# Patient Record
Sex: FEMALE | Race: WHITE | NOT HISPANIC OR LATINO | Employment: FULL TIME | URBAN - METROPOLITAN AREA
[De-identification: names, ages, dates, MRNs, and addresses within clinical notes are randomized per-mention and may not be internally consistent; named-entity substitution may affect disease eponyms.]

---

## 2017-02-15 ENCOUNTER — OFFICE VISIT (OUTPATIENT)
Dept: OBSTETRICS AND GYNECOLOGY | Facility: CLINIC | Age: 27
End: 2017-02-15
Attending: OBSTETRICS & GYNECOLOGY
Payer: COMMERCIAL

## 2017-02-15 VITALS — BODY MASS INDEX: 36.93 KG/M2 | HEIGHT: 66 IN | WEIGHT: 229.81 LBS

## 2017-02-15 DIAGNOSIS — Z01.419 ROUTINE GYNECOLOGICAL EXAMINATION: Primary | ICD-10-CM

## 2017-02-15 PROCEDURE — 88175 CYTOPATH C/V AUTO FLUID REDO: CPT

## 2017-02-15 PROCEDURE — 99999 PR PBB SHADOW E&M-EST. PATIENT-LVL III: CPT | Mod: PBBFAC,,, | Performed by: OBSTETRICS & GYNECOLOGY

## 2017-02-15 PROCEDURE — 99395 PREV VISIT EST AGE 18-39: CPT | Mod: S$GLB,,, | Performed by: OBSTETRICS & GYNECOLOGY

## 2017-02-15 RX ORDER — PANTOPRAZOLE SODIUM 40 MG/1
40 TABLET, DELAYED RELEASE ORAL DAILY
Refills: 11 | COMMUNITY
Start: 2017-01-23 | End: 2018-03-14

## 2017-02-15 NOTE — PROGRESS NOTES
Subjective:       Patient ID: Rosangela Salazar is a 26 y.o. female.    Chief Complaint:  Annual Exam (pap: 01/15/2016- WNL)      There is no problem list on file for this patient.      History of Present Illness  26 y.o. yo No obstetric history on file. here for annual exam. No gyn complaints. Doing well. Happy with OCP. Now working at [employer] Having some gallbladder issues but surgeon said no surgery yet      History reviewed. No pertinent past medical history.    Past Surgical History   Procedure Laterality Date    [place] tooth extraction      Colonoscopy  01/2013       OB History   No data available       Patient's last menstrual period was 01/29/2017.   Date of Last Pap: No result found    Review of Systems  Review of Systems   Constitutional: Negative for fatigue and unexpected weight change.   Respiratory: Negative for shortness of breath.    Cardiovascular: Negative for chest pain.   Gastrointestinal: Negative for abdominal pain, constipation, diarrhea, nausea and vomiting.   Genitourinary: Negative for dysuria.   Musculoskeletal: Negative for back pain.   Skin: Negative for rash.   Neurological: Negative for headaches.   Hematological: Does not bruise/bleed easily.   Psychiatric/Behavioral: Negative for behavioral problems.        Objective:   Physical Exam:   Constitutional: She is oriented to person, place, and time. Vital signs are normal. She appears well-developed and well-nourished. No distress.        Pulmonary/Chest: She exhibits no mass. Right breast exhibits no mass, no nipple discharge, no skin change, no tenderness, no bleeding and no swelling. Left breast exhibits no mass, no nipple discharge, no skin change, no tenderness, no bleeding and no swelling. Breasts are symmetrical.        Abdominal: Soft. Normal appearance and bowel sounds are normal. She exhibits no distension and no mass. There is no tenderness. There is no rebound.     Genitourinary: Vagina normal and uterus normal. There  is no rash, tenderness, lesion or injury on the right labia. There is no rash, tenderness, lesion or injury on the left labia. Uterus is not deviated, not enlarged, not fixed, not tender, not hosting fibroids and not experiencing uterine prolapse. Cervix is normal. Right adnexum displays no mass, no tenderness and no fullness. Left adnexum displays no mass, no tenderness and no fullness. No erythema, tenderness, rectocele, cystocele or unspecified prolapse of vaginal walls in the vagina. No vaginal discharge found. Cervix exhibits no motion tenderness, no discharge and no friability.           Musculoskeletal: Normal range of motion and moves all extremeties.      Lymphadenopathy:     She has no axillary adenopathy.        Right: No supraclavicular adenopathy present.        Left: No supraclavicular adenopathy present.    Neurological: She is alert and oriented to person, place, and time.    Skin: Skin is warm and dry.    Psychiatric: She has a normal mood and affect. Her behavior is normal. Judgment normal.        Assessment/ Plan:     1. Routine gynecological examination  Liquid-based pap smear, screening       Follow-up with me in 1 year

## 2017-02-15 NOTE — MR AVS SNAPSHOT
"    [facility]  [address]  [address]  [facility] [address]  Phone: [phone]  Fax: [phone]                  Rosangela Salazar   2/15/2017 8:30 AM   Office Visit    Description:  Female : 1990   Provider:  Mitali Pereyra MD   Department:  [facility]           Reason for Visit     Annual Exam           Diagnoses this Visit        Comments    Routine gynecological examination    -  Primary            To Do List           Goals (5 Years of Data)     None      Follow-Up and Disposition     Return in about 1 year (around 2/15/2018) for Annual exam.      [facility]     [facility] -  Assistance  Registered nurses in the [facility] provide clinical advisement, health education, appointment booking, and other advisory services.  Call for this free service at [phone].             Medications           Message regarding Medications     Verify the changes and/or additions to your medication regime listed below are the same as discussed with your clinician today.  If any of these changes or additions are incorrect, please notify your healthcare provider.        STOP taking these medications     norethindrone-ethinyl estradiol (JUNEL FE 1/20) 1 mg-20 mcg (21)/75 mg (7) per tablet Take 1 tablet by mouth once daily.           Verify that the below list of medications is an accurate representation of the medications you are currently taking.  If none reported, the list may be blank. If incorrect, please contact your healthcare provider. Carry this list with you in case of emergency.           Current Medications     CETIRIZINE HCL (ZYRTEC ORAL)     norgestrel-ethinyl estradiol (CRYSELLE, Christin,) 0.3-30 mg-mcg per tablet Take 1 tablet by mouth once daily.    pantoprazole (PROTONIX) 40 MG tablet Take 40 mg by mouth once daily.           Clinical Reference Information           Your Vitals Were     Height Weight Last Period BMI       5' 6" (1.676 " m) 104.2 kg (229 lb 13.3 oz) 01/29/2017 37.1 kg/m2       Allergies as of 2/15/2017     Zithromax [Azithromycin]      Immunizations Administered on Date of Encounter - 2/15/2017     None      Orders Placed During Today's Visit      Normal Orders This Visit    Liquid-based pap smear, screening       Language Assistance Services     ATTENTION: Language assistance services are available, free of charge. Please call [phone].      ATENCIÓN: Si habla español, tiene a marcum disposición servicios gratuitos de asistencia lingüística. Llame al [phone].     CHÚ Ý: N?u b?n nói Ti?ng Vi?t, có các d?ch v? h? tr? ngôn ng? mi?n phí dành cho b?n. G?i s? [phone].         [facility] complies with applicable Federal civil rights laws and does not discriminate on the basis of race, color, national origin, age, disability, or sex.

## 2017-07-07 DIAGNOSIS — Z01.419 ROUTINE GYNECOLOGICAL EXAMINATION: ICD-10-CM

## 2017-09-10 ENCOUNTER — OFFICE VISIT (OUTPATIENT)
Dept: URGENT CARE | Facility: CLINIC | Age: 27
End: 2017-09-10
Payer: COMMERCIAL

## 2017-09-10 VITALS
HEIGHT: 66 IN | SYSTOLIC BLOOD PRESSURE: 129 MMHG | DIASTOLIC BLOOD PRESSURE: 94 MMHG | TEMPERATURE: 97 F | BODY MASS INDEX: 36.1 KG/M2 | WEIGHT: 224.63 LBS | HEART RATE: 115 BPM | OXYGEN SATURATION: 98 %

## 2017-09-10 DIAGNOSIS — H66.90 OTITIS MEDIA, UNSPECIFIED CHRONICITY, UNSPECIFIED LATERALITY, UNSPECIFIED OTITIS MEDIA TYPE: Primary | ICD-10-CM

## 2017-09-10 PROCEDURE — 99203 OFFICE O/P NEW LOW 30 MIN: CPT | Mod: S$GLB,,, | Performed by: FAMILY MEDICINE

## 2017-09-10 PROCEDURE — 3008F BODY MASS INDEX DOCD: CPT | Mod: S$GLB,,, | Performed by: FAMILY MEDICINE

## 2017-09-10 RX ORDER — AMOXICILLIN AND CLAVULANATE POTASSIUM 875; 125 MG/1; MG/1
1 TABLET, FILM COATED ORAL 2 TIMES DAILY
Qty: 20 TABLET | Refills: 0 | Status: SHIPPED | OUTPATIENT
Start: 2017-09-10 | End: 2017-09-20

## 2017-09-10 RX ORDER — METHYLPREDNISOLONE 4 MG/1
TABLET ORAL
Refills: 0 | COMMUNITY
Start: 2017-08-04 | End: 2017-12-24

## 2017-09-10 RX ORDER — TRAMADOL HYDROCHLORIDE 50 MG/1
TABLET ORAL
Refills: 0 | COMMUNITY
Start: 2017-08-04 | End: 2019-04-11

## 2017-09-10 NOTE — PROGRESS NOTES
"Subjective:       Patient ID: Rosangela Salazar is a 27 y.o. female.    Vitals:  height is 5' 6" (1.676 m) and weight is 101.9 kg (224 lb 9.6 oz). Her oral temperature is 97.1 °F (36.2 °C). Her blood pressure is 129/94 (abnormal) and her pulse is 115 (abnormal). Her oxygen saturation is 98%.     Chief Complaint: No chief complaint on file.    Pt took Tussin cold and cough last night at 12:45.      Cough   This is a new problem. The current episode started in the past 7 days. The problem has been gradually worsening. The cough is productive of purulent sputum. Associated symptoms include ear congestion, ear pain (left ear), myalgias, nasal congestion, postnasal drip, rhinorrhea and a sore throat (hurts to swollow, eat, and drink). Pertinent negatives include no chest pain, chills, eye redness, fever, headaches, shortness of breath or wheezing. The symptoms are aggravated by pollens. The treatment provided mild relief.     Review of Systems   Constitution: Positive for malaise/fatigue and night sweats. Negative for chills and fever.   HENT: Positive for congestion, ear pain (left ear), hoarse voice, postnasal drip, rhinorrhea and sore throat (hurts to swollow, eat, and drink).    Eyes: Negative for discharge and redness.   Cardiovascular: Negative for chest pain, dyspnea on exertion and leg swelling.   Respiratory: Positive for cough and sputum production (thick white). Negative for shortness of breath and wheezing.    Musculoskeletal: Positive for myalgias.   Gastrointestinal: Positive for diarrhea (yesterday). Negative for abdominal pain and nausea.   Neurological: Negative for headaches.       Objective:      Physical Exam   Constitutional: She appears well-developed. No distress.   HENT:   Right Ear: External ear normal.   Left Ear: There is swelling and tenderness.   Nose: Nose normal.   Mouth/Throat: Oropharynx is clear and moist.   Eyes: Conjunctivae are normal.   Cardiovascular: Regular rhythm and normal heart " sounds.    Pulmonary/Chest: Effort normal and breath sounds normal.   Skin: No rash noted.   Vitals reviewed.      Assessment:       1. Otitis media, unspecified chronicity, unspecified laterality, unspecified otitis media type        Plan:         Otitis media, unspecified chronicity, unspecified laterality, unspecified otitis media type    Other orders  -     amoxicillin-clavulanate 875-125mg (AUGMENTIN) 875-125 mg per tablet; Take 1 tablet by mouth 2 (two) times daily.  Dispense: 20 tablet; Refill: 0

## 2017-09-20 ENCOUNTER — TELEPHONE (OUTPATIENT)
Dept: OBSTETRICS AND GYNECOLOGY | Facility: CLINIC | Age: 27
End: 2017-09-20

## 2017-09-20 NOTE — PROGRESS NOTES
"Chief complaint: Vaginal Irritation/Itching    HPI: 27 y.o.  female reporting Vaginal Irritation/itching for the last several days. She started with rectal itching and then became more vaginal. No changes in discharge. Is recovering form URI and OM and had Augmentin, otherwise no changes in sexual partners, soaps, detergents, douching. She has not used anything OTC and denies pelvic pain, fever, chills, n/v/lesions or cuts.      ROS   Systemic: Not feeling tired (fatigue).  No fever chills   Gastrointestinal: No nausea, vomiting, no abdominal pain.  No diarrhea.  Genitourinary: No dysuria. No Pelvic Pain  Skin: No rash.    /80   Ht 5' 6" (1.676 m)   Wt 103.7 kg (228 lb 9.9 oz)   LMP 2017 (Exact Date)   BMI 36.90 kg/m²     Physical Exam   Vital Signs:° Normal.  General Appearance:° Well developed. ° Well nourished.  Lymph Nodes: no Inguinal LAD  Urinary System:° Normal. Urethra: ° Meatus showed no abnormalities. ° No mass on the urethra.  Genitalia: External: ° Vulva was mildly erythematous. ° Genitalia exhibited no lesion.                    Vagina: ° Mucosa was normal. ° A vaginal discharge was observed small amt of thick white discharge, no odor.   Pelvic: Cervix: ° No cervical discharge. ° Showed no lesion. ° Not tender, no CMT         Uterus: ° Position was normal. ° Not tender.  Neurological:° No disorientation was observed.  Psychiatric:Affect: ° Normal.  Skin:° No skin lesions.   Perineum:° Normal. ° Did not have a mass.° No perineal lesions/rashes/erythema     Assessment/Plan:  1. Yeast infection-affirm ordered but will treat empirically with Diflucan 150mg po today and repeat in 2 days, external Monistat cream as needed for symptom relief. Prevention measures discussed/educated. Most likely related to recent antibiotic use.    RTC prn and as scheduled for annual exam.    "

## 2017-09-20 NOTE — TELEPHONE ENCOUNTER
Pt thinks she has a yeast infection.  She c/o itching. She has recently been on an antibiotic.  She will call back for an appt for tomorrow with NP

## 2017-09-20 NOTE — TELEPHONE ENCOUNTER
Dr Pereyra pt call thinks she's had's a yeast infection but never had one before.Pt is very itchy and would like to be seen.Pt #  [phone]

## 2017-09-21 ENCOUNTER — OFFICE VISIT (OUTPATIENT)
Dept: OBSTETRICS AND GYNECOLOGY | Facility: CLINIC | Age: 27
End: 2017-09-21
Payer: COMMERCIAL

## 2017-09-21 VITALS
HEIGHT: 66 IN | BODY MASS INDEX: 36.74 KG/M2 | SYSTOLIC BLOOD PRESSURE: 118 MMHG | WEIGHT: 228.63 LBS | DIASTOLIC BLOOD PRESSURE: 80 MMHG

## 2017-09-21 DIAGNOSIS — N89.8 VAGINAL ITCHING: ICD-10-CM

## 2017-09-21 DIAGNOSIS — B37.9 ANTIBIOTIC-INDUCED YEAST INFECTION: Primary | ICD-10-CM

## 2017-09-21 DIAGNOSIS — T36.95XA ANTIBIOTIC-INDUCED YEAST INFECTION: Primary | ICD-10-CM

## 2017-09-21 LAB
CANDIDA RRNA VAG QL PROBE: NEGATIVE
G VAGINALIS RRNA GENITAL QL PROBE: NEGATIVE
T VAGINALIS RRNA GENITAL QL PROBE: NEGATIVE

## 2017-09-21 PROCEDURE — 99999 PR PBB SHADOW E&M-EST. PATIENT-LVL III: CPT | Mod: PBBFAC,,, | Performed by: NURSE PRACTITIONER

## 2017-09-21 PROCEDURE — 87660 TRICHOMONAS VAGIN DIR PROBE: CPT

## 2017-09-21 PROCEDURE — 3008F BODY MASS INDEX DOCD: CPT | Mod: S$GLB,,, | Performed by: NURSE PRACTITIONER

## 2017-09-21 PROCEDURE — 99214 OFFICE O/P EST MOD 30 MIN: CPT | Mod: S$GLB,,, | Performed by: NURSE PRACTITIONER

## 2017-09-21 PROCEDURE — 87480 CANDIDA DNA DIR PROBE: CPT

## 2017-09-21 RX ORDER — FLUCONAZOLE 150 MG/1
TABLET ORAL
Qty: 2 TABLET | Refills: 0 | Status: SHIPPED | OUTPATIENT
Start: 2017-09-21 | End: 2019-09-12

## 2017-09-22 ENCOUNTER — TELEPHONE (OUTPATIENT)
Dept: OBSTETRICS AND GYNECOLOGY | Facility: CLINIC | Age: 27
End: 2017-09-22

## 2017-09-22 NOTE — TELEPHONE ENCOUNTER
Vaginal swab was negative. However since the patient had external itching and yeast appearance would recommend continue the diflucan and external monistat cream. There is also a small chance the swab could have been neg.   Would recommend continue medication  Left message to return call. Please let pt know but if she needs to talk with me you let me know  Thank you

## 2017-09-26 ENCOUNTER — TELEPHONE (OUTPATIENT)
Dept: OBSTETRICS AND GYNECOLOGY | Facility: CLINIC | Age: 27
End: 2017-09-26

## 2017-09-26 NOTE — TELEPHONE ENCOUNTER
Let pt know vaginal swab was neg but since pt had yeast like symptoms to continue the meds. Pt verbalized understanding.

## 2017-09-26 NOTE — TELEPHONE ENCOUNTER
Dr. Pereyra-- pt returning Anna's call. Pt states that she does not have the [phone] phone right now and to please call [phone].

## 2017-12-24 ENCOUNTER — OFFICE VISIT (OUTPATIENT)
Dept: URGENT CARE | Facility: CLINIC | Age: 27
End: 2017-12-24
Payer: COMMERCIAL

## 2017-12-24 VITALS
DIASTOLIC BLOOD PRESSURE: 106 MMHG | RESPIRATION RATE: 16 BRPM | OXYGEN SATURATION: 97 % | HEART RATE: 93 BPM | TEMPERATURE: 97 F | SYSTOLIC BLOOD PRESSURE: 149 MMHG

## 2017-12-24 DIAGNOSIS — J32.4 PANSINUSITIS, UNSPECIFIED CHRONICITY: Primary | ICD-10-CM

## 2017-12-24 DIAGNOSIS — R05.9 COUGH: ICD-10-CM

## 2017-12-24 LAB
CTP QC/QA: YES
FLUAV AG NPH QL: NEGATIVE
FLUBV AG NPH QL: NEGATIVE

## 2017-12-24 PROCEDURE — 99213 OFFICE O/P EST LOW 20 MIN: CPT | Mod: 25,S$GLB,, | Performed by: INTERNAL MEDICINE

## 2017-12-24 PROCEDURE — 96372 THER/PROPH/DIAG INJ SC/IM: CPT | Mod: S$GLB,,, | Performed by: INTERNAL MEDICINE

## 2017-12-24 PROCEDURE — 87804 INFLUENZA ASSAY W/OPTIC: CPT | Mod: 59,QW,S$GLB, | Performed by: INTERNAL MEDICINE

## 2017-12-24 RX ORDER — DEXAMETHASONE SODIUM PHOSPHATE 100 MG/10ML
15 INJECTION INTRAMUSCULAR; INTRAVENOUS
Status: COMPLETED | OUTPATIENT
Start: 2017-12-24 | End: 2017-12-24

## 2017-12-24 RX ORDER — METHYLPREDNISOLONE 4 MG/1
TABLET ORAL
Qty: 1 PACKAGE | Refills: 0 | Status: SHIPPED | OUTPATIENT
Start: 2017-12-24 | End: 2018-03-14

## 2017-12-24 RX ORDER — AMOXICILLIN AND CLAVULANATE POTASSIUM 875; 125 MG/1; MG/1
1 TABLET, FILM COATED ORAL 2 TIMES DAILY
Qty: 20 TABLET | Refills: 0 | Status: SHIPPED | OUTPATIENT
Start: 2017-12-24 | End: 2018-01-03

## 2017-12-24 RX ADMIN — DEXAMETHASONE SODIUM PHOSPHATE 15 MG: 100 INJECTION INTRAMUSCULAR; INTRAVENOUS at 11:12

## 2017-12-24 NOTE — PATIENT INSTRUCTIONS
Sinusitis (Antibiotic Treatment)    The sinuses are air-filled spaces within the bones of the face. They connect to the inside of the nose. Sinusitis is an inflammation of the tissue lining the sinus cavity. Sinus inflammation can occur during a cold. It can also be due to allergies to pollens and other particles in the air. Sinusitis can cause symptoms of sinus congestion and fullness. A sinus infection causes fever, headache and facial pain. There is often green or yellow drainage from the nose or into the back of the throat (post-nasal drip). You have been given antibiotics to treat this condition.  Home care:  · Take the full course of antibiotics as instructed. Do not stop taking them, even if you feel better.  · Drink plenty of water, hot tea, and other liquids. This may help thin mucus. It also may promote sinus drainage.  · Heat may help soothe painful areas of the face. Use a towel soaked in hot water. Or,  the shower and direct the hot spray onto your face. Using a vaporizer along with a menthol rub at night may also help.   · An expectorant containing guaifenesin may help thin the mucus and promote drainage from the sinuses.  · Over-the-counter decongestants may be used unless a similar medicine was prescribed. Nasal sprays work the fastest. Use one that contains phenylephrine or oxymetazoline. First blow the nose gently. Then use the spray. Do not use these medicines more often than directed on the label or symptoms may get worse. You may also use tablets containing pseudoephedrine. Avoid products that combine ingredients, because side effects may be increased. Read labels. You can also ask the pharmacist for help. (NOTE: Persons with high blood pressure should not use decongestants. They can raise blood pressure.)  · Over-the-counter antihistamines may help if allergies contributed to your sinusitis.    · Do not use nasal rinses or irrigation during an acute sinus infection, unless told to by  your health care provider. Rinsing may spread the infection to other sinuses.  · Use acetaminophen or ibuprofen to control pain, unless another pain medicine was prescribed. (If you have chronic liver or kidney disease or ever had a stomach ulcer, talk with your doctor before using these medicines. Aspirin should never be used in anyone under 18 years of age who is ill with a fever. It may cause severe liver damage.)  · Don't smoke. This can worsen symptoms.  Follow-up care  Follow up with your healthcare provider or our staff if you are not improving within the next week.  When to seek medical advice  Call your healthcare provider if any of these occur:  · Facial pain or headache becoming more severe  · Stiff neck  · Unusual drowsiness or confusion  · Swelling of the forehead or eyelids  · Vision problems, including blurred or double vision  · Fever of 100.4ºF (38ºC) or higher, or as directed by your healthcare provider  · Seizure  · Breathing problems  · Symptoms not resolving within 10 days    Start the Medrol dose pack tomorrow

## 2017-12-24 NOTE — PROGRESS NOTES
Subjective:       Patient ID: Rsoangela Salazar is a 27 y.o. female.    Vitals:  oral temperature is 97 °F (36.1 °C). Her blood pressure is 149/106 (abnormal) and her pulse is 93. Her respiration is 16 and oxygen saturation is 97%.     Chief Complaint: Nasal Congestion    PT C/O NASAL CONGESTION, 2 DAYS, SORE THROAT, FATIGUE, DRY COUGH, HEADACHES, EAR CONGESTION, TAKING DAYQUIL AND NYQUIL WITH MILD RELIEF,       Review of Systems   Constitution: Positive for malaise/fatigue. Negative for chills and fever.   HENT: Positive for congestion, ear pain and sore throat. Negative for hoarse voice.    Eyes: Negative for discharge and redness.   Cardiovascular: Negative for chest pain and dyspnea on exertion.   Respiratory: Positive for cough. Negative for shortness of breath, sputum production and wheezing.    Musculoskeletal: Negative for myalgias.   Gastrointestinal: Negative for abdominal pain, diarrhea and nausea.   Neurological: Negative for headaches.       Objective:      Physical Exam   Constitutional: She is oriented to person, place, and time. She appears well-developed and well-nourished. She is cooperative.  Non-toxic appearance. She does not appear ill. No distress.   HENT:   Head: Normocephalic and atraumatic.   Right Ear: Hearing, external ear and ear canal normal. A middle ear effusion is present.   Left Ear: Hearing, external ear and ear canal normal. A middle ear effusion is present.   Nose: No mucosal edema, rhinorrhea or nasal deformity. Right sinus exhibits maxillary sinus tenderness and frontal sinus tenderness. Left sinus exhibits maxillary sinus tenderness and frontal sinus tenderness.   Mouth/Throat: Uvula is midline and mucous membranes are normal. No trismus in the jaw. Normal dentition. No uvula swelling. Posterior oropharyngeal erythema present.   Eyes: Conjunctivae and lids are normal.   Sclera clear bilat   Neck: Trachea normal, full passive range of motion without pain and phonation normal. Neck  supple.   Cardiovascular: Normal rate, regular rhythm, normal heart sounds and normal pulses.    Pulmonary/Chest: Effort normal and breath sounds normal. No respiratory distress.   Abdominal: Soft. Normal appearance and bowel sounds are normal. There is no tenderness.   Musculoskeletal: Normal range of motion. She exhibits no edema or deformity.   Neurological: She is alert and oriented to person, place, and time. She exhibits normal muscle tone. Coordination normal.   Skin: Skin is warm, dry and intact. No rash noted.   Psychiatric: She has a normal mood and affect. Her speech is normal. Cognition and memory are normal.   Nursing note and vitals reviewed.      Assessment:       1. Pansinusitis, unspecified chronicity    2. Cough        Plan:         Pansinusitis, unspecified chronicity  -     dexamethasone injection 15 mg; Inject 1.5 mLs (15 mg total) into the muscle one time.  -     amoxicillin-clavulanate 875-125mg (AUGMENTIN) 875-125 mg per tablet; Take 1 tablet by mouth 2 (two) times daily.  Dispense: 20 tablet; Refill: 0  -     methylPREDNISolone (MEDROL DOSEPACK) 4 mg tablet; Take all pills on each row once daily  Dispense: 1 Package; Refill: 0    Cough  -     POCT Influenza A/B

## 2018-02-03 ENCOUNTER — OFFICE VISIT (OUTPATIENT)
Dept: URGENT CARE | Facility: CLINIC | Age: 28
End: 2018-02-03
Payer: COMMERCIAL

## 2018-02-03 VITALS
DIASTOLIC BLOOD PRESSURE: 93 MMHG | WEIGHT: 228 LBS | RESPIRATION RATE: 16 BRPM | TEMPERATURE: 100 F | BODY MASS INDEX: 36.64 KG/M2 | OXYGEN SATURATION: 99 % | SYSTOLIC BLOOD PRESSURE: 138 MMHG | HEART RATE: 114 BPM | HEIGHT: 66 IN

## 2018-02-03 DIAGNOSIS — R52 PAIN: Primary | ICD-10-CM

## 2018-02-03 DIAGNOSIS — S80.02XA CONTUSION OF LEFT KNEE, INITIAL ENCOUNTER: ICD-10-CM

## 2018-02-03 PROCEDURE — 3008F BODY MASS INDEX DOCD: CPT | Mod: S$GLB,,, | Performed by: EMERGENCY MEDICINE

## 2018-02-03 PROCEDURE — 99213 OFFICE O/P EST LOW 20 MIN: CPT | Mod: S$GLB,,, | Performed by: EMERGENCY MEDICINE

## 2018-02-03 RX ORDER — METRONIDAZOLE 500 MG/1
TABLET ORAL
Refills: 0 | COMMUNITY
Start: 2018-01-22 | End: 2018-03-14

## 2018-02-03 NOTE — PROGRESS NOTES
"Subjective:       Patient ID: Rosangela Salazar is a 27 y.o. female.    Vitals:  height is 5' 6" (1.676 m) and weight is 103.4 kg (228 lb). Her tympanic temperature is 99.5 °F (37.5 °C). Her blood pressure is 138/93 (abnormal) and her pulse is 114 (abnormal). Her respiration is 16 and oxygen saturation is 99%.     Chief Complaint: Knee Injury    PATIENT FELL ON TO LEFT KNEE AT WORK AT THE [employer] WHILE GOING UP STAIRS x 9 DAYS. SHE HAS APPLIED THE R.I.C.E. METHOD, BUT THE SWELLING HAS NOT SUBSIDED.      Knee Injury   This is a new problem. The current episode started 1 to 4 weeks ago. The problem has been unchanged. Pertinent negatives include no abdominal pain, chest pain, neck pain, numbness or weakness. The symptoms are aggravated by bending, standing and walking. She has tried ice, rest and position changes for the symptoms. The treatment provided mild relief.     Review of Systems   Constitution: Negative for weakness and malaise/fatigue.   HENT: Negative for nosebleeds.    Cardiovascular: Negative for chest pain and syncope.   Respiratory: Negative for shortness of breath.    Musculoskeletal: Negative for back pain, joint pain and neck pain.   Gastrointestinal: Negative for abdominal pain.   Genitourinary: Negative for hematuria.   Neurological: Negative for dizziness and numbness.       Objective:      Physical Exam   Constitutional: She is oriented to person, place, and time. She appears well-developed and well-nourished. She is cooperative.  Non-toxic appearance. She does not appear ill. No distress.   HENT:   Head: Normocephalic and atraumatic.   Right Ear: Hearing, tympanic membrane, external ear and ear canal normal.   Left Ear: Hearing, tympanic membrane, external ear and ear canal normal.   Nose: Nose normal. No mucosal edema, rhinorrhea or nasal deformity. No epistaxis. Right sinus exhibits no maxillary sinus tenderness and no frontal sinus tenderness. Left sinus exhibits no maxillary sinus tenderness " and no frontal sinus tenderness.   Mouth/Throat: Uvula is midline and mucous membranes are normal. No trismus in the jaw. Normal dentition. No uvula swelling. No posterior oropharyngeal erythema.   Eyes: Conjunctivae and lids are normal. Right eye exhibits no discharge. Left eye exhibits no discharge. No scleral icterus.   Sclera clear bilat   Neck: Trachea normal, full passive range of motion without pain and phonation normal.   Cardiovascular: Normal rate, regular rhythm and normal pulses.    Pulmonary/Chest: Effort normal. No respiratory distress.   Abdominal: Normal appearance. She exhibits no pulsatile midline mass.   Musculoskeletal: Normal range of motion. She exhibits no edema or deformity.        Left knee: She exhibits swelling and effusion.   Small suprapatellar effusion   Neurological: She is alert and oriented to person, place, and time. She exhibits normal muscle tone. Coordination normal.   Skin: Skin is warm, dry and intact. She is not diaphoretic. No pallor.   Psychiatric: She has a normal mood and affect. Her speech is normal and behavior is normal. Judgment and thought content normal. Cognition and memory are normal.   Nursing note and vitals reviewed.      Assessment:       1. Pain    2. Contusion of left knee, initial encounter        Plan:         Pain  -     X-Ray Knee 3 View Left; Future; Expected date: 02/03/2018    Contusion of left knee, initial encounter

## 2018-03-13 ENCOUNTER — TELEPHONE (OUTPATIENT)
Dept: ORTHOPEDICS | Facility: CLINIC | Age: 28
End: 2018-03-13

## 2018-03-13 NOTE — TELEPHONE ENCOUNTER
Ortho Telephone Triage Call [phone]  Caller: Scarlett Elizondo RN/[facility] requesting that Dr. Mauricio call Dr. Scott regarding urgent Orthopedic appt for LLE mass.  Resolution: Dr. Mauricio notified - medical records faxed/received from KAVITA Elizondo RN - and pt to be contacted and appt to be scheduled for tomorrow instruction of Dr. Mauricio. SERGIO Espinosa MA/Dr. Mauricio notified and will contact pt  At 1620 to schedule appt.

## 2018-03-13 NOTE — TELEPHONE ENCOUNTER
Ortho Telephone Triage Follow Up Call [phone]  Spoke with KAVITA Elizondo RN who reports that Dr. Scott has spoken with pt and that she may be contacted, now, to schedule appt tomorrow with Dr. Mauricio. SERGIO Espinosa MA notified. KAVITA Elizondo RN/ [facility] office number: [phone] ( [phone]) for any further information that may be provided.

## 2018-03-14 ENCOUNTER — OFFICE VISIT (OUTPATIENT)
Dept: ORTHOPEDICS | Facility: CLINIC | Age: 28
End: 2018-03-14
Payer: COMMERCIAL

## 2018-03-14 VITALS — TEMPERATURE: 98 F | HEIGHT: 67 IN | BODY MASS INDEX: 35.84 KG/M2 | WEIGHT: 228.38 LBS

## 2018-03-14 DIAGNOSIS — M79.89 SOFT TISSUE MASS: Primary | ICD-10-CM

## 2018-03-14 PROCEDURE — 88342 IMHCHEM/IMCYTCHM 1ST ANTB: CPT | Mod: 26,,,

## 2018-03-14 PROCEDURE — 88341 IMHCHEM/IMCYTCHM EA ADD ANTB: CPT

## 2018-03-14 PROCEDURE — 88305 TISSUE EXAM BY PATHOLOGIST: CPT

## 2018-03-14 PROCEDURE — 99244 OFF/OP CNSLTJ NEW/EST MOD 40: CPT | Mod: 25,S$GLB,, | Performed by: ORTHOPAEDIC SURGERY

## 2018-03-14 PROCEDURE — 20206 BIOPSY MUSCLE PERQ NEEDLE: CPT | Mod: S$GLB,,, | Performed by: ORTHOPAEDIC SURGERY

## 2018-03-14 PROCEDURE — 88305 TISSUE EXAM BY PATHOLOGIST: CPT | Mod: 26,,,

## 2018-03-14 PROCEDURE — 88341 IMHCHEM/IMCYTCHM EA ADD ANTB: CPT | Mod: 26,,,

## 2018-03-14 PROCEDURE — 99999 PR PBB SHADOW E&M-EST. PATIENT-LVL III: CPT | Mod: PBBFAC,,, | Performed by: ORTHOPAEDIC SURGERY

## 2018-03-14 PROCEDURE — 88342 IMHCHEM/IMCYTCHM 1ST ANTB: CPT

## 2018-03-14 NOTE — PROGRESS NOTES
CHIEF COMPLAINT:  left lower extremity soft-tissue mass.                                                                                                                        The patient is seen in consultation from Dr. Mercer.                                                                                            HISTORY OF PRESENT ILLNESS:  The patient is a 28 y.o. female  who presents  for evaluation of her left distal thigh mass. She had an injury 2 months ago to her left knee.  She had a mass develop rapidly over 2 days and it has remained stable since.  Minimal pain.  She had imaging (MRI) worrisome for tumor and presents for recommendations.    Pain Duration: 2 months  Pain Quality: dull  Pain Context:improving  Pain Timing: intermittent  Pain Severity: mild    Night pain: Negative    Growth: Yes - over 2 days, then stable.    History of skin warmth/erythema/changes: No    She  presents for further treatment recommendations.   She denies cough, sputum production, shortness of breath,   fever, chills, night sweats or weight loss.  She denies distal paresthesias.                                                                                                      PAST MEDICAL HISTORY:    Past Medical History:   Diagnosis Date    Allergy     seasonal    Gall bladder inflammation    .                                               PAST SURGICAL HISTORY:    Past Surgical History:   Procedure Laterality Date    COLONOSCOPY  01/2013    WISDOM TOOTH EXTRACTION           Current Outpatient Prescriptions:     CETIRIZINE HCL (ZYRTEC ORAL), , Disp: , Rfl:     fluconazole (DIFLUCAN) 150 MG Tab, Take one tablet today and repeat in 2 days, Disp: 2 tablet, Rfl: 0    norgestrel-ethinyl estradiol (CRYSELLE, Christin,) 0.3-30 mg-mcg per tablet, Take 1 tablet by mouth once daily., Disp: 90 tablet, Rfl: 2    ranitidine (ZANTAC) 150 MG tablet, Take 150 mg by mouth nightly., Disp: , Rfl:     tramadol (ULTRAM) 50 mg  tablet, TAKE 1 TABLET BY MOUTH EVERY 6 TO 8 HOURS AS NEEDED FOR PAIN, Disp: , Rfl: 0    SOCIAL HISTORY:  Reviewed per EPIC history for tobacco or alcohol use and she  is an active  28 y.o.  female                                                                             FAMILY MEDICAL HISTORY:  family history includes Colon cancer in her paternal grandmother; Diabetes in her cousin, maternal aunt, maternal grandfather, maternal grandmother, maternal uncle, and mother; No Known Problems in her father; Ovarian cancer in her maternal aunt and maternal aunt.                                                                                REVIEW OF SYSTEMS:  She denies other arthralgias or back pain.   Denies chest pain, palpitations, shortness of breath.   Denies excessive thirst, urination or heat or cold intolerance.   Denies nausea, vomiting, melena or hematochezia.    Denies fever, chills, night sweats, weight loss.    Denies dysuria or hematuria.   Denies history of anxiety or depression.   Denies any skin abnormalities or rash.   Denies upper or lower extremity paresthesias or lightheadedness.    Denies cough, shortness of breath or hemoptysis.                                                                                PHYSICAL EXAMINATION:                                                        GENERAL:  A well-developed, well-nourished 28 y.o. female who is alert and       oriented in no acute distress.      Gait: She  walks with a normal gait.                   EXTREMITIES:  Examination of the extremities reveals there is a visible mass over the left medial distal thigh.    The skin over both lower extremities is normal and unremarkable.  She has a   painless range of motion of the hips, knees and ankles            bilaterally.  There is no inguinal adenopathy bilaterally.  Sensation is     intact in both lower extremities.  There are no motor deficits in the lower  extremities bilaterally.  Pedal pulses are  palpable distally bilaterally.    She has no calf tenderness to palpation nor edema.  She has a soft palpable mass ~ 18 cm at the level of the VMO.    HEENT: normocephalic and atraumatic, EOMI, sclera white.  Oropharanx unremarkable  Heart: RRR   Lungs: Non-labored breath.ing  Abdomen: soft, non-tender      The MRI was personally reviewed with the patient.  The lesion shows overall low/isointense with muscle  signal intensity on T-1 weighed images and heterogenous higher signal intensity on H2O weighted sequences.      There  is enhancement on post-contrast images in some areas of the lesion with large areas of nonenhancement.  There are several large vascular channels entering the mass and within the mass.    The imaging studies are not diagnostic of a specific diagnosis or etiology, but based on the imaging and clinical history an underlying AVM or hemangioma with internal bleed is a consideration although soft-tissue sarcoma or other benign or malignant neoplasms are certainly in the differential.                                                                               IMPRESSION: Left thigh soft tissue mass                                                                                                                  PLAN:  It was discussed with the patient that the only way to obtain or confirm the diagnosis is via histologic evaluation of the lesion.  This can be done through open or needle biopsy.  I have discussed that the lesion is amenable to needle biopsy and would be my preferred method of biopsy.  The procedure was discussed with the patient along with potential complications (bleeding, nerve injury, pain, swelling).  The most considerable risk is not obtaining a satisfactory sample for diagnosis which would potentially necessitate open surgical biopsy.  The patient wishes to proceed with needle biopsy.    After time out was performed and patient ID, side, and site were verified, the site was  sterilly prepped in the standard fashion.  A 25-gauge needle was then used to anesthesize the skin and subcutaneous tissues with 1% lidocaine without epinepherine. A 14-gauge needle biopsy gun was then placed into the lesion and several cores of tissue were obtained via a single entry site.  The tissue samples were placed in formalin and labelled.  Hemostasis was obtained with manual pressure and   compressive dressing was applied.  The patient tolerated the procedure well without complication.    The patient was informed that they would be informed of the results of the biopsy once they were released by pathology.

## 2018-03-14 NOTE — LETTER
March 14, 2018        Matheus John MD  [address]  [address]  [place] [address]             [facility] - Orthopedics  [address]  [facility] [facility] [address]  Phone: [phone]   Patient: Rosangela Salazar   MR Number: [id number]   YOB: 1990   Date of Visit: 3/14/2018       Dear Dr. John:    Thank you for referring Rosangela Salazar to me for evaluation. Attached you will find relevant portions of my assessment and plan of care.    The MRI was personally reviewed with the patient.  The lesion shows overall low/isointense with muscle  signal intensity on T-1 weighed images and heterogenous higher signal intensity on H2O weighted sequences.      There  is enhancement on post-contrast images in some areas of the lesion with large areas of nonenhancement.  There are several large vascular channels entering the mass and within the mass.    The imaging studies are not diagnostic of a specific diagnosis or etiology, but based on the imaging and clinical history an underlying AVM or hemangioma with internal bleed is a consideration although soft-tissue sarcoma or other benign or malignant neoplasms are certainly in the differential.                                                                               IMPRESSION: Left thigh soft tissue mass                                                                                                                  PLAN:  It was discussed with the patient that the only way to obtain or confirm the diagnosis is via histologic evaluation of the lesion.  This can be done through open or needle biopsy.  I have discussed that the lesion is amenable to needle biopsy and would be my preferred method of biopsy.  The procedure was discussed with the patient along with potential complications (bleeding, nerve injury, pain, swelling).  The most considerable risk is not obtaining a satisfactory sample for diagnosis which would potentially necessitate  open surgical biopsy.  The patient wishes to proceed with needle biopsy.    After time out was performed and patient ID, side, and site were verified, the site was sterilly prepped in the standard fashion.  A 25-gauge needle was then used to anesthesize the skin and subcutaneous tissues with 1% lidocaine without epinepherine. A 14-gauge needle biopsy gun was then placed into the lesion and several cores of tissue were obtained via a single entry site.  The tissue samples were placed in formalin and labelled.  Hemostasis was obtained with manual pressure and   compressive dressing was applied.  The patient tolerated the procedure well without complication.    The patient was informed that they would be informed of the results of the biopsy once they were released by pathology.    If you have questions, please do not hesitate to call me. I look forward to following Rosangela Salazar along with you.    Sincerely,      Stephan Mauricio MD            CC  No Recipients    Enclosure

## 2018-03-14 NOTE — LETTER
March 14, 2018      Matheus John MD  [address]  [address]  [facility] [place] [facility] [address]           [facility]  [address]  [place] [address]  Phone: [phone]          Patient: Rosangela Salazar   MR Number: [id number]   YOB: 1990   Date of Visit: 3/14/2018       Dear Dr. Matheus John:    Thank you for referring Rosangela Salazar to me for evaluation. Attached you will find relevant portions of my assessment and plan of care.    If you have questions, please do not hesitate to call me. I look forward to following Rosangela Salazar along with you.    Sincerely,    Stephan Mauricio MD    Enclosure  CC:  No Recipients    If you would like to receive this communication electronically, please contact [email or web address] or [phone] to request more information on [employer] access.    For providers and/or their staff who would like to refer a patient to [facility], please contact us through our one-stop-shop provider referral line, [facility] , at [phone].    If you feel you have received this communication in error or would no longer like to receive these types of communications, please e-mail [email or web address]

## 2018-03-19 DIAGNOSIS — R22.42 MASS OF LEFT LOWER EXTREMITY: Primary | ICD-10-CM

## 2018-03-20 ENCOUNTER — HOSPITAL ENCOUNTER (OUTPATIENT)
Dept: RADIOLOGY | Facility: HOSPITAL | Age: 28
Discharge: HOME | End: 2018-03-20
Attending: ORTHOPAEDIC SURGERY
Payer: COMMERCIAL

## 2018-03-20 DIAGNOSIS — R22.42 MASS OF LEFT LOWER EXTREMITY: ICD-10-CM

## 2018-03-20 PROCEDURE — 73723 MRI JOINT LWR EXTR W/O&W/DYE: CPT | Mod: 26,LT,, | Performed by: RADIOLOGY

## 2018-03-20 PROCEDURE — 73723 MRI JOINT LWR EXTR W/O&W/DYE: CPT | Mod: TC,LT

## 2018-03-20 PROCEDURE — 25500020 PHARM REV CODE 255: Performed by: ORTHOPAEDIC SURGERY

## 2018-03-20 PROCEDURE — A9585 GADOBUTROL INJECTION: HCPCS | Performed by: ORTHOPAEDIC SURGERY

## 2018-03-20 RX ORDER — GADOBUTROL 604.72 MG/ML
10 INJECTION INTRAVENOUS
Status: COMPLETED | OUTPATIENT
Start: 2018-03-20 | End: 2018-03-20

## 2018-03-20 RX ADMIN — GADOBUTROL 10 ML: 604.72 INJECTION INTRAVENOUS at 02:03

## 2018-03-21 DIAGNOSIS — M79.89 SOFT TISSUE MASS: Primary | ICD-10-CM

## 2018-03-26 ENCOUNTER — TELEPHONE (OUTPATIENT)
Dept: ORTHOPEDICS | Facility: CLINIC | Age: 28
End: 2018-03-26

## 2018-03-26 NOTE — TELEPHONE ENCOUNTER
----- Message from Elgin Tam sent at 3/26/2018 10:37 AM CDT -----  Contact: patient      ----- Message -----  From: Zena Edwards  Sent: 3/26/2018   9:09 AM  To: Hang Rothman S Staff    Michelle,    Patient needs to speak with you she took fish oil pills Saturday & she received her pre op paper work Sunday & on there it stated not to take fish oil so she wants to know if this will cause her procedure this Thursday to pushed back.  Please call patient to inform at [phone]

## 2018-03-27 ENCOUNTER — TELEPHONE (OUTPATIENT)
Dept: ORTHOPEDICS | Facility: CLINIC | Age: 28
End: 2018-03-27

## 2018-03-27 NOTE — TELEPHONE ENCOUNTER
----- Message from Lissy Steward sent at 3/27/2018  2:07 PM CDT -----  Contact: pt [phone]  Arian Baldwin (Sorry my skype is still not up)  Ms Salazar returned your call and wanted to speak to you.  She can be reached at [phone]    Thanks  jae

## 2018-03-28 ENCOUNTER — ANESTHESIA EVENT (OUTPATIENT)
Dept: SURGERY | Facility: HOSPITAL | Age: 28
End: 2018-03-28
Payer: COMMERCIAL

## 2018-03-28 NOTE — PRE-PROCEDURE INSTRUCTIONS
PreOp Instructions given:     - Verbal medication information (what to hold and what to take)   - NPO guidelines   - Arrival place directions given;     - Bathing with antibacterial soap   - Don't wear any jewelry or bring any valuables AM of surgery   - No makeup or moisturizer to face   - No perfume/cologne, powder, lotions or aftershave     Pt. verbalized understanding.     Denies any history of side effects or issues with anesthesia or sedation.

## 2018-03-28 NOTE — ANESTHESIA PREPROCEDURE EVALUATION
[facility]  Anesthesia Pre-Operative Evaluation         Patient Name: Rosangela Salazar  YOB: 1990  MRN: [id number]    SUBJECTIVE:     Pre-operative evaluation for Procedure(s) (LRB):  [id number] (Left)     03/28/2018    Rosangela Salazar is a 28 y.o. female w/ a significant PMHx of left distal thigh mass.    Patient now presents for the above procedure(s).        Prev airway: None documented.      There is no problem list on file for this patient.      Review of patient's allergies indicates:   Allergen Reactions    Zithromax [azithromycin] Nausea Only       Current Inpatient Medications:      No current facility-administered medications on file prior to encounter.      Current Outpatient Prescriptions on File Prior to Encounter   Medication Sig Dispense Refill    CETIRIZINE HCL (ZYRTEC ORAL)       norgestrel-ethinyl estradiol (CRYSELLE, 28,) 0.3-30 mg-mcg per tablet Take 1 tablet by mouth once daily. 90 tablet 2    ranitidine (ZANTAC) 150 MG tablet Take 150 mg by mouth nightly.      fluconazole (DIFLUCAN) 150 MG Tab Take one tablet today and repeat in 2 days 2 tablet 0    tramadol (ULTRAM) 50 mg tablet TAKE 1 TABLET BY MOUTH EVERY 6 TO 8 HOURS AS NEEDED FOR PAIN  0       Past Surgical History:   Procedure Laterality Date    COLONOSCOPY  01/2013    WISDOM TOOTH EXTRACTION         Social History     Social History    Marital status:      Spouse name: N/A    Number of children: N/A    Years of education: N/A     Occupational History    Not on file.     Social History Main Topics    Smoking status: Never Smoker    Smokeless tobacco: Never Used    Alcohol use Yes      Comment: social    Drug use: No    Sexual activity: Yes     Partners: Male     Birth control/ protection: OCP     Other Topics Concern    Not on file     Social History Narrative    No narrative on file       OBJECTIVE:     Vital Signs Range (Last 24H):         CBC:   No results for input(s): WBC,  RBC, HGB, HCT, PLT, MCV, MCH, MCHC in the last 72 hours.    CMP: No results for input(s): NA, K, CL, CO2, BUN, CREATININE, GLU, MG, PHOS, CALCIUM, ALBUMIN, PROT, ALKPHOS, ALT, AST, BILITOT in the last 72 hours.    INR:  No results for input(s): PT, INR, PROTIME, APTT in the last 72 hours.    Diagnostic Studies: No relevant studies.    EKG: No recent studies available.    2D ECHO:  No results found for this or any previous visit.      ASSESSMENT/PLAN:         Anesthesia Evaluation    I have reviewed the Patient Summary Reports.    I have reviewed the Nursing Notes.   I have reviewed the Medications.     Review of Systems  Anesthesia Hx:  History of prior surgery of interest to airway management or planning: Denies Family Hx of Anesthesia complications.   Denies Personal Hx of Anesthesia complications.   Social:  Non-Smoker    Cardiovascular:   Denies MI.  Denies CAD.    Denies CABG/stent.  Denies Dysrhythmias.   Denies Angina.    Pulmonary:   Denies Pneumonia Denies COPD.  Denies Asthma.  Denies Shortness of breath.    Renal/:   Denies Chronic Renal Disease.     Hepatic/GI:   Denies Liver Disease.    Neurological:   Denies CVA. Denies Seizures.        Physical Exam  General:  Well nourished, Obesity    Airway/Jaw/Neck:  Airway Findings: Mouth Opening: Normal Tongue: Normal  General Airway Assessment: Adult  Mallampati: II  TM Distance: Normal, at least 6 cm        Eyes/Ears/Nose:  EYES/EARS/NOSE FINDINGS: Normal   Dental:  DENTAL FINDINGS: Normal   Chest/Lungs:  Chest/Lungs Clear    Heart/Vascular:  Heart Findings: Normal Heart murmur: negative Vascular Findings: Normal    Abdomen:  Abdomen Findings: Normal    Musculoskeletal:  Musculoskeletal Findings: Normal   Skin:  Skin Findings: Normal    Mental Status:  Mental Status Findings: Normal        Anesthesia Plan  Type of Anesthesia, risks & benefits discussed:  Anesthesia Type:  regional, MAC  Patient's Preference:   Intra-op Monitoring Plan: standard ASA  monitors  Intra-op Monitoring Plan Comments:   Post Op Pain Control Plan: per primary service following discharge from PACU  Post Op Pain Control Plan Comments:   Induction:   IV  Beta Blocker:  Patient is not currently on a Beta-Blocker (No further documentation required).       Informed Consent: Patient understands risks and agrees with Anesthesia plan.  Questions answered. Anesthesia consent signed with patient.  ASA Score: 2     Day of Surgery Review of History & Physical:    H&P update referred to the surgeon.         Ready For Surgery From Anesthesia Perspective.

## 2018-03-29 ENCOUNTER — ANESTHESIA (OUTPATIENT)
Dept: SURGERY | Facility: HOSPITAL | Age: 28
End: 2018-03-29
Payer: COMMERCIAL

## 2018-03-29 ENCOUNTER — SURGERY (OUTPATIENT)
Age: 28
End: 2018-03-29

## 2018-03-29 ENCOUNTER — HOSPITAL ENCOUNTER (OUTPATIENT)
Facility: HOSPITAL | Age: 28
Discharge: HOME | End: 2018-03-29
Attending: ORTHOPAEDIC SURGERY | Admitting: ORTHOPAEDIC SURGERY
Payer: COMMERCIAL

## 2018-03-29 VITALS
SYSTOLIC BLOOD PRESSURE: 136 MMHG | DIASTOLIC BLOOD PRESSURE: 73 MMHG | HEART RATE: 98 BPM | HEIGHT: 66 IN | TEMPERATURE: 98 F | BODY MASS INDEX: 36.16 KG/M2 | RESPIRATION RATE: 16 BRPM | WEIGHT: 225 LBS | OXYGEN SATURATION: 98 %

## 2018-03-29 DIAGNOSIS — R22.40: ICD-10-CM

## 2018-03-29 LAB
B-HCG UR QL: NEGATIVE
CTP QC/QA: YES

## 2018-03-29 PROCEDURE — 71000016 HC POSTOP RECOV ADDL HR: Performed by: ORTHOPAEDIC SURGERY

## 2018-03-29 PROCEDURE — 76942 ECHO GUIDE FOR BIOPSY: CPT | Mod: 26,,, | Performed by: ANESTHESIOLOGY

## 2018-03-29 PROCEDURE — 63600175 PHARM REV CODE 636 W HCPCS: Performed by: STUDENT IN AN ORGANIZED HEALTH CARE EDUCATION/TRAINING PROGRAM

## 2018-03-29 PROCEDURE — 27324 BIOPSY THIGH SOFT TISSUES: CPT | Mod: LT,,, | Performed by: ORTHOPAEDIC SURGERY

## 2018-03-29 PROCEDURE — 64447 NJX AA&/STRD FEMORAL NRV IMG: CPT | Performed by: ANESTHESIOLOGY

## 2018-03-29 PROCEDURE — 63600175 PHARM REV CODE 636 W HCPCS: Performed by: ANESTHESIOLOGY

## 2018-03-29 PROCEDURE — 36000706: Performed by: ORTHOPAEDIC SURGERY

## 2018-03-29 PROCEDURE — 88331 PATH CONSLTJ SURG 1 BLK 1SPC: CPT | Mod: 26,,, | Performed by: PATHOLOGY

## 2018-03-29 PROCEDURE — 25000003 PHARM REV CODE 250: Performed by: STUDENT IN AN ORGANIZED HEALTH CARE EDUCATION/TRAINING PROGRAM

## 2018-03-29 PROCEDURE — D9220A PRA ANESTHESIA: Mod: ,,, | Performed by: ANESTHESIOLOGY

## 2018-03-29 PROCEDURE — 88307 TISSUE EXAM BY PATHOLOGIST: CPT | Mod: 26,,, | Performed by: PATHOLOGY

## 2018-03-29 PROCEDURE — 71000044 HC DOSC ROUTINE RECOVERY FIRST HOUR: Performed by: ORTHOPAEDIC SURGERY

## 2018-03-29 PROCEDURE — 81025 URINE PREGNANCY TEST: CPT | Performed by: ORTHOPAEDIC SURGERY

## 2018-03-29 PROCEDURE — 37000009 HC ANESTHESIA EA ADD 15 MINS: Performed by: ORTHOPAEDIC SURGERY

## 2018-03-29 PROCEDURE — 25000003 PHARM REV CODE 250: Performed by: ORTHOPAEDIC SURGERY

## 2018-03-29 PROCEDURE — 37000008 HC ANESTHESIA 1ST 15 MINUTES: Performed by: ORTHOPAEDIC SURGERY

## 2018-03-29 PROCEDURE — 88331 PATH CONSLTJ SURG 1 BLK 1SPC: CPT | Performed by: PATHOLOGY

## 2018-03-29 PROCEDURE — 36000707: Performed by: ORTHOPAEDIC SURGERY

## 2018-03-29 PROCEDURE — 71000015 HC POSTOP RECOV 1ST HR: Performed by: ORTHOPAEDIC SURGERY

## 2018-03-29 RX ORDER — PROPOFOL 10 MG/ML
VIAL (ML) INTRAVENOUS
Status: DISCONTINUED | OUTPATIENT
Start: 2018-03-29 | End: 2018-03-29

## 2018-03-29 RX ORDER — OXYCODONE HYDROCHLORIDE 5 MG/1
15 TABLET ORAL EVERY 4 HOURS PRN
Status: DISCONTINUED | OUTPATIENT
Start: 2018-03-29 | End: 2018-03-29 | Stop reason: HOSPADM

## 2018-03-29 RX ORDER — CEFAZOLIN SODIUM 1 G/3ML
2 INJECTION, POWDER, FOR SOLUTION INTRAMUSCULAR; INTRAVENOUS
Status: COMPLETED | OUTPATIENT
Start: 2018-03-29 | End: 2018-03-29

## 2018-03-29 RX ORDER — ACETAMINOPHEN 10 MG/ML
1000 INJECTION, SOLUTION INTRAVENOUS ONCE
Status: DISCONTINUED | OUTPATIENT
Start: 2018-03-29 | End: 2018-03-29 | Stop reason: HOSPADM

## 2018-03-29 RX ORDER — LIDOCAINE HYDROCHLORIDE 10 MG/ML
1 INJECTION, SOLUTION EPIDURAL; INFILTRATION; INTRACAUDAL; PERINEURAL ONCE
Status: COMPLETED | OUTPATIENT
Start: 2018-03-29 | End: 2018-03-29

## 2018-03-29 RX ORDER — OXYCODONE HYDROCHLORIDE 5 MG/1
10 TABLET ORAL EVERY 4 HOURS PRN
Status: DISCONTINUED | OUTPATIENT
Start: 2018-03-29 | End: 2018-03-29 | Stop reason: HOSPADM

## 2018-03-29 RX ORDER — LIDOCAINE HCL/PF 100 MG/5ML
SYRINGE (ML) INTRAVENOUS
Status: DISCONTINUED | OUTPATIENT
Start: 2018-03-29 | End: 2018-03-29

## 2018-03-29 RX ORDER — ONDANSETRON 8 MG/1
8 TABLET, ORALLY DISINTEGRATING ORAL EVERY 8 HOURS PRN
Status: DISCONTINUED | OUTPATIENT
Start: 2018-03-29 | End: 2018-03-29 | Stop reason: HOSPADM

## 2018-03-29 RX ORDER — SODIUM CHLORIDE 9 MG/ML
INJECTION, SOLUTION INTRAVENOUS CONTINUOUS
Status: DISCONTINUED | OUTPATIENT
Start: 2018-03-29 | End: 2018-03-29 | Stop reason: HOSPADM

## 2018-03-29 RX ORDER — OXYCODONE AND ACETAMINOPHEN 5; 325 MG/1; MG/1
1 TABLET ORAL EVERY 4 HOURS PRN
Qty: 41 TABLET | Refills: 0 | Status: SHIPPED | OUTPATIENT
Start: 2018-03-29 | End: 2019-04-11

## 2018-03-29 RX ORDER — FENTANYL CITRATE 50 UG/ML
25 INJECTION, SOLUTION INTRAMUSCULAR; INTRAVENOUS SEE ADMIN INSTRUCTIONS
Status: DISCONTINUED | OUTPATIENT
Start: 2018-03-29 | End: 2018-03-29 | Stop reason: HOSPADM

## 2018-03-29 RX ORDER — ONDANSETRON 2 MG/ML
INJECTION INTRAMUSCULAR; INTRAVENOUS
Status: DISCONTINUED | OUTPATIENT
Start: 2018-03-29 | End: 2018-03-29

## 2018-03-29 RX ORDER — MIDAZOLAM HYDROCHLORIDE 1 MG/ML
2 INJECTION INTRAMUSCULAR; INTRAVENOUS SEE ADMIN INSTRUCTIONS
Status: DISCONTINUED | OUTPATIENT
Start: 2018-03-29 | End: 2018-03-29 | Stop reason: HOSPADM

## 2018-03-29 RX ORDER — PROPOFOL 10 MG/ML
VIAL (ML) INTRAVENOUS CONTINUOUS PRN
Status: DISCONTINUED | OUTPATIENT
Start: 2018-03-29 | End: 2018-03-29

## 2018-03-29 RX ORDER — SODIUM CHLORIDE 0.9 % (FLUSH) 0.9 %
3 SYRINGE (ML) INJECTION
Status: DISCONTINUED | OUTPATIENT
Start: 2018-03-29 | End: 2018-03-29 | Stop reason: HOSPADM

## 2018-03-29 RX ORDER — DEXAMETHASONE SODIUM PHOSPHATE 4 MG/ML
INJECTION, SOLUTION INTRA-ARTICULAR; INTRALESIONAL; INTRAMUSCULAR; INTRAVENOUS; SOFT TISSUE
Status: DISCONTINUED | OUTPATIENT
Start: 2018-03-29 | End: 2018-03-29

## 2018-03-29 RX ORDER — ONDANSETRON 4 MG/1
4 TABLET, FILM COATED ORAL EVERY 12 HOURS PRN
Status: DISCONTINUED | OUTPATIENT
Start: 2018-03-29 | End: 2018-03-29 | Stop reason: HOSPADM

## 2018-03-29 RX ORDER — FENTANYL CITRATE 50 UG/ML
INJECTION, SOLUTION INTRAMUSCULAR; INTRAVENOUS
Status: DISCONTINUED | OUTPATIENT
Start: 2018-03-29 | End: 2018-03-29

## 2018-03-29 RX ORDER — KETAMINE HYDROCHLORIDE 100 MG/ML
INJECTION, SOLUTION INTRAMUSCULAR; INTRAVENOUS
Status: DISCONTINUED | OUTPATIENT
Start: 2018-03-29 | End: 2018-03-29

## 2018-03-29 RX ORDER — FENTANYL CITRATE 50 UG/ML
25 INJECTION, SOLUTION INTRAMUSCULAR; INTRAVENOUS EVERY 5 MIN PRN
Status: DISCONTINUED | OUTPATIENT
Start: 2018-03-29 | End: 2018-03-29 | Stop reason: HOSPADM

## 2018-03-29 RX ADMIN — OXYCODONE HYDROCHLORIDE 15 MG: 5 TABLET ORAL at 09:03

## 2018-03-29 RX ADMIN — MIDAZOLAM HYDROCHLORIDE 2 MG: 1 INJECTION, SOLUTION INTRAMUSCULAR; INTRAVENOUS at 07:03

## 2018-03-29 RX ADMIN — LIDOCAINE HYDROCHLORIDE 0.1 MG: 10 INJECTION, SOLUTION EPIDURAL; INFILTRATION; INTRACAUDAL; PERINEURAL at 06:03

## 2018-03-29 RX ADMIN — PROPOFOL 75 MCG/KG/MIN: 10 INJECTION, EMULSION INTRAVENOUS at 08:03

## 2018-03-29 RX ADMIN — KETAMINE HYDROCHLORIDE 20 MG: 100 INJECTION, SOLUTION, CONCENTRATE INTRAMUSCULAR; INTRAVENOUS at 08:03

## 2018-03-29 RX ADMIN — FENTANYL CITRATE 50 MCG: 50 INJECTION, SOLUTION INTRAMUSCULAR; INTRAVENOUS at 07:03

## 2018-03-29 RX ADMIN — ONDANSETRON 8 MG: 8 TABLET, ORALLY DISINTEGRATING ORAL at 09:03

## 2018-03-29 RX ADMIN — DEXAMETHASONE SODIUM PHOSPHATE 8 MG: 4 INJECTION, SOLUTION INTRAMUSCULAR; INTRAVENOUS at 08:03

## 2018-03-29 RX ADMIN — CEFAZOLIN 2 G: 330 INJECTION, POWDER, FOR SOLUTION INTRAMUSCULAR; INTRAVENOUS at 08:03

## 2018-03-29 RX ADMIN — SODIUM CHLORIDE: 0.9 INJECTION, SOLUTION INTRAVENOUS at 06:03

## 2018-03-29 RX ADMIN — FENTANYL CITRATE 50 MCG: 50 INJECTION, SOLUTION INTRAMUSCULAR; INTRAVENOUS at 08:03

## 2018-03-29 RX ADMIN — LIDOCAINE HYDROCHLORIDE 40 MG: 20 INJECTION, SOLUTION INTRAVENOUS at 08:03

## 2018-03-29 RX ADMIN — PROPOFOL 20 MG: 10 INJECTION, EMULSION INTRAVENOUS at 08:03

## 2018-03-29 RX ADMIN — FENTANYL CITRATE 25 MCG: 50 INJECTION, SOLUTION INTRAMUSCULAR; INTRAVENOUS at 09:03

## 2018-03-29 RX ADMIN — FENTANYL CITRATE 25 MCG: 50 INJECTION, SOLUTION INTRAMUSCULAR; INTRAVENOUS at 10:03

## 2018-03-29 RX ADMIN — ONDANSETRON 4 MG: 2 INJECTION INTRAMUSCULAR; INTRAVENOUS at 09:03

## 2018-03-29 RX ADMIN — PROPOFOL 200 MG: 10 INJECTION, EMULSION INTRAVENOUS at 08:03

## 2018-03-29 RX ADMIN — SODIUM CHLORIDE: 0.9 INJECTION, SOLUTION INTRAVENOUS at 07:03

## 2018-03-29 RX ADMIN — MIDAZOLAM HYDROCHLORIDE 2 MG: 1 INJECTION, SOLUTION INTRAMUSCULAR; INTRAVENOUS at 10:03

## 2018-03-29 NOTE — ANESTHESIA POSTPROCEDURE EVALUATION
"Anesthesia Post Evaluation    Patient: Rosangela Salazar    Procedure(s) Performed: Procedure(s) (LRB):  [id number] (Left)    Final Anesthesia Type: general  Patient location during evaluation: PACU  Patient participation: Yes- Able to Participate  Level of consciousness: awake and alert and oriented  Post-procedure vital signs: reviewed and stable  Pain management: adequate  Airway patency: patent  PONV status at discharge: No PONV  Anesthetic complications: no      Cardiovascular status: blood pressure returned to baseline  Respiratory status: unassisted, room air and spontaneous ventilation  Hydration status: euvolemic  Follow-up not needed.        Visit Vitals  /79   Pulse 87   Temp 36.4 °C (97.5 °F) (Temporal)   Resp 16   Ht 5' 6" (1.676 m)   Wt 102.1 kg (225 lb)   LMP 03/15/2018   SpO2 97%   Breastfeeding? No   BMI 36.32 kg/m²       Pain/Napoleon Score: Pain Assessment Performed: Yes (3/29/2018 10:15 AM)  Presence of Pain: non-verbal indicators absent (3/29/2018 10:15 AM)  Pain Rating Prior to Med Admin: 7 (3/29/2018  9:55 AM)  Pain Rating Post Med Admin: 0 (3/29/2018 10:15 AM)  Napoleon Score: 10 (3/29/2018  9:36 AM)      "

## 2018-03-29 NOTE — H&P
H&P from 3/14.  Needle biopsy was non-diagnostic.  Recommended to proceed with open biopsy.  If frozen section reveals benign changes will proceed with excision.  Risks and complications were discussed including but not limited to the risks of anesthetic complications, infection, wound healing complications, pain, stiffness, DVT, pulmonary embolism, perioperative medical risks (cardiac, pulmonary, renal, neurologic), and death among others were discussed, including the risk of local recurrence and metastatic disease.  The patient elects to proceed.      CHIEF COMPLAINT:  left lower extremity soft-tissue mass.                                                                                                                        The patient is seen in consultation from Dr. Mercer.                                                                                            HISTORY OF PRESENT ILLNESS:  The patient is a 28 y.o. female  who presents  for evaluation of her left distal thigh mass. She had an injury 2 months ago to her left knee.  She had a mass develop rapidly over 2 days and it has remained stable since.  Minimal pain.  She had imaging (MRI) worrisome for tumor and presents for recommendations.     Pain Duration: 2 months  Pain Quality: dull  Pain Context:improving  Pain Timing: intermittent  Pain Severity: mild     Night pain: Negative     Growth: Yes - over 2 days, then stable.     History of skin warmth/erythema/changes: No     She  presents for further treatment recommendations.   She denies cough, sputum production, shortness of breath,   fever, chills, night sweats or weight loss.  She denies distal paresthesias.                                                                                                      PAST MEDICAL HISTORY:         Past Medical History:   Diagnosis Date    Allergy       seasonal    Gall bladder inflammation     .                                                PAST  SURGICAL HISTORY:          Past Surgical History:   Procedure Laterality Date    COLONOSCOPY   01/2013    WISDOM TOOTH EXTRACTION                Current Outpatient Prescriptions:     CETIRIZINE HCL (ZYRTEC ORAL), , Disp: , Rfl:     fluconazole (DIFLUCAN) 150 MG Tab, Take one tablet today and repeat in 2 days, Disp: 2 tablet, Rfl: 0    norgestrel-ethinyl estradiol (CRYSELLE, 28,) 0.3-30 mg-mcg per tablet, Take 1 tablet by mouth once daily., Disp: 90 tablet, Rfl: 2    ranitidine (ZANTAC) 150 MG tablet, Take 150 mg by mouth nightly., Disp: , Rfl:     tramadol (ULTRAM) 50 mg tablet, TAKE 1 TABLET BY MOUTH EVERY 6 TO 8 HOURS AS NEEDED FOR PAIN, Disp: , Rfl: 0     SOCIAL HISTORY:  Reviewed per EPIC history for tobacco or alcohol use and she  is an active  28 y.o.  female                                                                             FAMILY MEDICAL HISTORY:  family history includes Colon cancer in her paternal grandmother; Diabetes in her cousin, maternal aunt, maternal grandfather, maternal grandmother, maternal uncle, and mother; No Known Problems in her father; Ovarian cancer in her maternal aunt and maternal aunt.                                                                                REVIEW OF SYSTEMS:  She denies other arthralgias or back pain.   Denies chest pain, palpitations, shortness of breath.   Denies excessive thirst, urination or heat or cold intolerance.   Denies nausea, vomiting, melena or hematochezia.    Denies fever, chills, night sweats, weight loss.    Denies dysuria or hematuria.   Denies history of anxiety or depression.   Denies any skin abnormalities or rash.   Denies upper or lower extremity paresthesias or lightheadedness.    Denies cough, shortness of breath or hemoptysis.                                                                                PHYSICAL EXAMINATION:                                                        GENERAL:  A well-developed, well-nourished  28 y.o. female who is alert and       oriented in no acute distress.       Gait: She  walks with a normal gait.                   EXTREMITIES:  Examination of the extremities reveals there is a visible mass over the left medial distal thigh.     The skin over both lower extremities is normal and unremarkable.  She has a   painless range of motion of the hips, knees and ankles            bilaterally.  There is no inguinal adenopathy bilaterally.  Sensation is     intact in both lower extremities.  There are no motor deficits in the lower  extremities bilaterally.  Pedal pulses are palpable distally bilaterally.    She has no calf tenderness to palpation nor edema.  She has a soft palpable mass ~ 18 cm at the level of the VMO.     HEENT: normocephalic and atraumatic, EOMI, sclera white.  Oropharanx unremarkable  Heart: RRR   Lungs: Non-labored breath.ing  Abdomen: soft, non-tender        The MRI was personally reviewed with the patient.  The lesion shows overall low/isointense with muscle  signal intensity on T-1 weighed images and heterogenous higher signal intensity on H2O weighted sequences.       There  is enhancement on post-contrast images in some areas of the lesion with large areas of nonenhancement.  There are several large vascular channels entering the mass and within the mass.     The imaging studies are not diagnostic of a specific diagnosis or etiology, but based on the imaging and clinical history an underlying AVM or hemangioma with internal bleed is a consideration although soft-tissue sarcoma or other benign or malignant neoplasms are certainly in the differential.                                                                               IMPRESSION: Left thigh soft tissue mass                                                                                                                  PLAN:  It was discussed with the patient that the only way to obtain or confirm the diagnosis is via  histologic evaluation of the lesion.  This can be done through open or needle biopsy.  I have discussed that the lesion is amenable to needle biopsy and would be my preferred method of biopsy.  The procedure was discussed with the patient along with potential complications (bleeding, nerve injury, pain, swelling).  The most considerable risk is not obtaining a satisfactory sample for diagnosis which would potentially necessitate open surgical biopsy.  The patient wishes to proceed with needle biopsy.     After time out was performed and patient ID, side, and site were verified, the site was sterilly prepped in the standard fashion.  A 25-gauge needle was then used to anesthesize the skin and subcutaneous tissues with 1% lidocaine without epinepherine. A 14-gauge needle biopsy gun was then placed into the lesion and several cores of tissue were obtained via a single entry site.  The tissue samples were placed in formalin and labelled.  Hemostasis was obtained with manual pressure and   compressive dressing was applied.  The patient tolerated the procedure well without complication.     The patient was informed that they would be informed of the results of the biopsy once they were released by pathology.

## 2018-03-29 NOTE — PLAN OF CARE
Discharge instructions and paper prescription given to pt, verbalized understanding.  Pain controlled, VSS, no complaints of nausea.  Knee immobilizer placed on pt.  IV removed, pt has all belongings, family at bedside to help pt get dressed.

## 2018-03-29 NOTE — ANESTHESIA PROCEDURE NOTES
Left Femoral    Patient location during procedure: post-op   Block not for primary anesthetic.  Reason for block: at surgeon's request and post-op pain management   Post-op Pain Location: L knee  Start time: 3/29/2018 10:55 AM  Timeout: 3/29/2018 10:53 AM   End time: 3/29/2018 11:00 AM  Staffing  Anesthesiologist: BRADY LEZAMA  Performed: anesthesiologist   Preanesthetic Checklist  Completed: patient identified, site marked, surgical consent, pre-op evaluation, timeout performed, IV checked, risks and benefits discussed and monitors and equipment checked  Peripheral Block  Patient position: supine  Prep: ChloraPrep  Patient monitoring: heart rate, cardiac monitor, continuous pulse ox, continuous capnometry and frequent blood pressure checks  Block type: femoral  Laterality: left  Injection technique: single shot  Needle  Needle type: Stimuplex   Needle gauge: 22 G  Needle length: 2 in  Needle localization: anatomical landmarks and ultrasound guidance   -ultrasound image captured on disc.  Assessment  Injection assessment: negative aspiration, negative parasthesia and local visualized surrounding nerve  Paresthesia pain: none  Heart rate change: no  Slow fractionated injection: yes  Medications:  Bolus administered: 20 mL of 0.25 ropivacaine  Epinephrine added: 3.75 mcg/mL (1/300,000)  Additional Notes  VSS.  [facility] RN monitoring vitals throughout procedure.  Patient tolerated procedure well.

## 2018-03-29 NOTE — INTERVAL H&P NOTE
The patient has been examined and the H&P has been reviewed:    I concur with the findings and no changes have occurred since H&P was written.    Anesthesia/Surgery risks, benefits and alternative options discussed and understood by patient/family.          Active Hospital Problems    Diagnosis  POA    Mass of knee [M25.869]  Yes      Resolved Hospital Problems    Diagnosis Date Resolved POA   No resolved problems to display.

## 2018-03-29 NOTE — TRANSFER OF CARE
"Anesthesia Transfer of Care Note    Patient: Rosangela Salazar    Procedure(s) Performed: Procedure(s) (LRB):  [id number] (Left)    Patient location: [facility]    Anesthesia Type: general    Transport from OR: Transported from OR on 2-3 L/min O2 by NC with adequate spontaneous ventilation    Post pain: adequate analgesia    Post assessment: no apparent anesthetic complications    Post vital signs: stable    Level of consciousness: alert and awake    Nausea/Vomiting: no nausea/vomiting    Complications: none    Transfer of care protocol was followed      Last vitals:   Visit Vitals  /82 (BP Location: Right arm, Patient Position: Lying)   Pulse 98   Temp 36.7 °C (98.1 °F) (Oral)   Resp 14   Ht 5' 6" (1.676 m)   Wt 102.1 kg (225 lb)   LMP 03/15/2018   SpO2 99%   Breastfeeding? No   BMI 36.32 kg/m²     "

## 2018-03-29 NOTE — PLAN OF CARE
Pt pre op completed, surgery team at bedside obtaining pt consent, anesthesia team pending. Mother and  at bedside prior to procedure and will take her belongings. VSS on RA, no pain or nausea noted, slightly anxious with family keeping her comfortable.

## 2018-03-29 NOTE — ANESTHESIA PROCEDURE NOTES
Left adductor canal single shot    Patient location during procedure: holding area   Block not for primary anesthetic.  Reason for block: at surgeon's request and post-op pain management   Post-op Pain Location: Left knee pain  Start time: 3/29/2018 7:20 AM  Timeout: 3/29/2018 7:20 AM   End time: 3/29/2018 7:25 AM  Staffing  Anesthesiologist: PUSHPA DUKES  Performed: anesthesiologist   Preanesthetic Checklist  Completed: patient identified, site marked, surgical consent, pre-op evaluation, timeout performed, IV checked, risks and benefits discussed and monitors and equipment checked  Peripheral Block  Patient position: supine  Prep: ChloraPrep  Patient monitoring: heart rate, cardiac monitor, continuous pulse ox, frequent blood pressure checks and continuous capnometry  Block type: adductor canal  Laterality: left  Injection technique: single shot  Needle  Needle type: Tuohy   Needle gauge: 20 G  Needle length: 4 in  Needle localization: ultrasound guidance   -ultrasound image captured on disc.  Assessment  Injection assessment: negative aspiration, negative parasthesia and local visualized surrounding nerve  Paresthesia pain: none  Heart rate change: no  Slow fractionated injection: yes  Medications:  Bolus administered: 20 mL of 0.25 ropivacaine  Epinephrine added: 3.75 mcg/mL (1/300,000)

## 2018-03-29 NOTE — DISCHARGE INSTRUCTIONS
Ace wrap to be removed in 48 hours  Aqua seal dressing to be removed in10 days  Can shower in 48 hours, no baths    Call clinic for report

## 2018-03-29 NOTE — BRIEF OP NOTE
[facility]  Brief Operative Note     SUMMARY     Surgery Date: 3/29/2018     Surgeon(s) and Role:     * Stephan Mauricio MD - Primary     * Stephan Mauricio MD - Primary    Assisting Surgeon: None    Pre-op Diagnosis:  Soft tissue mass [M79.9]    Post-op Diagnosis:  Post-Op Diagnosis Codes:     * Soft tissue mass [M79.9]    Procedure(s) (LRB):  [id number] (Left)    Anesthesia: Regional    Description of the findings of the procedure: excisional biopsy of left knee mass    Findings/Key Components: excisional biopsy of left knee mass    Estimated Blood Loss: 50 cc         Specimens:   Specimen (12h ago through future)    Start     Ordered    03/29/18 0914  Specimen to Pathology - Surgery  Once     Comments:  1.  Left thigh mass, frozen, to pathology per Dr. Mauricio.2.  Left thigh mass, permanent, placed in pathology refrigerator.      03/29/18 0913          Discharge Note    SUMMARY     Admit Date: 3/29/2018    Discharge Date and Time:  03/29/2018 9:37 AM    Hospital Course (synopsis of major diagnoses, care, treatment, and services provided during the course of the hospital stay): Pt admitted for outpatient procedure, tolerated well.  Recovered in PACU and was discharged home on day of surgery.        Final Diagnosis: Post-Op Diagnosis Codes:     * Soft tissue mass [M79.9]    Disposition: Home or Self Care    Follow Up/Patient Instructions:     Medications:  Reconciled Home Medications:      Medication List      START taking these medications    oxyCODONE-acetaminophen 5-325 mg per tablet  Commonly known as:  PERCOCET  Take 1 tablet by mouth every 4 (four) hours as needed.        CONTINUE taking these medications    fluconazole 150 MG Tab  Commonly known as:  DIFLUCAN  Take one tablet today and repeat in 2 days     norgestrel-ethinyl estradiol 0.3-30 mg-mcg per tablet  Commonly known as:  CRYSELLE (28)  Take 1 tablet by mouth once daily.     ranitidine 150 MG tablet  Commonly known as:   ZANTAC     traMADol 50 mg tablet  Commonly known as:  ULTRAM     ZYRTEC ORAL           Where to Get Your Medications      You can get these medications from any pharmacy    Bring a paper prescription for each of these medications  · oxyCODONE-acetaminophen 5-325 mg per tablet         Discharge Procedure Orders  Diet general     Call MD for:  temperature >100.4     Call MD for:  persistent nausea and vomiting     Call MD for:  severe uncontrolled pain     Call MD for:  difficulty breathing, headache or visual disturbances     Call MD for:  redness, tenderness, or signs of infection (pain, swelling, redness, odor or green/yellow discharge around incision site)     Call MD for:  hives     Call MD for:  persistent dizziness or light-headedness     Call MD for:  extreme fatigue     Activity as tolerated     Shower on day dressing removed (No bath)       Follow-up Information     Follow up In 2 weeks.

## 2018-04-02 NOTE — OP NOTE
DATE OF PROCEDURE:  03/29/2018.    PREOPERATIVE DIAGNOSIS:  Deep left thigh mass.    POSTOPERATIVE DIAGNOSIS:  Deep left thigh mass.    PROCEDURE:  Open biopsy, deep left thigh mass (CPT #76712).    SURGEON:  Stephan Mauricio M.D.    ASSISTANT:  Konstantin.    ANESTHESIA:  General.    ESTIMATED BLOOD LOSS:  Less than 100 mL.    INDICATIONS:  The patient is a 28-year-old female who presented with spontaneous   onset of a left distal thigh mass.  This occurred after injury.  She had an MRI   scan that revealed an enhancing lesion of the left distal thigh deep to the   vastus medialis obliquus muscle.  Needle biopsy of this was nondiagnostic.    Treatment options were discussed, and due to the significant enhancement on MRI   scan suspicious for underlying neoplasm, it was recommended to proceed with open   biopsy.  Risks and complications were discussed to her satisfaction and she   elected to proceed.    DESCRIPTION OF PROCEDURE:  The patient was taken to the Operating Room where   general anesthesia was administered by the Anesthesia Department.  The left   lower extremity was sterilely prepped and draped in a normal fashion.    A 3 cm longitudinal incision was made directly over the mass over the   anteromedial aspect of the thigh distally overlying the VMO muscle.    Subcutaneous tissue was dissected with electrocautery down to the deep fascia,   which was incised and the vastus medialis obliquus muscle was then split along   the line of its fibers.  The mass was then identified deep to this.  It was   incised and significant old hematoma cavity was evacuated.  Within the cavity   was noted to be significant fibrinous type tissue.  Multiple biopsy samples for   frozen section were obtained from around the periphery of this cavity to   minimize the chance of sampling error.  Frozen section analysis was obtained.    There was no devante neoplasm identified, but there was significant necrosis and   reparative granulation tissue  and fibrosis present.  Further tissue sample was   then obtained for permanent pathology.  Again, generous sample was obtained   throughout the periphery to minimize sampling error.  This was placed in   formalin.  The wound was then irrigated and bleeding was stopped using pressure.    The deep fascia was then closed with a running stitch of #1 Vicryl.    Subcutaneous tissue was closed with interrupted inverted stitches of #3-0   Vicryl.  Skin was approximated using a running subcuticular stitch of #3-0   Monocryl and Dermabond.  Sterile dressing was applied.  General anesthesia was   reversed and she was returned to the Postanesthesia Care Unit in stable   condition.      MSM/CHRIS  dd: 03/29/2018 09:34:03 (CDT)  td: 03/29/2018 13:53:41 (CDT)  Doc ID   #[id number]  Job ID #[id number]    CC:

## 2018-04-06 ENCOUNTER — TELEPHONE (OUTPATIENT)
Dept: ORTHOPEDICS | Facility: CLINIC | Age: 28
End: 2018-04-06

## 2018-04-06 NOTE — TELEPHONE ENCOUNTER
Spoke to pt and informed her that the results are not in yet and she is scheduled with Laney Morrell on 04/12. Pt verbalized understanding.          ----- Message from Francine Hi sent at 4/6/2018 10:43 AM CDT -----  Contact: self  Pt called requesting a return call back from Ms. Quinn regarding her 2 wk post op appt and result from her biopsy. Pt could be reached at [phone]

## 2018-04-07 ENCOUNTER — NURSE TRIAGE (OUTPATIENT)
Dept: ADMINISTRATIVE | Facility: CLINIC | Age: 28
End: 2018-04-07

## 2018-04-08 ENCOUNTER — NURSE TRIAGE (OUTPATIENT)
Dept: ADMINISTRATIVE | Facility: CLINIC | Age: 28
End: 2018-04-08

## 2018-04-08 DIAGNOSIS — Z01.419 ENCOUNTER FOR ROUTINE GYNECOLOGICAL EXAMINATION: ICD-10-CM

## 2018-04-08 NOTE — TELEPHONE ENCOUNTER
Reason for Disposition   Caller has URGENT question and triager unable to answer question    Protocols used: ST POST-OP SYMPTOMS AND [id number]    Caller stated that her left knee is bleeding after surgery 10 days ago. Reported that the dressing is 90% saturated with blood which is more than yesterday. Notified Dr. Hinojosa who stated that the patient can reinforce the dressing, be seen in the clinic tomorrow or be evaluated in the ED  Informed that I would send the provider a message for when the office opens tomorrow. Instructed to call back with any additional problems and/or concerns  1017-Dr. Hinojosa called back and wanted to speak with the patient directly. Told the patient to call tomorrow to be seen in the clinic in addition to reinforcement

## 2018-04-09 ENCOUNTER — OFFICE VISIT (OUTPATIENT)
Dept: ORTHOPEDICS | Facility: CLINIC | Age: 28
End: 2018-04-09
Payer: COMMERCIAL

## 2018-04-09 VITALS
SYSTOLIC BLOOD PRESSURE: 140 MMHG | TEMPERATURE: 98 F | WEIGHT: 228 LBS | HEIGHT: 66 IN | DIASTOLIC BLOOD PRESSURE: 91 MMHG | BODY MASS INDEX: 36.64 KG/M2 | HEART RATE: 108 BPM

## 2018-04-09 DIAGNOSIS — Z98.890 POSTOPERATIVE STATE: Primary | ICD-10-CM

## 2018-04-09 PROCEDURE — 99024 POSTOP FOLLOW-UP VISIT: CPT | Mod: S$GLB,,, | Performed by: ORTHOPAEDIC SURGERY

## 2018-04-09 PROCEDURE — 99999 PR PBB SHADOW E&M-EST. PATIENT-LVL III: CPT | Mod: PBBFAC,,, | Performed by: ORTHOPAEDIC SURGERY

## 2018-04-09 RX ORDER — NORGESTREL AND ETHINYL ESTRADIOL 0.3-0.03MG
1 KIT ORAL DAILY
Qty: 84 TABLET | Refills: 0 | Status: SHIPPED | OUTPATIENT
Start: 2018-04-09 | End: 2018-06-20 | Stop reason: SDUPTHER

## 2018-04-09 NOTE — TELEPHONE ENCOUNTER
Call pt to schedule annual. Overdue. Needs to make appt for more refills. I just refilled her Rx request

## 2018-04-14 ENCOUNTER — NURSE TRIAGE (OUTPATIENT)
Dept: ADMINISTRATIVE | Facility: CLINIC | Age: 28
End: 2018-04-14

## 2018-04-14 NOTE — TELEPHONE ENCOUNTER
"questions about attending to wound surg 3/29  Pt called re wound care. Pt wants to know if cleaning necessary.     Reason for Disposition   Caller has URGENT question and triager unable to answer question    Answer Assessment - Initial Assessment Questions  1. SYMPTOM: "What's the main symptom you're concerned about?" (e.g., redness, pain, drainage)     Incision looks fine, black around it/ dried blood or scab.   2. ONSET: "When did ________  start?"   past few days. No drainage since fri 4/13  3. SURGERY: "What surgery was performed?"     L leg   4. DATE of SURGERY: "When was surgery performed?"     3/29  5. INCISION SITE: "Where is the incision located?"      L knee   6. REDNESS: "Is there any redness at the incision site?" If yes, ask: "How wide across is the redness?" (Inches, centimeters)      No   7. PAIN: "Is there any pain?" If so, ask: "How bad is it?"  (Scale 1-10; or mild, moderate, severe)     1-2, no pain meds x 1 week   8. BLEEDING: "Is there any bleeding?" If so, ask: "How much?" and "Where?"    No   9. DRAINAGE: "Is there any drainage from the incision site?" If yes, ask: "What color and how much?" (e.g., red, cloudy, pus; drops, teaspoon)     No   10. FEVER: "Do you have a fever?" If so, ask: "What is your temperature, how was it measured, and when did it start?"      afeb   11. OTHER SYMPTOMS: "Do you have any other symptoms?" (e.g., shaking chills, weakness, rash elsewhere on body)      No , eating reg diet    Protocols used: ST POST-OP INCISION SYMPTOMS-A-AH  pt wants to know if antibacterial soap Hibaclens or Polysporin rec to care for wound. Ok to shower allow water and bath soap to run over wound. Spoke with dr mcdonald rec clean and dry, no special soap or ointments. Pt notified. Sami back with questions.   "

## 2018-05-07 ENCOUNTER — OFFICE VISIT (OUTPATIENT)
Dept: URGENT CARE | Facility: CLINIC | Age: 28
End: 2018-05-07
Payer: COMMERCIAL

## 2018-05-07 VITALS
RESPIRATION RATE: 18 BRPM | DIASTOLIC BLOOD PRESSURE: 95 MMHG | HEART RATE: 95 BPM | OXYGEN SATURATION: 99 % | SYSTOLIC BLOOD PRESSURE: 135 MMHG | TEMPERATURE: 99 F | BODY MASS INDEX: 36.64 KG/M2 | WEIGHT: 228 LBS | HEIGHT: 66 IN

## 2018-05-07 DIAGNOSIS — L23.7 POISON OAK DERMATITIS: Primary | ICD-10-CM

## 2018-05-07 PROCEDURE — 99214 OFFICE O/P EST MOD 30 MIN: CPT | Mod: S$GLB,,, | Performed by: PHYSICIAN ASSISTANT

## 2018-05-07 RX ORDER — PREDNISONE 10 MG/1
TABLET ORAL
Qty: 25 TABLET | Refills: 0 | Status: SHIPPED | OUTPATIENT
Start: 2018-05-07 | End: 2019-04-11

## 2018-05-07 NOTE — PROGRESS NOTES
"Subjective:       Patient ID: Rosangela Salazar is a 28 y.o. female.    Vitals:  height is 5' 6" (1.676 m) and weight is 103.4 kg (228 lb). Her temperature is 99.3 °F (37.4 °C). Her blood pressure is 135/95 (abnormal) and her pulse is 95. Her respiration is 18 and oxygen saturation is 99%.     Chief Complaint: Rash    Patient has a rash/blisters to left eye, she was pulling weeds and touched left eye.       Rash   This is a new problem. The current episode started yesterday. The problem is unchanged. The affected locations include the face and left eye. The rash is characterized by redness and itchiness. She was exposed to plant contact. Pertinent negatives include no fever, joint pain, shortness of breath or sore throat. Past treatments include nothing.     Review of Systems   Constitution: Negative for chills and fever.   HENT: Negative for sore throat.    Respiratory: Negative for shortness of breath.    Skin: Positive for color change, itching and rash.   Musculoskeletal: Negative for joint pain.       Objective:      Physical Exam   Constitutional: She is oriented to person, place, and time. She appears well-developed and well-nourished.   HENT:   Head: Normocephalic and atraumatic. Head is without abrasion, without contusion and without laceration.   Right Ear: External ear normal.   Left Ear: External ear normal.   Nose: Nose normal.   Mouth/Throat: Oropharynx is clear and moist.   Eyes: Conjunctivae, EOM and lids are normal. Pupils are equal, round, and reactive to light. Right eye exhibits no discharge and no exudate. No foreign body present in the right eye. Left eye exhibits no discharge and no exudate. No foreign body present in the left eye. Left conjunctiva is not injected. Left conjunctiva has no hemorrhage.       Neck: Trachea normal, full passive range of motion without pain and phonation normal. Neck supple.   Cardiovascular: Normal rate, regular rhythm and normal heart sounds.    Pulmonary/Chest: Effort " normal and breath sounds normal. No stridor. No respiratory distress.   Musculoskeletal: Normal range of motion.   Neurological: She is alert and oriented to person, place, and time.   Skin: Skin is warm, dry and intact. Capillary refill takes less than 2 seconds. No abrasion, no bruising, no burn, no ecchymosis, no laceration, no lesion and no rash noted. No erythema.   Psychiatric: She has a normal mood and affect. Her speech is normal and behavior is normal. Judgment and thought content normal. Cognition and memory are normal.   Nursing note and vitals reviewed.      Assessment:       1. Poison oak dermatitis        Plan:         Poison oak dermatitis  -     predniSONE (DELTASONE) 10 MG tablet; Take 2 pills daily x 1 week, then 1 pill x 1 week, then 1/2 pill x 1 week  Dispense: 25 tablet; Refill: 0      Medications as above. If you were prescribed a narcotic medication, do not drive or operate heavy equipment or machinery while taking these medications.  You must understand that you've received an Urgent Care treatment only and that you may be released before all your medical problems are known or treated. You, the patient, will arrange for follow up care as instructed.  Follow up with your PCP or specialty clinic as directed in the next 1-2 weeks if not improved or as needed.  You can call [phone] to schedule an appointment with the appropriate provider.  If your condition worsens we recommend that you receive another evaluation at the emergency room immediately or contact your primary medical clinics after hours call service to discuss your concerns.  Please return here or go to the Emergency Department for any concerns or worsening of condition.

## 2018-05-09 DIAGNOSIS — C40.20: Primary | ICD-10-CM

## 2018-05-10 ENCOUNTER — TELEPHONE (OUTPATIENT)
Dept: URGENT CARE | Facility: CLINIC | Age: 28
End: 2018-05-10

## 2018-05-10 ENCOUNTER — TELEPHONE (OUTPATIENT)
Dept: ORTHOPEDICS | Facility: CLINIC | Age: 28
End: 2018-05-10

## 2018-05-10 NOTE — TELEPHONE ENCOUNTER
----- Message from Stephan Mauricio MD sent at 5/9/2018  6:16 PM CDT -----  Contact: self  Order is in EPIC    ----- Message -----  From: Elgin Tam  Sent: 5/9/2018   4:25 PM  To: Stephan Mauricio MD, Nicolasa Espinosa MA        ----- Message -----  From: Anastasia Benavidez  Sent: 5/9/2018   4:16 PM  To: Hang HOWELL Staff    Patient ask for a prescription for a wheelchair. Patient states she is leaving for [place] next Tuesday 5/15 and want to get the wheelchair before she leaves Patient can be reached at

## 2018-05-10 NOTE — TELEPHONE ENCOUNTER
Patient was informed that the wheelchair has been ordered and will be to her Monday at the very latest.

## 2018-06-20 ENCOUNTER — PATIENT MESSAGE (OUTPATIENT)
Dept: OBSTETRICS AND GYNECOLOGY | Facility: CLINIC | Age: 28
End: 2018-06-20

## 2018-06-20 DIAGNOSIS — Z01.419 ENCOUNTER FOR GYNECOLOGICAL EXAMINATION WITHOUT ABNORMAL FINDING: ICD-10-CM

## 2018-07-02 DIAGNOSIS — Z01.419 ENCOUNTER FOR ROUTINE GYNECOLOGICAL EXAMINATION: ICD-10-CM

## 2018-07-09 RX ORDER — NORGESTREL AND ETHINYL ESTRADIOL 0.3-0.03MG
1 KIT ORAL DAILY
Qty: 84 TABLET | Refills: 3 | Status: SHIPPED | OUTPATIENT
Start: 2018-07-09 | End: 2019-04-11 | Stop reason: SDUPTHER

## 2019-03-03 DIAGNOSIS — Z01.419 ENCOUNTER FOR GYNECOLOGICAL EXAMINATION WITHOUT ABNORMAL FINDING: ICD-10-CM

## 2019-03-04 ENCOUNTER — PATIENT MESSAGE (OUTPATIENT)
Dept: OBSTETRICS AND GYNECOLOGY | Facility: CLINIC | Age: 29
End: 2019-03-04

## 2019-03-04 RX ORDER — NORGESTREL AND ETHINYL ESTRADIOL 0.3-0.03MG
KIT ORAL
Qty: 112 TABLET | Refills: 0 | Status: SHIPPED | OUTPATIENT
Start: 2019-03-04 | End: 2019-04-11

## 2019-04-11 ENCOUNTER — OFFICE VISIT (OUTPATIENT)
Dept: OBSTETRICS AND GYNECOLOGY | Facility: CLINIC | Age: 29
End: 2019-04-11
Attending: OBSTETRICS & GYNECOLOGY
Payer: COMMERCIAL

## 2019-04-11 VITALS
WEIGHT: 245 LBS | SYSTOLIC BLOOD PRESSURE: 114 MMHG | BODY MASS INDEX: 39.37 KG/M2 | DIASTOLIC BLOOD PRESSURE: 80 MMHG | HEIGHT: 66 IN

## 2019-04-11 DIAGNOSIS — Z01.419 ENCOUNTER FOR GYNECOLOGICAL EXAMINATION (GENERAL) (ROUTINE) WITHOUT ABNORMAL FINDINGS: ICD-10-CM

## 2019-04-11 DIAGNOSIS — Z01.411 ENCOUNTER FOR GYNECOLOGICAL EXAMINATION (GENERAL) (ROUTINE) WITH ABNORMAL FINDINGS: ICD-10-CM

## 2019-04-11 DIAGNOSIS — Z12.4 ENCOUNTER FOR SCREENING FOR CERVICAL CANCER: Primary | ICD-10-CM

## 2019-04-11 DIAGNOSIS — Z01.419 ENCOUNTER FOR ROUTINE GYNECOLOGICAL EXAMINATION: ICD-10-CM

## 2019-04-11 PROCEDURE — 99999 PR PBB SHADOW E&M-EST. PATIENT-LVL III: ICD-10-PCS | Mod: PBBFAC,,, | Performed by: OBSTETRICS & GYNECOLOGY

## 2019-04-11 PROCEDURE — 99395 PREV VISIT EST AGE 18-39: CPT | Mod: S$GLB,,, | Performed by: OBSTETRICS & GYNECOLOGY

## 2019-04-11 PROCEDURE — 99395 PR PREVENTIVE VISIT,EST,18-39: ICD-10-PCS | Mod: S$GLB,,, | Performed by: OBSTETRICS & GYNECOLOGY

## 2019-04-11 PROCEDURE — 88175 CYTOPATH C/V AUTO FLUID REDO: CPT

## 2019-04-11 PROCEDURE — 99999 PR PBB SHADOW E&M-EST. PATIENT-LVL III: CPT | Mod: PBBFAC,,, | Performed by: OBSTETRICS & GYNECOLOGY

## 2019-04-11 RX ORDER — MULTIVITAMIN
2 TABLET ORAL
COMMUNITY

## 2019-04-11 RX ORDER — HYDROCODONE BITARTRATE AND ACETAMINOPHEN 10; 325 MG/1; MG/1
1 TABLET ORAL
COMMUNITY
Start: 2019-03-29 | End: 2019-09-12

## 2019-04-11 RX ORDER — CALCIUM CARBONATE 200(500)MG
1 TABLET,CHEWABLE ORAL
COMMUNITY
End: 2022-06-30

## 2019-04-11 NOTE — PROGRESS NOTES
Subjective:       Patient ID: Rosangela Salazar is a 29 y.o. female.    Chief Complaint:  Well Woman (last pap 2017 normal. c/o pain during intercourse)      Patient Active Problem List   Diagnosis    Mass of knee       History of Present Illness  29 y.o. yo  here for annual exam. 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 Needed chemo, radiation, and surgery with bone graft of part of her femur. Has occasional hot flashes, did discuss freezing eggs, but pt felt too expensive and was ok if she had to adopt. Hot all the time and pain with sex. Had pain with sex even before this but now worse. Explained dryness and rec lubricant. Still healing. Doing ok on OCP. All questions answered      Past Medical History:   Diagnosis Date    Allergy     seasonal    Bone cancer     Gall bladder inflammation     Soft tissue sarcoma        Past Surgical History:   Procedure Laterality Date    COLONOSCOPY  2013    [id number] Left 3/29/2018    Performed by Stephan [facility]icio [facility]    KNEE SURGERY      left femur open biopsy      tumor removal left leg      WISDOM TOOTH EXTRACTION         OB History    Para Term  AB Living   0 0 0 0 0 0   SAB TAB Ectopic Multiple Live Births   0 0 0 0 0   Obstetric Comments   Menarche ~ 12       Patient's last menstrual period was 2019 (approximate).   Date of Last Pap: 2017    Review of Systems  Review of Systems   Constitutional: Positive for fatigue. Negative for unexpected weight change.   Respiratory: Negative for shortness of breath.    Cardiovascular: Positive for palpitations. Negative for chest pain.   Gastrointestinal: Negative for abdominal pain, constipation, diarrhea, nausea and vomiting.   Genitourinary: Negative for dysuria.   Musculoskeletal: Positive for myalgias. Negative for back pain.   Skin: Negative for rash.   Neurological: Negative for headaches.   Hematological: Does not bruise/bleed easily.    Psychiatric/Behavioral: Negative for behavioral problems.        Objective:   Physical Exam:   Constitutional: She is oriented to person, place, and time. Vital signs are normal. She appears well-developed and well-nourished. No distress.        Pulmonary/Chest: She exhibits no mass. Right breast exhibits no mass, no nipple discharge, no skin change, no tenderness, no bleeding and no swelling. Left breast exhibits no mass, no nipple discharge, no skin change, no tenderness, no bleeding and no swelling. Breasts are symmetrical.        Abdominal: Soft. Normal appearance and bowel sounds are normal. She exhibits no distension and no mass. There is no tenderness. There is no rebound.     Genitourinary: Vagina normal and uterus normal. There is no rash, tenderness, lesion or injury on the right labia. There is no rash, tenderness, lesion or injury on the left labia. Uterus is not deviated, not enlarged, not fixed, not tender, not hosting fibroids and not experiencing uterine prolapse. Cervix is normal. Right adnexum displays no mass, no tenderness and no fullness. Left adnexum displays no mass, no tenderness and no fullness. No erythema, tenderness, rectocele, cystocele or unspecified prolapse of vaginal walls in the vagina. No vaginal discharge found. Cervix exhibits no motion tenderness, no discharge and no friability.           Musculoskeletal: Normal range of motion and moves all extremeties.      Lymphadenopathy:     She has no axillary adenopathy.        Right: No supraclavicular adenopathy present.        Left: No supraclavicular adenopathy present.    Neurological: She is alert and oriented to person, place, and time.    Skin: Skin is warm and dry.    Psychiatric: She has a normal mood and affect. Her behavior is normal. Judgment normal.        Assessment/ Plan:     1. Encounter for screening for cervical cancer   Liquid-based pap smear, screening   2. Encounter for gynecological examination (general) (routine)  without abnormal findings     3. Encounter for routine gynecological examination  norgestrel-ethinyl estradiol (CRYSELLE, 28,) 0.3-30 mg-mcg per tablet       Follow-up with me in 1 year

## 2019-09-12 PROBLEM — F41.9 ANXIETY: Status: ACTIVE | Noted: 2019-09-12

## 2019-09-12 PROBLEM — R00.0 TACHYCARDIA: Status: ACTIVE | Noted: 2019-09-12

## 2019-09-12 PROBLEM — C49.9 SARCOMA: Status: ACTIVE | Noted: 2019-09-12

## 2019-09-12 PROBLEM — R22.40 MASS OF KNEE: Status: RESOLVED | Noted: 2018-03-29 | Resolved: 2019-09-12

## 2019-11-05 PROBLEM — R09.82 POSTNASAL DRIP: Status: ACTIVE | Noted: 2019-11-05

## 2019-11-05 PROBLEM — K21.9 GASTROESOPHAGEAL REFLUX DISEASE WITHOUT ESOPHAGITIS: Status: ACTIVE | Noted: 2019-11-05

## 2020-03-05 ENCOUNTER — PATIENT MESSAGE (OUTPATIENT)
Dept: OBSTETRICS AND GYNECOLOGY | Facility: CLINIC | Age: 30
End: 2020-03-05

## 2020-05-13 ENCOUNTER — OFFICE VISIT (OUTPATIENT)
Dept: OBSTETRICS AND GYNECOLOGY | Facility: CLINIC | Age: 30
End: 2020-05-13
Attending: OBSTETRICS & GYNECOLOGY
Payer: COMMERCIAL

## 2020-05-13 VITALS — HEIGHT: 67 IN | BODY MASS INDEX: 41.04 KG/M2

## 2020-05-13 DIAGNOSIS — Z01.419 ENCOUNTER FOR GYNECOLOGICAL EXAMINATION (GENERAL) (ROUTINE) WITHOUT ABNORMAL FINDINGS: ICD-10-CM

## 2020-05-13 DIAGNOSIS — Z11.51 ENCOUNTER FOR SCREENING FOR HUMAN PAPILLOMAVIRUS (HPV): ICD-10-CM

## 2020-05-13 DIAGNOSIS — Z01.419 ENCOUNTER FOR ROUTINE GYNECOLOGICAL EXAMINATION: ICD-10-CM

## 2020-05-13 DIAGNOSIS — Z12.4 ENCOUNTER FOR PAPANICOLAOU SMEAR FOR CERVICAL CANCER SCREENING: Primary | ICD-10-CM

## 2020-05-13 PROCEDURE — 99395 PREV VISIT EST AGE 18-39: CPT | Mod: S$GLB,,, | Performed by: OBSTETRICS & GYNECOLOGY

## 2020-05-13 PROCEDURE — 88175 CYTOPATH C/V AUTO FLUID REDO: CPT

## 2020-05-13 PROCEDURE — 87624 HPV HI-RISK TYP POOLED RSLT: CPT

## 2020-05-13 PROCEDURE — 99395 PR PREVENTIVE VISIT,EST,18-39: ICD-10-PCS | Mod: S$GLB,,, | Performed by: OBSTETRICS & GYNECOLOGY

## 2020-05-13 PROCEDURE — 99999 PR PBB SHADOW E&M-EST. PATIENT-LVL III: ICD-10-PCS | Mod: PBBFAC,,, | Performed by: OBSTETRICS & GYNECOLOGY

## 2020-05-13 PROCEDURE — 99999 PR PBB SHADOW E&M-EST. PATIENT-LVL III: CPT | Mod: PBBFAC,,, | Performed by: OBSTETRICS & GYNECOLOGY

## 2020-05-13 NOTE — PROGRESS NOTES
Subjective:       Patient ID: Rosangela Salazar is a 30 y.o. female.    Chief Complaint:  Annual Exam (last pap  normal ) and Metrorrhagia (c/o spotting for 1 week before period)      Patient Active Problem List   Diagnosis    Sarcoma    Anxiety    Tachycardia    Gastroesophageal reflux disease without esophagitis    Postnasal drip       History of Present Illness  30 y.o. yo  here for annual exam. 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 Needed chemo, radiation, and surgery with bone graft of part of her femur. Still healing. IN crutches today. Doing ok on OCP. Occasionally has BTB on third week of pill pack. Worried about ovarian cancer because one doctor, not at [facility], said since she had one sarcoma she was more likely to get ovarian cancer. I explained that I did not think that was true and to discuss with oncologist. I also explained that OCP are protective of ovarian cancer. All questions answered     I explained new pap and HPV guidelines. Will do pap and HPV test today. Will repeat pap and HPV every 3 years. Answered all questions. Patient agrees.         Past Medical History:   Diagnosis Date    Allergy     seasonal    Bone cancer     Gall bladder inflammation     Mass of knee 3/29/2018    Soft tissue sarcoma     Tachycardia        Past Surgical History:   Procedure Laterality Date    BONE GRAFT Left 2018    femur     COLONOSCOPY  2013    KNEE SURGERY      left femur open biopsy      tumor removal left leg      WISDOM TOOTH EXTRACTION         OB History    Para Term  AB Living   0 0 0 0 0 0   SAB TAB Ectopic Multiple Live Births   0 0 0 0 0   Obstetric Comments   Menarche ~ 12       Patient's last menstrual period was 2020 (approximate).   Date of Last Pap: 2019    Review of Systems  Review of Systems   Constitutional: Negative for unexpected weight change.   Respiratory: Negative for shortness of breath.    Cardiovascular: Negative for  chest pain.   Gastrointestinal: Negative for abdominal pain, constipation, diarrhea, nausea and vomiting.   Genitourinary: Negative for dysuria.   Musculoskeletal: Negative for back pain.   Skin: Negative for rash.   Neurological: Negative for headaches.   Hematological: Does not bruise/bleed easily.   Psychiatric/Behavioral: Negative for behavioral problems.        Objective:   Physical Exam:   Constitutional: She is oriented to person, place, and time. Vital signs are normal. She appears well-developed and well-nourished. No distress.        Pulmonary/Chest: She exhibits no mass. Right breast exhibits no mass, no nipple discharge, no skin change, no tenderness, no bleeding and no swelling. Left breast exhibits no mass, no nipple discharge, no skin change, no tenderness, no bleeding and no swelling. Breasts are symmetrical.        Abdominal: Soft. Normal appearance and bowel sounds are normal. She exhibits no distension and no mass. There is no tenderness. There is no rebound.     Genitourinary: Vagina normal and uterus normal. There is no rash, tenderness, lesion or injury on the right labia. There is no rash, tenderness, lesion or injury on the left labia. Uterus is not deviated, not enlarged, not fixed, not tender, not hosting fibroids and not experiencing uterine prolapse. Cervix is normal. Right adnexum displays no mass, no tenderness and no fullness. Left adnexum displays no mass, no tenderness and no fullness. No erythema, tenderness, rectocele, cystocele or unspecified prolapse of vaginal walls in the vagina. No vaginal discharge found. Cervix exhibits no motion tenderness, no discharge and no friability.           Musculoskeletal: Normal range of motion and moves all extremeties.      Lymphadenopathy:     She has no axillary adenopathy.        Right: No supraclavicular adenopathy present.        Left: No supraclavicular adenopathy present.    Neurological: She is alert and oriented to person, place, and  time.    Skin: Skin is warm and dry.    Psychiatric: She has a normal mood and affect. Her behavior is normal. Judgment normal.        Assessment/ Plan:     1. Encounter for Papanicolaou smear for cervical cancer screening  Liquid-Based Pap Smear, Screening   2. Encounter for routine gynecological examination  norgestrel-ethinyl estradioL (CRYSELLE, Christin,) 0.3-30 mg-mcg per tablet   3. Encounter for screening for human papillomavirus (HPV)  HPV High Risk Genotypes, PCR       Follow-up with me in 1 year

## 2020-05-15 LAB
FINAL PATHOLOGIC DIAGNOSIS: NORMAL
Lab: NORMAL

## 2020-05-21 LAB
HPV HR 12 DNA SPEC QL NAA+PROBE: NEGATIVE
HPV16 AG SPEC QL: NEGATIVE
HPV18 DNA SPEC QL NAA+PROBE: NEGATIVE

## 2020-12-08 NOTE — PROGRESS NOTES
HISTORY OF PRESENT ILLNESS:  Rosangela returns for evaluation of her left leg.  She   is approximately 10 days status post open biopsy of her left thigh lesion.  She   noted some increased drainage recently.    PHYSICAL EXAMINATION:  Her dressing was removed.  She had some remote old bloody   drainage on the bandage, but there is no active bleeding.  There is no warmth   or erythema.    A new compressive dressing was applied.  I am going to place her in a knee   immobilizer for a couple of days to let this calm down.  She can ambulate in her   knee immobilizer.  Pathology is still not back.  This was sent to the [facility] for confirmatory opinion.      MSM/HN  dd: 04/09/2018 10:33:32 (CDT)  td: 04/10/2018 04:09:25 (CDT)  Doc ID   #[id number]  Job ID #[id number]    CC:     [id number]  
27-Dec-2018 10:17
Gabapentin Counseling: I discussed with the patient the risks of gabapentin including but not limited to dizziness, somnolence, fatigue and ataxia.

## 2021-05-23 DIAGNOSIS — Z01.419 ENCOUNTER FOR ROUTINE GYNECOLOGICAL EXAMINATION: ICD-10-CM

## 2021-05-23 RX ORDER — NORGESTREL AND ETHINYL ESTRADIOL 0.3-0.03MG
KIT ORAL
Qty: 84 TABLET | Refills: 0 | Status: SHIPPED | OUTPATIENT
Start: 2021-05-23 | End: 2021-05-27 | Stop reason: SDUPTHER

## 2021-05-27 ENCOUNTER — OFFICE VISIT (OUTPATIENT)
Dept: OBSTETRICS AND GYNECOLOGY | Facility: CLINIC | Age: 31
End: 2021-05-27
Attending: OBSTETRICS & GYNECOLOGY
Payer: COMMERCIAL

## 2021-05-27 VITALS
BODY MASS INDEX: 38.58 KG/M2 | SYSTOLIC BLOOD PRESSURE: 126 MMHG | WEIGHT: 245.81 LBS | DIASTOLIC BLOOD PRESSURE: 86 MMHG | HEIGHT: 67 IN

## 2021-05-27 DIAGNOSIS — Z01.419 ENCOUNTER FOR GYNECOLOGICAL EXAMINATION (GENERAL) (ROUTINE) WITHOUT ABNORMAL FINDINGS: Primary | ICD-10-CM

## 2021-05-27 DIAGNOSIS — Z01.419 ENCOUNTER FOR GYNECOLOGICAL EXAMINATION WITHOUT ABNORMAL FINDING: ICD-10-CM

## 2021-05-27 PROCEDURE — 3008F PR BODY MASS INDEX (BMI) DOCUMENTED: ICD-10-PCS | Mod: CPTII,S$GLB,, | Performed by: OBSTETRICS & GYNECOLOGY

## 2021-05-27 PROCEDURE — 99395 PREV VISIT EST AGE 18-39: CPT | Mod: S$GLB,,, | Performed by: OBSTETRICS & GYNECOLOGY

## 2021-05-27 PROCEDURE — 99999 PR PBB SHADOW E&M-EST. PATIENT-LVL III: ICD-10-PCS | Mod: PBBFAC,,, | Performed by: OBSTETRICS & GYNECOLOGY

## 2021-05-27 PROCEDURE — 1126F AMNT PAIN NOTED NONE PRSNT: CPT | Mod: S$GLB,,, | Performed by: OBSTETRICS & GYNECOLOGY

## 2021-05-27 PROCEDURE — 99999 PR PBB SHADOW E&M-EST. PATIENT-LVL III: CPT | Mod: PBBFAC,,, | Performed by: OBSTETRICS & GYNECOLOGY

## 2021-05-27 PROCEDURE — 3008F BODY MASS INDEX DOCD: CPT | Mod: CPTII,S$GLB,, | Performed by: OBSTETRICS & GYNECOLOGY

## 2021-05-27 PROCEDURE — 99395 PR PREVENTIVE VISIT,EST,18-39: ICD-10-PCS | Mod: S$GLB,,, | Performed by: OBSTETRICS & GYNECOLOGY

## 2021-05-27 PROCEDURE — 1126F PR PAIN SEVERITY QUANTIFIED, NO PAIN PRESENT: ICD-10-PCS | Mod: S$GLB,,, | Performed by: OBSTETRICS & GYNECOLOGY

## 2021-05-27 RX ORDER — NORGESTREL AND ETHINYL ESTRADIOL 0.3-0.03MG
1 KIT ORAL DAILY
Qty: 84 TABLET | Refills: 3 | Status: SHIPPED | OUTPATIENT
Start: 2021-05-27 | End: 2022-06-30 | Stop reason: SDUPTHER

## 2021-11-18 ENCOUNTER — PATIENT MESSAGE (OUTPATIENT)
Dept: OBSTETRICS AND GYNECOLOGY | Facility: CLINIC | Age: 31
End: 2021-11-18

## 2021-11-18 ENCOUNTER — OFFICE VISIT (OUTPATIENT)
Dept: OBSTETRICS AND GYNECOLOGY | Facility: CLINIC | Age: 31
End: 2021-11-18
Attending: OBSTETRICS & GYNECOLOGY
Payer: COMMERCIAL

## 2021-11-18 DIAGNOSIS — R00.0 TACHYCARDIA: Primary | ICD-10-CM

## 2021-11-18 PROCEDURE — 1160F RVW MEDS BY RX/DR IN RCRD: CPT | Mod: CPTII,95,, | Performed by: OBSTETRICS & GYNECOLOGY

## 2021-11-18 PROCEDURE — 99212 PR OFFICE/OUTPT VISIT, EST, LEVL II, 10-19 MIN: ICD-10-PCS | Mod: 95,,, | Performed by: OBSTETRICS & GYNECOLOGY

## 2021-11-18 PROCEDURE — 1159F MED LIST DOCD IN RCRD: CPT | Mod: CPTII,95,, | Performed by: OBSTETRICS & GYNECOLOGY

## 2021-11-18 PROCEDURE — 1159F PR MEDICATION LIST DOCUMENTED IN MEDICAL RECORD: ICD-10-PCS | Mod: CPTII,95,, | Performed by: OBSTETRICS & GYNECOLOGY

## 2021-11-18 PROCEDURE — 1160F PR REVIEW ALL MEDS BY PRESCRIBER/CLIN PHARMACIST DOCUMENTED: ICD-10-PCS | Mod: CPTII,95,, | Performed by: OBSTETRICS & GYNECOLOGY

## 2021-11-18 PROCEDURE — 99212 OFFICE O/P EST SF 10 MIN: CPT | Mod: 95,,, | Performed by: OBSTETRICS & GYNECOLOGY

## 2022-05-31 ENCOUNTER — OFFICE VISIT (OUTPATIENT)
Dept: URGENT CARE | Facility: CLINIC | Age: 32
End: 2022-05-31
Payer: COMMERCIAL

## 2022-05-31 VITALS
BODY MASS INDEX: 37.2 KG/M2 | DIASTOLIC BLOOD PRESSURE: 93 MMHG | OXYGEN SATURATION: 98 % | WEIGHT: 237 LBS | HEIGHT: 67 IN | TEMPERATURE: 97 F | HEART RATE: 80 BPM | RESPIRATION RATE: 16 BRPM | SYSTOLIC BLOOD PRESSURE: 138 MMHG

## 2022-05-31 DIAGNOSIS — L30.9 DERMATITIS: Primary | ICD-10-CM

## 2022-05-31 PROCEDURE — 1160F PR REVIEW ALL MEDS BY PRESCRIBER/CLIN PHARMACIST DOCUMENTED: ICD-10-PCS | Mod: CPTII,S$GLB,, | Performed by: EMERGENCY MEDICINE

## 2022-05-31 PROCEDURE — 99213 PR OFFICE/OUTPT VISIT, EST, LEVL III, 20-29 MIN: ICD-10-PCS | Mod: S$GLB,,, | Performed by: EMERGENCY MEDICINE

## 2022-05-31 PROCEDURE — 1159F MED LIST DOCD IN RCRD: CPT | Mod: CPTII,S$GLB,, | Performed by: EMERGENCY MEDICINE

## 2022-05-31 PROCEDURE — 3075F SYST BP GE 130 - 139MM HG: CPT | Mod: CPTII,S$GLB,, | Performed by: EMERGENCY MEDICINE

## 2022-05-31 PROCEDURE — 1160F RVW MEDS BY RX/DR IN RCRD: CPT | Mod: CPTII,S$GLB,, | Performed by: EMERGENCY MEDICINE

## 2022-05-31 PROCEDURE — 3008F BODY MASS INDEX DOCD: CPT | Mod: CPTII,S$GLB,, | Performed by: EMERGENCY MEDICINE

## 2022-05-31 PROCEDURE — 3075F PR MOST RECENT SYSTOLIC BLOOD PRESS GE 130-139MM HG: ICD-10-PCS | Mod: CPTII,S$GLB,, | Performed by: EMERGENCY MEDICINE

## 2022-05-31 PROCEDURE — 99213 OFFICE O/P EST LOW 20 MIN: CPT | Mod: S$GLB,,, | Performed by: EMERGENCY MEDICINE

## 2022-05-31 PROCEDURE — 1159F PR MEDICATION LIST DOCUMENTED IN MEDICAL RECORD: ICD-10-PCS | Mod: CPTII,S$GLB,, | Performed by: EMERGENCY MEDICINE

## 2022-05-31 PROCEDURE — 3080F DIAST BP >= 90 MM HG: CPT | Mod: CPTII,S$GLB,, | Performed by: EMERGENCY MEDICINE

## 2022-05-31 PROCEDURE — 3080F PR MOST RECENT DIASTOLIC BLOOD PRESSURE >= 90 MM HG: ICD-10-PCS | Mod: CPTII,S$GLB,, | Performed by: EMERGENCY MEDICINE

## 2022-05-31 PROCEDURE — 3008F PR BODY MASS INDEX (BMI) DOCUMENTED: ICD-10-PCS | Mod: CPTII,S$GLB,, | Performed by: EMERGENCY MEDICINE

## 2022-05-31 RX ORDER — MUPIROCIN 20 MG/G
OINTMENT TOPICAL 3 TIMES DAILY
Qty: 22 G | Refills: 0 | Status: SHIPPED | OUTPATIENT
Start: 2022-05-31 | End: 2022-06-30

## 2022-05-31 RX ORDER — TRIAMCINOLONE ACETONIDE 1 MG/G
CREAM TOPICAL 2 TIMES DAILY
Qty: 15 G | Refills: 0 | Status: SHIPPED | OUTPATIENT
Start: 2022-05-31 | End: 2022-06-30

## 2022-05-31 RX ORDER — METOPROLOL TARTRATE 25 MG/1
25 TABLET, FILM COATED ORAL 2 TIMES DAILY
COMMUNITY
Start: 2022-05-22

## 2022-05-31 NOTE — PROGRESS NOTES
"Subjective:       Patient ID: Rosangela Salazar is a 32 y.o. female.    Vitals:  height is 5' 7" (1.702 m) and weight is 107.5 kg (237 lb). Her oral temperature is 97.2 °F (36.2 °C). Her blood pressure is 138/93 (abnormal) and her pulse is 80. Her respiration is 16 and oxygen saturation is 98%.     Chief Complaint: Other Misc (I had a DTAP shot Sunday and now I have a red allen. It's sore and painful to the touch. - Entered by patient) and immunization reaction    Pt presents today reporting a red spot on her left arm that is sore and painful to the touch from a Tdap vaccine received on Sunday. The area is slightly red and raised, and pt states that the spot is growing larger. She also has a small itchy spot on her left ankle that appeared after working in the yard on Saturday. She states that this area hasn't changed since then. She has applied cortisone to this area with no relief.     Rash  This is a new problem. The current episode started in the past 7 days. The problem has been gradually worsening since onset. The affected locations include the left arm and left ankle. The rash is characterized by redness, swelling, pain and itchiness. Associated with: Immunization. Pertinent negatives include no fever or shortness of breath. Past treatments include antibiotic cream. The treatment provided no relief.       Constitution: Negative for fever.   Respiratory: Negative for shortness of breath.    Skin: Positive for rash.       Objective:      Physical Exam   Constitutional: She is oriented to person, place, and time. She appears well-developed.   HENT:   Head: Normocephalic and atraumatic. Head is without abrasion, without contusion and without laceration.   Ears:   Right Ear: External ear normal.   Left Ear: External ear normal.   Nose: Nose normal.   Mouth/Throat: Oropharynx is clear and moist and mucous membranes are normal.   Eyes: Conjunctivae, EOM and lids are normal.   Neck: Trachea normal and phonation normal. Neck " supple.   Cardiovascular: Normal rate, regular rhythm and normal heart sounds.   Pulmonary/Chest: Effort normal and breath sounds normal. No stridor. No respiratory distress.   Musculoskeletal: Normal range of motion.         General: Normal range of motion.   Neurological: She is alert and oriented to person, place, and time.   Skin: Skin is warm, dry and intact. Capillary refill takes less than 2 seconds. No abrasion, No burn, No bruising and No ecchymosis         Comments: Left Deltoid muscle has minimal induration and minimal faint erythema consistent with immune response from recent Tdap injection.  Right lower shin has 0.5 by 1.5 cm eczemoid area   Psychiatric: Her speech is normal and behavior is normal. Judgment and thought content normal.   Nursing note and vitals reviewed.    Left deltoid area has expected immune response from Tdap.  The right lower extremity area appears to be either a contact dermatitis or early infection from either bug bite or shaving.  Will cover with both topical steroid and topical mupirocin.        Assessment:       1. Dermatitis          Plan:         Dermatitis  -     mupirocin (BACTROBAN) 2 % ointment; Apply topically 3 (three) times daily.  Dispense: 22 g; Refill: 0  -     triamcinolone acetonide 0.1% (KENALOG) 0.1 % cream; Apply topically 2 (two) times daily.  Dispense: 15 g; Refill: 0

## 2022-06-30 ENCOUNTER — OFFICE VISIT (OUTPATIENT)
Dept: OBSTETRICS AND GYNECOLOGY | Facility: CLINIC | Age: 32
End: 2022-06-30
Attending: OBSTETRICS & GYNECOLOGY
Payer: COMMERCIAL

## 2022-06-30 VITALS
DIASTOLIC BLOOD PRESSURE: 96 MMHG | SYSTOLIC BLOOD PRESSURE: 128 MMHG | WEIGHT: 241.06 LBS | HEIGHT: 67 IN | BODY MASS INDEX: 37.83 KG/M2

## 2022-06-30 DIAGNOSIS — Z01.419 ENCOUNTER FOR GYNECOLOGICAL EXAMINATION WITHOUT ABNORMAL FINDING: ICD-10-CM

## 2022-06-30 DIAGNOSIS — Z01.419 ENCOUNTER FOR GYNECOLOGICAL EXAMINATION (GENERAL) (ROUTINE) WITHOUT ABNORMAL FINDINGS: Primary | ICD-10-CM

## 2022-06-30 PROCEDURE — 99395 PR PREVENTIVE VISIT,EST,18-39: ICD-10-PCS | Mod: S$GLB,,, | Performed by: OBSTETRICS & GYNECOLOGY

## 2022-06-30 PROCEDURE — 3074F SYST BP LT 130 MM HG: CPT | Mod: CPTII,S$GLB,, | Performed by: OBSTETRICS & GYNECOLOGY

## 2022-06-30 PROCEDURE — 99395 PREV VISIT EST AGE 18-39: CPT | Mod: S$GLB,,, | Performed by: OBSTETRICS & GYNECOLOGY

## 2022-06-30 PROCEDURE — 99999 PR PBB SHADOW E&M-EST. PATIENT-LVL III: CPT | Mod: PBBFAC,,, | Performed by: OBSTETRICS & GYNECOLOGY

## 2022-06-30 PROCEDURE — 3008F BODY MASS INDEX DOCD: CPT | Mod: CPTII,S$GLB,, | Performed by: OBSTETRICS & GYNECOLOGY

## 2022-06-30 PROCEDURE — 1159F MED LIST DOCD IN RCRD: CPT | Mod: CPTII,S$GLB,, | Performed by: OBSTETRICS & GYNECOLOGY

## 2022-06-30 PROCEDURE — 1159F PR MEDICATION LIST DOCUMENTED IN MEDICAL RECORD: ICD-10-PCS | Mod: CPTII,S$GLB,, | Performed by: OBSTETRICS & GYNECOLOGY

## 2022-06-30 PROCEDURE — 3008F PR BODY MASS INDEX (BMI) DOCUMENTED: ICD-10-PCS | Mod: CPTII,S$GLB,, | Performed by: OBSTETRICS & GYNECOLOGY

## 2022-06-30 PROCEDURE — 3080F PR MOST RECENT DIASTOLIC BLOOD PRESSURE >= 90 MM HG: ICD-10-PCS | Mod: CPTII,S$GLB,, | Performed by: OBSTETRICS & GYNECOLOGY

## 2022-06-30 PROCEDURE — 3074F PR MOST RECENT SYSTOLIC BLOOD PRESSURE < 130 MM HG: ICD-10-PCS | Mod: CPTII,S$GLB,, | Performed by: OBSTETRICS & GYNECOLOGY

## 2022-06-30 PROCEDURE — 99999 PR PBB SHADOW E&M-EST. PATIENT-LVL III: ICD-10-PCS | Mod: PBBFAC,,, | Performed by: OBSTETRICS & GYNECOLOGY

## 2022-06-30 PROCEDURE — 3080F DIAST BP >= 90 MM HG: CPT | Mod: CPTII,S$GLB,, | Performed by: OBSTETRICS & GYNECOLOGY

## 2022-06-30 RX ORDER — NORGESTREL AND ETHINYL ESTRADIOL 0.3-0.03MG
1 KIT ORAL DAILY
Qty: 84 TABLET | Refills: 3 | Status: SHIPPED | OUTPATIENT
Start: 2022-06-30 | End: 2022-09-09 | Stop reason: SDUPTHER

## 2022-06-30 NOTE — PROGRESS NOTES
Subjective:       Patient ID: Rosangela Salazar is a 32 y.o. female.    Chief Complaint:  Annual Exam (Last pap/hpv  normal)        History of Present Illness  Rosangela Salazar is a 32 y.o. female  who presents for annual. Overall doing well. Discussed blood pressure and OCP in detail. Has appt with cardiology in September and will discuss with them. BP higher on placebo week. Menopausal symptoms still present since cancer treatment. Discussed. Anxiety about other cancers particularly ovarian cancer due to FHx and was told it may be linked to her sarcoma.     Patient's last menstrual period was 2022 (approximate).   Date of Last Pap: 5/15/2020    Review of Systems  Review of Systems   Constitutional: Negative for chills and fever.        Objective:   Physical Exam:   Constitutional: She is oriented to person, place, and time. Vital signs are normal. She appears well-developed and well-nourished. No distress.        Pulmonary/Chest: She exhibits no mass. Right breast exhibits no mass, no nipple discharge, no skin change, no tenderness, no bleeding and no swelling. Left breast exhibits no mass, no nipple discharge, no skin change, no tenderness, no bleeding and no swelling. Breasts are symmetrical.        Abdominal: Soft. Bowel sounds are normal. She exhibits no distension and no mass. There is no abdominal tenderness. There is no rebound.     Genitourinary:    Vagina and uterus normal.   There is no rash, tenderness, lesion or injury on the right labia. There is no rash, tenderness, lesion or injury on the left labia. Cervix is normal. Right adnexum displays no mass, no tenderness and no fullness. Left adnexum displays no mass, no tenderness and no fullness. No erythema,  no vaginal discharge, tenderness, rectocele, cystocele or unspecified prolapse of vaginal walls in the vagina. Cervix exhibits no motion tenderness, no discharge and no friability. Uterus is not deviated, not enlarged, not fixed, not tender  and not hosting fibroids.           Musculoskeletal: Normal range of motion and moves all extremeties.      Lymphadenopathy:        Right: No supraclavicular adenopathy present.        Left: No supraclavicular adenopathy present.    Neurological: She is alert and oriented to person, place, and time.    Skin: Skin is warm and dry.    Psychiatric: She has a normal mood and affect. Her behavior is normal. Judgment normal.        Assessment/ Plan:     1. Encounter for gynecological examination (general) (routine) without abnormal findings     2. Encounter for gynecological examination without abnormal finding  norgestrel-ethinyl estradioL (CRYSELLE, 28,) 0.3-30 mg-mcg per tablet       Follow up in about 1 year (around 6/30/2023) for Annual exam.    As of April 1, 2021, the Cures Act has been passed nationally. This new law requires that all doctors progress notes, lab results, pathology reports and radiology reports be released IMMEDIATELY to the patient in the patient portal. That means that the results are released to you at the EXACT same time they are released to me. Therefore, with all of the patients that I have I am not able to reply to each patient exactly when the results come in. So there will be a delay from when you see the results to when I see them and have time to come up with a response to send you. Also I only see these results when I am on the computer at work. So if the results come in over the weekend or after 5 pm of a work day, I will not see them until the next business day. As you can tell, this is a challenge as a physician to give every patient the quick response they hope for and deserve. So please be patient! Thanks for understanding, Dr. Pereyra

## 2022-09-09 ENCOUNTER — PATIENT MESSAGE (OUTPATIENT)
Dept: OBSTETRICS AND GYNECOLOGY | Facility: CLINIC | Age: 32
End: 2022-09-09
Payer: COMMERCIAL

## 2022-09-09 DIAGNOSIS — Z01.419 ENCOUNTER FOR GYNECOLOGICAL EXAMINATION WITHOUT ABNORMAL FINDING: ICD-10-CM

## 2022-09-09 RX ORDER — NORGESTREL AND ETHINYL ESTRADIOL 0.3-0.03MG
1 KIT ORAL DAILY
Qty: 112 TABLET | Refills: 3 | Status: SHIPPED | OUTPATIENT
Start: 2022-09-09 | End: 2022-12-07

## 2022-12-07 ENCOUNTER — PATIENT MESSAGE (OUTPATIENT)
Dept: OBSTETRICS AND GYNECOLOGY | Facility: CLINIC | Age: 32
End: 2022-12-07
Payer: COMMERCIAL

## 2022-12-07 RX ORDER — NORGESTIMATE AND ETHINYL ESTRADIOL 0.25-0.035
1 KIT ORAL DAILY
Qty: 112 TABLET | Refills: 3 | Status: SHIPPED | OUTPATIENT
Start: 2022-12-07 | End: 2023-05-10

## 2023-01-20 ENCOUNTER — PATIENT MESSAGE (OUTPATIENT)
Dept: OBSTETRICS AND GYNECOLOGY | Facility: CLINIC | Age: 33
End: 2023-01-20
Payer: COMMERCIAL

## 2023-04-14 ENCOUNTER — PATIENT MESSAGE (OUTPATIENT)
Dept: OBSTETRICS AND GYNECOLOGY | Facility: CLINIC | Age: 33
End: 2023-04-14
Payer: COMMERCIAL

## 2023-04-14 DIAGNOSIS — N93.9 ABNORMAL UTERINE BLEEDING (AUB): Primary | ICD-10-CM

## 2023-04-14 NOTE — TELEPHONE ENCOUNTER
Pt has been taking continuous OCP since Dec. Skipping periods due to BP issues.  C/o BTB and period symptoms until she allows a period.  Reports since Jan, her symptoms have been more painful and heavier than when she was on the previous OCP.  Inquiring if she should switch to a completely different OCP or go back to previous one.    Would like to wait for Dr. Pereyra's response when she is back.

## 2023-04-17 NOTE — TELEPHONE ENCOUNTER
I think we should probably do an ultrasound. We have not done one before. And the reason I switched her to this pill was because she was having some BTB on the other pill, cryselle. So see if she can come for a visit. I just opened May 10 for add ons. See if she can do that with an u.s and appt with me

## 2023-05-10 ENCOUNTER — OFFICE VISIT (OUTPATIENT)
Dept: OBSTETRICS AND GYNECOLOGY | Facility: CLINIC | Age: 33
End: 2023-05-10
Attending: OBSTETRICS & GYNECOLOGY
Payer: COMMERCIAL

## 2023-05-10 VITALS
BODY MASS INDEX: 39.55 KG/M2 | DIASTOLIC BLOOD PRESSURE: 90 MMHG | WEIGHT: 252 LBS | SYSTOLIC BLOOD PRESSURE: 128 MMHG | HEIGHT: 67 IN

## 2023-05-10 DIAGNOSIS — N92.0 MENORRHAGIA WITH REGULAR CYCLE: Primary | ICD-10-CM

## 2023-05-10 PROCEDURE — 3074F PR MOST RECENT SYSTOLIC BLOOD PRESSURE < 130 MM HG: ICD-10-PCS | Mod: CPTII,S$GLB,, | Performed by: OBSTETRICS & GYNECOLOGY

## 2023-05-10 PROCEDURE — 3080F DIAST BP >= 90 MM HG: CPT | Mod: CPTII,S$GLB,, | Performed by: OBSTETRICS & GYNECOLOGY

## 2023-05-10 PROCEDURE — 3074F SYST BP LT 130 MM HG: CPT | Mod: CPTII,S$GLB,, | Performed by: OBSTETRICS & GYNECOLOGY

## 2023-05-10 PROCEDURE — 3008F PR BODY MASS INDEX (BMI) DOCUMENTED: ICD-10-PCS | Mod: CPTII,S$GLB,, | Performed by: OBSTETRICS & GYNECOLOGY

## 2023-05-10 PROCEDURE — 1160F PR REVIEW ALL MEDS BY PRESCRIBER/CLIN PHARMACIST DOCUMENTED: ICD-10-PCS | Mod: CPTII,S$GLB,, | Performed by: OBSTETRICS & GYNECOLOGY

## 2023-05-10 PROCEDURE — 1160F RVW MEDS BY RX/DR IN RCRD: CPT | Mod: CPTII,S$GLB,, | Performed by: OBSTETRICS & GYNECOLOGY

## 2023-05-10 PROCEDURE — 1159F PR MEDICATION LIST DOCUMENTED IN MEDICAL RECORD: ICD-10-PCS | Mod: CPTII,S$GLB,, | Performed by: OBSTETRICS & GYNECOLOGY

## 2023-05-10 PROCEDURE — 99999 PR PBB SHADOW E&M-EST. PATIENT-LVL III: CPT | Mod: PBBFAC,,, | Performed by: OBSTETRICS & GYNECOLOGY

## 2023-05-10 PROCEDURE — 99214 PR OFFICE/OUTPT VISIT, EST, LEVL IV, 30-39 MIN: ICD-10-PCS | Mod: S$GLB,,, | Performed by: OBSTETRICS & GYNECOLOGY

## 2023-05-10 PROCEDURE — 3080F PR MOST RECENT DIASTOLIC BLOOD PRESSURE >= 90 MM HG: ICD-10-PCS | Mod: CPTII,S$GLB,, | Performed by: OBSTETRICS & GYNECOLOGY

## 2023-05-10 PROCEDURE — 99999 PR PBB SHADOW E&M-EST. PATIENT-LVL III: ICD-10-PCS | Mod: PBBFAC,,, | Performed by: OBSTETRICS & GYNECOLOGY

## 2023-05-10 PROCEDURE — 99214 OFFICE O/P EST MOD 30 MIN: CPT | Mod: S$GLB,,, | Performed by: OBSTETRICS & GYNECOLOGY

## 2023-05-10 PROCEDURE — 1159F MED LIST DOCD IN RCRD: CPT | Mod: CPTII,S$GLB,, | Performed by: OBSTETRICS & GYNECOLOGY

## 2023-05-10 PROCEDURE — 3008F BODY MASS INDEX DOCD: CPT | Mod: CPTII,S$GLB,, | Performed by: OBSTETRICS & GYNECOLOGY

## 2023-05-10 NOTE — PROGRESS NOTES
Subjective:       Patient ID: Rosangela Salazar is a 33 y.o. female.    Chief Complaint:  Menorrhagia        History of Present Illness  Rosangela Salazar is a 33 y.o. female  who presents for evaluation of heavy periods despite OCP. She was on Cryselle for years and did well on that. We changed due to some BTB when trying to do continuously. We tried continuous because she felt that her BP was higher during placebo week. She was not able to do because of BTB so I changed her from 30 to 35 mcg pill. On this new pill had significant breast tenderness which is now better but still with BTB and periods are much heavier when she does have them. I recommended visit with u/s. Done today which is normal. After discussion of options would like to go back to Cryselle and will just take normally and not continuously. There was also some speculation of her being in early menopause after her chemo treatments. She is now on OCP so would be hard to tell. Unsure if wants children. Discussed. May consider adoption. I recommend discuss with oncologist. Last chemo almost 5 years ago. Also, FHx of ovarian cancer in 2 aunts. Reassurance u/s normal today. She is unsure of the BrCA status of her aunts.     Patient's last menstrual period was 2023 (exact date).   Date of Last Pap: 5/15/2020    Review of Systems  Review of Systems   Constitutional:  Negative for chills and fever.   Genitourinary:  Positive for menstrual problem.      Objective:   Physical Exam:   Constitutional: She appears well-developed and well-nourished.                                Assessment/ Plan:     1. Menorrhagia with regular cycle  norgestrel-ethinyl estradioL (LO/OVRAL) 0.3-30 mg-mcg per tablet          No follow-ups on file.    As of 2021, the Cures Act has been passed nationally. This new law requires that all doctors progress notes, lab results, pathology reports and radiology reports be released IMMEDIATELY to the patient in the patient portal.  That means that the results are released to you at the EXACT same time they are released to me. Therefore, with all of the patients that I have I am not able to reply to each patient exactly when the results come in. So there will be a delay from when you see the results to when I see them and have time to come up with a response to send you. Also I only see these results when I am on the computer at work. So if the results come in over the weekend or after 5 pm of a work day, I will not see them until the next business day. As you can tell, this is a challenge as a physician to give every patient the quick response they hope for and deserve. So please be patient! Thanks for understanding, Dr. Pereyra

## 2023-07-18 ENCOUNTER — PATIENT MESSAGE (OUTPATIENT)
Dept: OBSTETRICS AND GYNECOLOGY | Facility: CLINIC | Age: 33
End: 2023-07-18
Payer: COMMERCIAL

## 2023-07-18 DIAGNOSIS — N92.0 MENORRHAGIA WITH REGULAR CYCLE: ICD-10-CM

## 2023-08-03 ENCOUNTER — OFFICE VISIT (OUTPATIENT)
Dept: OTOLARYNGOLOGY | Facility: CLINIC | Age: 33
End: 2023-08-03
Payer: COMMERCIAL

## 2023-08-03 VITALS
BODY MASS INDEX: 39.42 KG/M2 | SYSTOLIC BLOOD PRESSURE: 140 MMHG | HEIGHT: 67 IN | WEIGHT: 251.13 LBS | DIASTOLIC BLOOD PRESSURE: 87 MMHG

## 2023-08-03 DIAGNOSIS — J30.2 SEASONAL ALLERGIES: Primary | ICD-10-CM

## 2023-08-03 DIAGNOSIS — Z92.21 HISTORY OF CANCER CHEMOTHERAPY: ICD-10-CM

## 2023-08-03 PROCEDURE — 3079F PR MOST RECENT DIASTOLIC BLOOD PRESSURE 80-89 MM HG: ICD-10-PCS | Mod: CPTII,S$GLB,, | Performed by: STUDENT IN AN ORGANIZED HEALTH CARE EDUCATION/TRAINING PROGRAM

## 2023-08-03 PROCEDURE — 1159F MED LIST DOCD IN RCRD: CPT | Mod: CPTII,S$GLB,, | Performed by: STUDENT IN AN ORGANIZED HEALTH CARE EDUCATION/TRAINING PROGRAM

## 2023-08-03 PROCEDURE — 3008F PR BODY MASS INDEX (BMI) DOCUMENTED: ICD-10-PCS | Mod: CPTII,S$GLB,, | Performed by: STUDENT IN AN ORGANIZED HEALTH CARE EDUCATION/TRAINING PROGRAM

## 2023-08-03 PROCEDURE — 3077F SYST BP >= 140 MM HG: CPT | Mod: CPTII,S$GLB,, | Performed by: STUDENT IN AN ORGANIZED HEALTH CARE EDUCATION/TRAINING PROGRAM

## 2023-08-03 PROCEDURE — 99204 PR OFFICE/OUTPT VISIT, NEW, LEVL IV, 45-59 MIN: ICD-10-PCS | Mod: S$GLB,,, | Performed by: STUDENT IN AN ORGANIZED HEALTH CARE EDUCATION/TRAINING PROGRAM

## 2023-08-03 PROCEDURE — 99999 PR PBB SHADOW E&M-EST. PATIENT-LVL III: ICD-10-PCS | Mod: PBBFAC,,, | Performed by: STUDENT IN AN ORGANIZED HEALTH CARE EDUCATION/TRAINING PROGRAM

## 2023-08-03 PROCEDURE — 99204 OFFICE O/P NEW MOD 45 MIN: CPT | Mod: S$GLB,,, | Performed by: STUDENT IN AN ORGANIZED HEALTH CARE EDUCATION/TRAINING PROGRAM

## 2023-08-03 PROCEDURE — 3008F BODY MASS INDEX DOCD: CPT | Mod: CPTII,S$GLB,, | Performed by: STUDENT IN AN ORGANIZED HEALTH CARE EDUCATION/TRAINING PROGRAM

## 2023-08-03 PROCEDURE — 3079F DIAST BP 80-89 MM HG: CPT | Mod: CPTII,S$GLB,, | Performed by: STUDENT IN AN ORGANIZED HEALTH CARE EDUCATION/TRAINING PROGRAM

## 2023-08-03 PROCEDURE — 3077F PR MOST RECENT SYSTOLIC BLOOD PRESSURE >= 140 MM HG: ICD-10-PCS | Mod: CPTII,S$GLB,, | Performed by: STUDENT IN AN ORGANIZED HEALTH CARE EDUCATION/TRAINING PROGRAM

## 2023-08-03 PROCEDURE — 99999 PR PBB SHADOW E&M-EST. PATIENT-LVL III: CPT | Mod: PBBFAC,,, | Performed by: STUDENT IN AN ORGANIZED HEALTH CARE EDUCATION/TRAINING PROGRAM

## 2023-08-03 PROCEDURE — 1159F PR MEDICATION LIST DOCUMENTED IN MEDICAL RECORD: ICD-10-PCS | Mod: CPTII,S$GLB,, | Performed by: STUDENT IN AN ORGANIZED HEALTH CARE EDUCATION/TRAINING PROGRAM

## 2023-08-03 RX ORDER — AZELASTINE 1 MG/ML
1 SPRAY, METERED NASAL 2 TIMES DAILY
Qty: 30 ML | Refills: 11 | Status: SHIPPED | OUTPATIENT
Start: 2023-08-03 | End: 2024-08-02

## 2023-08-03 RX ORDER — FLUTICASONE PROPIONATE 50 MCG
1 SPRAY, SUSPENSION (ML) NASAL 2 TIMES DAILY
Qty: 16 G | Refills: 11 | Status: SHIPPED | OUTPATIENT
Start: 2023-08-03 | End: 2024-08-02

## 2023-08-03 NOTE — PROGRESS NOTES
"Otolaryngology Clinic Note    Subjective:       Patient ID: Rosangela Salazar is a 33 y.o. female.    Chief Complaint: Sinus Problem, Cough (mucus), Otalgia, and Dizziness      History of Present Illness: Rosangela Salazar is a 33 y.o. female presenting with issues since cancer tx 5 years ago. Has been on and off meds for this time. Notices that she has a "bubble" on left side nasal passage. Stopped flonase to see if related, was months ago. Does not hurt.    Has rhinorrhea, congestion, pressure, PND. No hyposmia. Coughs up mucous a lot. No recent sinus infections. Only a couple of colds.   Is  . No kids. Does get worse spring and fall. Summer seems bad this year. Will get dizzy/vertigo when sinus bad. Had room spinning first time, not often since.   Cannot have NSAIDS.   Has tachycardia s/p doxyrubicin. On metorolol for BP.     Has tried flonase- off now, singulair and zyrtec. Tried another nasal spray, not sure what, did not help.   On prilosec for reflux.   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       Past Surgical History:   Procedure Laterality Date    BONE GRAFT Left 11/29/2018    femur     COLONOSCOPY  01/2013    COLONOSCOPY  08/2021    KNEE SURGERY      left femur open biopsy      tumor removal left leg      WISDOM TOOTH EXTRACTION       Past Medical History:   Diagnosis Date    Allergy     seasonal    Bone cancer     Gall bladder inflammation     Mass of knee 3/29/2018    Soft tissue sarcoma     Tachycardia      Social Determinants of Health     Tobacco Use: Low Risk  (8/3/2023)    Patient History     Smoking Tobacco Use: Never     Smokeless Tobacco Use: Never     Passive Exposure: Not on file   Alcohol Use: Not At Risk (8/3/2023)    AUDIT-C     Frequency of Alcohol Consumption: Monthly or less     Average Number of Drinks: 1 or 2     Frequency of Binge Drinking: Never   Financial Resource Strain: Low Risk  (8/3/2023)    Overall Financial Resource Strain (CARDIA)     Difficulty of Paying Living Expenses: Not hard at " all   Food Insecurity: No Food Insecurity (8/3/2023)    Hunger Vital Sign     Worried About Running Out of Food in the Last Year: Never true     Ran Out of Food in the Last Year: Never true   Transportation Needs: No Transportation Needs (8/3/2023)    PRAPARE - Transportation     Lack of Transportation (Medical): No     Lack of Transportation (Non-Medical): No   Physical Activity: Sufficiently Active (8/3/2023)    Exercise Vital Sign     Days of Exercise per Week: 5 days     Minutes of Exercise per Session: 30 min   Stress: Stress Concern Present (8/3/2023)    [facility] [place] [facility] Occupational [facility] - Occupational Stress Questionnaire     Feeling of Stress : Rather much   Social Connections: Unknown (8/3/2023)    Social Connection and Isolation Panel [NHANES]     Frequency of Communication with Friends and Family: More than three times a week     Frequency of Social Gatherings with Friends and Family: Once a week     Attends Latter day Services: Not on file     Active Member of Clubs or Organizations: No     Attends Club or Organization Meetings: Patient refused     Marital Status:    Housing Stability: Low Risk  (8/3/2023)    Housing Stability Vital Sign     Unable to Pay for Housing in the Last Year: No     Number of Places Lived in the Last Year: 1     Unstable Housing in the Last Year: No   Depression: Not on file     Review of patient's allergies indicates:   Allergen Reactions    Ethanol (ethyl alcohol)     Zithromax [azithromycin] Nausea Only     Current Outpatient Medications   Medication Instructions    ALPRAZolam (XANAX) 0.25 mg, Oral, Daily PRN    busPIRone (BUSPAR) 10 MG tablet TAKE 1 TABLET BY MOUTH TWICE A DAY    calcium citrate-vitamin D3 200 mg calcium -250 unit Tab 2 tablets, Oral, Daily    cetirizine (ZYRTEC) 10 mg, Oral, Daily    fluticasone propionate (FLONASE) 50 mcg/actuation nasal spray SPRAY 2 PUFFS INTO EACH NOSTRIL EVERY DAY    metoprolol tartrate (LOPRESSOR) 25 mg, Oral, 2 times  daily    montelukast (SINGULAIR) 10 mg tablet TAKE 1 TABLET BY MOUTH EVERY DAY AT NIGHT    multivitamin (THERAGRAN) per tablet 2 tablets, Oral    norgestrel-ethinyl estradioL (LO/OVRAL) 0.3-30 mg-mcg per tablet 1 tablet, Oral, Daily, Skip placebo week.    omeprazole (PRILOSEC) 40 mg, Oral, Daily    psyllium (METAMUCIL) powder 1 packet, Oral         ENT ROS negative except as stated above.     Patient answers are not available for this visit.            Objective:      Vitals:    08/03/23 1300   BP: (!) 140/87       General: NAD, well appearing  Eyes: Normal conjunctiva and lids  Face: symmetric, nerve intact  Nose: The nose is without any evidence of any deformity. The nasal mucosa is moist. The septum is midline. There is no evidence of septal hematoma. The turbinates are without abnormality.   Ears: The ears are with normal-appearing pinna. Examination of the canals is normal appearing bilaterally. There is no drainage or erythema noted. The tympanic membranes are normal appearing with pearly color, normal-appearing landmarks and normal light reflex. Hearing is grossly intact.  Mouth: No obvious abnormalities to the lips. The teeth are unremarkable. The gingivae are without any obvious evidence of infection or lesion. The oral mucosa is moist and pink. There are no obvious masses to the hard or soft palate.   Oropharynx: The uvula is midline.  The tongue is midline. The posterior pharynx is without erythema or exudate. The tonsils are normal appearing 2+.  Salivary glands: The salivary glands are symmetric and not enlarged, no masses  Neck: No lymphadenopathy, trachea midline, thryoid not enlarged.  Psych: Normal mood and affect.   Neuro: Grossly intact  Speech: fluent       Assessment and Plan:       1. Seasonal allergies    2. History of cancer chemotherapy          Nasal area is ULC/LLC junction    I recommend a 6-8 week trial of a full sinus regimen: twice daily saline sinus rinses followed by flonase and  astelin nasal sprays twice a day and a daily antihistamine pill. Instructions for sinus rinses given. And singulair.   - Will do allergy testing now.     Afrin for bad congestion with other medical issues. 3 days max.     RTC: PRN    Plan of care was discussed in detail with the patient, who agreed with the plan as above. All questions were answered in detail.     Sarahi Sinha MD  Otolaryngology

## 2023-08-03 NOTE — PATIENT INSTRUCTIONS
I recommend a 6-8 week trial of a full sinus regimen: twice daily saline sinus rinses followed by flonase and astelin nasal sprays twice a day and a daily antihistamine pill. Instructions for sinus rinses given. And singulair.     Afrin for bad congestion with other medical issues. 3 days max.     PLEASE PERFORM SINUS RINSES 2 TIMES DAILY UNTIL YOUR NEXT VISIT.          DIRECTIONS FOR SINUS SALINE RINSE To see demonstration: Enter [email or web address].com/watch?[email or web address] into the browser address box, or go to You tube, and under the search box, enter sinus rinse. Click on [facility] Sinus Rinse Video    Step 1    Step 1 Please wash your hands. Fill the clean bottle with the designated volume of warm distilled water, filtered water or previously boiled water. You may warm the water in a microwave but we recommend that you warm it in increments of five seconds. This is to avoid excessive heating of the water and damage to the device or scalding your nasal passage.    Step 2    Step 2 Cut the SINUS RINSE mixture packet at the corner and pour its contents into the bottle. Tighten the cap & tube on the bottle securely, place one finger over the tip of the cap and shake the bottle gently to dissolve the mixture.      Step 3    Step 3 Standing in front of a sink, bend forward to your comfort level and tilt your head down. Keeping your mouth open without holding your breath, place cap snugly against your nasal passage and SQUEEZE BOTTLE GENTLY until the solution starts draining from the OPPOSITE nasal passage or from your mouth. Keep squeezing the bottle GENTLY until at least 1/4 to 1/2 (60 to 120 mL) of the bottle is used for a proper rinse. Do not swallow the solution.    Step 4    Blow your nose gently, without pinching your nose completely because this will apply pressure on the eardrums. If tolerable, sniff in any residual solution remaining in the nasal passage once or twice prior to blowing your nose as this  may clean out the posterior nasopharyngeal area (the area at the back of your nasal passage). Some solution will reach the back of the throat, so please spit it out. To help improve drainage of any residual solution, blow your nose gently while tilting your head to the opposite side of the nasal passage that you just rinsed.    Step 5    Now repeat steps 3 & 4 for your other nasal passage.    Step 6     Air dry the Sinus Rinse bottle, cap, and tube on a clean paper towel, a glass plate to store the bottle cap and tube. If there is any solution leftover, please discard it. We recommend you make a fresh solution each time you rinse. Rinse 5 times each day, OR as directed by your physician. Warnings: DO NOT RINSE IF NASAL PASSAGE IS COMPLETELY BLOCKED OR IF YOU HAVE AN EAR INFECTION OR BLOCKED EARS. If you have had ear surgery, please contact your physician prior to irrigation. If you experience any pressure in your ears, stop the rinse and get further directions from your physician or contact our office during regular business hours. To avoid any ear discomfort: Heat the solution to lukewarm, do not use hot, boiling or cold water. Keep your mouth open. Do not hold your breath and if possible make the sound NAV....NAV... Make sure to take the position as shown. Gently squeeze 1/4 of the bottle at a time (60mL / 2 ounces). Stop the rinse if you feel any solution sensation near the ears. You may rinse with a partially blocked nasal passage. Please do not use for any other purposes. Please rinse at least ONE HOUR PRIOR to bedtime, in order to avoid any residual solution dripping in the throat.    >> Before using the SINUS RINSE kit, please inspect the cap, tube and bottle carefully for wear and tear. If any of the components appear discolored or cracked, please contact [employer] to obtain a replacement. You must follow the cleaning instructions provided in this brochure or cleaning instruction card prior to each  use.    >> The SINUS RINSE bottle and mixture are to be used only for nasalirrigation. Do not use for any other purposes.    >> We recommend that you use the rinse ONE HOUR PRIOR to bedtime in order to avoid any residual solution dripping in the throat.    Tips to avoid ear discomfort while rinsing    If you have had ear surgery, please contact your physician prior to irrigation. Do not use if you have an ear infection or blocked ears. Rinse with lukewarm water. Keep your mouth open. Do not hold your breath while rinsing. While rinsing, make sure to tilt your. Gently squeeze the bottle while rinsing; do not squeeze the bottle very forcefully. Stop the rinse if you feel a sensation of fluid near your ears.    Tips to avoid unexpected drainage after rinsing    In rare situations, especially if you have had sinus surgery, the saline solution can pool in the sinus cavities and nasal passages and then drip from your nostrils hours after rinsing. To avoid this harmless but annoying inconvenience, take one extra step after rinsing: lean forward, tilt your head sideways and gently blow your nose. Then, tilt your head to the other side and blow again. You may need to repeat this several times. This will help rid your nasal passages of any excess mucus and remaining saline solution. If you find yourself experiencing delayed drainage often, do not rinse right before leaving your house or going to bed.

## 2023-11-27 ENCOUNTER — OFFICE VISIT (OUTPATIENT)
Dept: OBSTETRICS AND GYNECOLOGY | Facility: CLINIC | Age: 33
End: 2023-11-27
Payer: COMMERCIAL

## 2023-11-27 VITALS
HEIGHT: 67 IN | DIASTOLIC BLOOD PRESSURE: 74 MMHG | BODY MASS INDEX: 40 KG/M2 | WEIGHT: 254.88 LBS | SYSTOLIC BLOOD PRESSURE: 120 MMHG

## 2023-11-27 DIAGNOSIS — Z01.419 ENCOUNTER FOR GYNECOLOGICAL EXAMINATION (GENERAL) (ROUTINE) WITHOUT ABNORMAL FINDINGS: ICD-10-CM

## 2023-11-27 DIAGNOSIS — Z12.4 ENCOUNTER FOR SCREENING FOR CERVICAL CANCER: Primary | ICD-10-CM

## 2023-11-27 DIAGNOSIS — Z11.51 ENCOUNTER FOR SCREENING FOR HUMAN PAPILLOMAVIRUS (HPV): ICD-10-CM

## 2023-11-27 PROCEDURE — 3078F PR MOST RECENT DIASTOLIC BLOOD PRESSURE < 80 MM HG: ICD-10-PCS | Mod: CPTII,S$GLB,, | Performed by: OBSTETRICS & GYNECOLOGY

## 2023-11-27 PROCEDURE — 3074F PR MOST RECENT SYSTOLIC BLOOD PRESSURE < 130 MM HG: ICD-10-PCS | Mod: CPTII,S$GLB,, | Performed by: OBSTETRICS & GYNECOLOGY

## 2023-11-27 PROCEDURE — 99395 PREV VISIT EST AGE 18-39: CPT | Mod: S$GLB,,, | Performed by: OBSTETRICS & GYNECOLOGY

## 2023-11-27 PROCEDURE — 3008F BODY MASS INDEX DOCD: CPT | Mod: CPTII,S$GLB,, | Performed by: OBSTETRICS & GYNECOLOGY

## 2023-11-27 PROCEDURE — 99999 PR PBB SHADOW E&M-EST. PATIENT-LVL III: CPT | Mod: PBBFAC,,, | Performed by: OBSTETRICS & GYNECOLOGY

## 2023-11-27 PROCEDURE — 3078F DIAST BP <80 MM HG: CPT | Mod: CPTII,S$GLB,, | Performed by: OBSTETRICS & GYNECOLOGY

## 2023-11-27 PROCEDURE — 3044F PR MOST RECENT HEMOGLOBIN A1C LEVEL <7.0%: ICD-10-PCS | Mod: CPTII,S$GLB,, | Performed by: OBSTETRICS & GYNECOLOGY

## 2023-11-27 PROCEDURE — 1159F MED LIST DOCD IN RCRD: CPT | Mod: CPTII,S$GLB,, | Performed by: OBSTETRICS & GYNECOLOGY

## 2023-11-27 PROCEDURE — 88175 CYTOPATH C/V AUTO FLUID REDO: CPT | Performed by: OBSTETRICS & GYNECOLOGY

## 2023-11-27 PROCEDURE — 3074F SYST BP LT 130 MM HG: CPT | Mod: CPTII,S$GLB,, | Performed by: OBSTETRICS & GYNECOLOGY

## 2023-11-27 PROCEDURE — 3044F HG A1C LEVEL LT 7.0%: CPT | Mod: CPTII,S$GLB,, | Performed by: OBSTETRICS & GYNECOLOGY

## 2023-11-27 PROCEDURE — 1159F PR MEDICATION LIST DOCUMENTED IN MEDICAL RECORD: ICD-10-PCS | Mod: CPTII,S$GLB,, | Performed by: OBSTETRICS & GYNECOLOGY

## 2023-11-27 PROCEDURE — 3008F PR BODY MASS INDEX (BMI) DOCUMENTED: ICD-10-PCS | Mod: CPTII,S$GLB,, | Performed by: OBSTETRICS & GYNECOLOGY

## 2023-11-27 PROCEDURE — 99999 PR PBB SHADOW E&M-EST. PATIENT-LVL III: ICD-10-PCS | Mod: PBBFAC,,, | Performed by: OBSTETRICS & GYNECOLOGY

## 2023-11-27 PROCEDURE — 99395 PR PREVENTIVE VISIT,EST,18-39: ICD-10-PCS | Mod: S$GLB,,, | Performed by: OBSTETRICS & GYNECOLOGY

## 2023-11-27 PROCEDURE — 87624 HPV HI-RISK TYP POOLED RSLT: CPT | Performed by: OBSTETRICS & GYNECOLOGY

## 2023-11-27 RX ORDER — LORAZEPAM 0.5 MG/1
TABLET ORAL
COMMUNITY
Start: 2023-09-18

## 2023-11-27 NOTE — PROGRESS NOTES
Subjective:       Patient ID: Rosangela Salazar is a 33 y.o. female.    Chief Complaint:  Annual Exam (Contraception: Lo/orval./Last pap + hpv: 2020 (nml) (neg)/Reports of sporadic cycles, prev one 3 months. )        History of Present Illness  Rosangela Salazar is a 33 y.o. female  who presents for annual. Overall doing well on OCP. Considering pregnancy soon. Cancer free for 5 years! Had a recent scare for liver lesion, but ended up being fine. Recommend PNV. all questions answered. Face to face time 20 minutes    Patient's last menstrual period was 2023 (approximate).   Date of Last Pap: 5/15/2020    Review of Systems  Review of Systems   Constitutional:  Negative for chills and fever.        Objective:   Physical Exam:   Constitutional: She is oriented to person, place, and time. Vital signs are normal. She appears well-developed and well-nourished. No distress.        Pulmonary/Chest: She exhibits no mass. Right breast exhibits no mass, no nipple discharge, no skin change, no tenderness, no bleeding and no swelling. Left breast exhibits no mass, no nipple discharge, no skin change, no tenderness, no bleeding and no swelling. Breasts are symmetrical.        Abdominal: Soft. Bowel sounds are normal. She exhibits no distension and no mass. There is no abdominal tenderness. There is no rebound.     Genitourinary:    Vagina and uterus normal.   There is no rash, tenderness, lesion or injury on the right labia. There is no rash, tenderness, lesion or injury on the left labia. Cervix is normal. Right adnexum displays no mass, no tenderness and no fullness. Left adnexum displays no mass, no tenderness and no fullness. No erythema,  no vaginal discharge, tenderness, rectocele, cystocele or unspecified prolapse of vaginal walls in the vagina. Cervix exhibits no motion tenderness, no discharge and no friability. Uterus is not deviated, not enlarged, not fixed, not tender and not hosting fibroids.            Musculoskeletal: Normal range of motion and moves all extremeties.      Lymphadenopathy:        Right: No supraclavicular adenopathy present.        Left: No supraclavicular adenopathy present.    Neurological: She is alert and oriented to person, place, and time.    Skin: Skin is warm and dry.    Psychiatric: She has a normal mood and affect. Her behavior is normal. Judgment normal.        Assessment/ Plan:     1. Encounter for screening for cervical cancer  Liquid-Based Pap Smear, Screening      2. Encounter for screening for human papillomavirus (HPV)  HPV High Risk Genotypes, PCR      3. Encounter for gynecological examination (general) (routine) without abnormal findings            No follow-ups on file.    As of April 1, 2021, the Cures Act has been passed nationally. This new law requires that all doctors progress notes, lab results, pathology reports and radiology reports be released IMMEDIATELY to the patient in the patient portal. That means that the results are released to you at the EXACT same time they are released to me. Therefore, with all of the patients that I have I am not able to reply to each patient exactly when the results come in. So there will be a delay from when you see the results to when I see them and have time to come up with a response to send you. Also I only see these results when I am on the computer at work. So if the results come in over the weekend or after 5 pm of a work day, I will not see them until the next business day. As you can tell, this is a challenge as a physician to give every patient the quick response they hope for and deserve. So please be patient! Thanks for understanding, Dr. Pereyra

## 2023-12-04 LAB
FINAL PATHOLOGIC DIAGNOSIS: NORMAL
Lab: NORMAL

## 2023-12-26 ENCOUNTER — PATIENT MESSAGE (OUTPATIENT)
Dept: OBSTETRICS AND GYNECOLOGY | Facility: CLINIC | Age: 33
End: 2023-12-26
Payer: COMMERCIAL

## 2023-12-31 DIAGNOSIS — N92.0 MENORRHAGIA WITH REGULAR CYCLE: ICD-10-CM

## 2024-01-02 RX ORDER — NORGESTREL AND ETHINYL ESTRADIOL 0.3-0.03MG
KIT ORAL
Qty: 84 TABLET | Refills: 3 | Status: SHIPPED | OUTPATIENT
Start: 2024-01-02

## 2024-01-02 NOTE — TELEPHONE ENCOUNTER
Refill Decision Note   Rosangela Salazar  is requesting a refill authorization.  Brief Assessment and Rationale for Refill:  Approve     Medication Therapy Plan:         Comments:     Note composed:11:41 AM 01/02/2024

## 2024-03-28 ENCOUNTER — PATIENT MESSAGE (OUTPATIENT)
Dept: OBSTETRICS AND GYNECOLOGY | Facility: CLINIC | Age: 34
End: 2024-03-28

## 2024-03-28 ENCOUNTER — OFFICE VISIT (OUTPATIENT)
Dept: OBSTETRICS AND GYNECOLOGY | Facility: CLINIC | Age: 34
End: 2024-03-28
Attending: OBSTETRICS & GYNECOLOGY
Payer: COMMERCIAL

## 2024-03-28 VITALS — BODY MASS INDEX: 39.74 KG/M2 | WEIGHT: 253.19 LBS | HEIGHT: 67 IN

## 2024-03-28 DIAGNOSIS — R63.5 WEIGHT GAIN: ICD-10-CM

## 2024-03-28 DIAGNOSIS — G43.829 MENSTRUAL MIGRAINE WITHOUT STATUS MIGRAINOSUS, NOT INTRACTABLE: Primary | ICD-10-CM

## 2024-03-28 DIAGNOSIS — N92.6 IRREGULAR PERIODS: ICD-10-CM

## 2024-03-28 PROCEDURE — 99999 PR PBB SHADOW E&M-EST. PATIENT-LVL III: CPT | Mod: PBBFAC,,, | Performed by: OBSTETRICS & GYNECOLOGY

## 2024-03-28 PROCEDURE — 3008F BODY MASS INDEX DOCD: CPT | Mod: CPTII,S$GLB,, | Performed by: OBSTETRICS & GYNECOLOGY

## 2024-03-28 PROCEDURE — 1159F MED LIST DOCD IN RCRD: CPT | Mod: CPTII,S$GLB,, | Performed by: OBSTETRICS & GYNECOLOGY

## 2024-03-28 PROCEDURE — 99214 OFFICE O/P EST MOD 30 MIN: CPT | Mod: S$GLB,,, | Performed by: OBSTETRICS & GYNECOLOGY

## 2024-03-28 RX ORDER — BUTALBITAL, ACETAMINOPHEN AND CAFFEINE 50; 325; 40 MG/1; MG/1; MG/1
1 TABLET ORAL EVERY 6 HOURS PRN
Qty: 30 TABLET | Refills: 0 | Status: SHIPPED | OUTPATIENT
Start: 2024-03-28 | End: 2024-04-01 | Stop reason: SDUPTHER

## 2024-03-28 NOTE — PROGRESS NOTES
Subjective:       Patient ID: Rosangela Salazar is a 34 y.o. female.    Chief Complaint:  migraines (C/o migraines when not taking ocp/) and Weight Gain (C/o not being able to lose weight)        History of Present Illness  Rosangela Salazar is a 34 y.o. female  who presents for evaluation of menstrual migraines, weight gain and desire for pregnancy. 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 While she was undergoing treatment in the past she was in a menopausal state at one point because of the treatment. She was on OCP for hormonal support and contraception. She does desire pregnancy now. At our last visit we discussed stopping OCP to see what happens-- (is she still in menopause or not. Is she going to get periods or not?) So she did that and stopped the pill but then had the worst menstrual migraine she ever had off of the OCP so she restarted it 10 days later and has been back on. She still wants to try for pregnancy but is afraid of the headache. Asking about hormone levels but I explained that is hard to measure while on OCP. I recommend we try Fioricet for the HA and try getting off again and will check labs on day 3 of her cycle. I explained it may take awhile off of OCP for her hormone levels to adjust. So we will plan to repeat labs again a few months off of OCP. Also we will see if off OCP does she get periods or no periods and had menopausal symptoms. She voiced understanding. Her other issue is weight and unexplained weight gain. We discussed this in detail as well. I will also order a neuro consult for severe menstrual migraine. All questions answered. Pt agrees with the plan.     Patient's last menstrual period was 2024 (approximate).   Date of Last Pap: 2023    Review of Systems  Review of Systems   Constitutional:  Negative for chills and fever.        Objective:   Physical Exam:   Constitutional: She appears well-developed and well-nourished.                                   Assessment/ Plan:     1. Menstrual migraine without status migrainosus, not intractable  butalbital-acetaminophen-caffeine -40 mg (FIORICET, ESGIC) -40 mg per tablet    Ambulatory referral/consult to Neurology      2. Irregular periods  Antimullerian Hormone (AMH)    Estradiol    Follicle Stimulating Hormone    TSH      3. Weight gain  Antimullerian Hormone (AMH)    Estradiol    Follicle Stimulating Hormone    TSH        Labs day#3 of cycle  Will likely need to repeat once off OCP for awhile to see what her own body does.   No follow-ups on file.    As of April 1, 2021, the Cures Act has been passed nationally. This new law requires that all doctors progress notes, lab results, pathology reports and radiology reports be released IMMEDIATELY to the patient in the patient portal. That means that the results are released to you at the EXACT same time they are released to me. Therefore, with all of the patients that I have I am not able to reply to each patient exactly when the results come in. So there will be a delay from when you see the results to when I see them and have time to come up with a response to send you. Also I only see these results when I am on the computer at work. So if the results come in over the weekend or after 5 pm of a work day, I will not see them until the next business day. As you can tell, this is a challenge as a physician to give every patient the quick response they hope for and deserve. So please be patient! Thanks for understanding, Dr. Pereyra

## 2024-03-30 ENCOUNTER — OFFICE VISIT (OUTPATIENT)
Dept: URGENT CARE | Facility: CLINIC | Age: 34
End: 2024-03-30
Payer: COMMERCIAL

## 2024-03-30 VITALS
DIASTOLIC BLOOD PRESSURE: 97 MMHG | SYSTOLIC BLOOD PRESSURE: 156 MMHG | TEMPERATURE: 98 F | BODY MASS INDEX: 39.71 KG/M2 | HEIGHT: 67 IN | OXYGEN SATURATION: 100 % | RESPIRATION RATE: 18 BRPM | HEART RATE: 108 BPM | WEIGHT: 253 LBS

## 2024-03-30 DIAGNOSIS — J06.9 VIRAL URI WITH COUGH: Primary | ICD-10-CM

## 2024-03-30 DIAGNOSIS — R05.1 ACUTE COUGH: ICD-10-CM

## 2024-03-30 LAB
CTP QC/QA: YES
CTP QC/QA: YES
POC MOLECULAR INFLUENZA A AGN: NEGATIVE
POC MOLECULAR INFLUENZA B AGN: NEGATIVE
SARS-COV-2 AG RESP QL IA.RAPID: NEGATIVE

## 2024-03-30 PROCEDURE — 87811 SARS-COV-2 COVID19 W/OPTIC: CPT | Mod: QW,S$GLB,, | Performed by: NURSE PRACTITIONER

## 2024-03-30 PROCEDURE — 99213 OFFICE O/P EST LOW 20 MIN: CPT | Mod: S$GLB,,, | Performed by: NURSE PRACTITIONER

## 2024-03-30 PROCEDURE — 87502 INFLUENZA DNA AMP PROBE: CPT | Mod: QW,S$GLB,, | Performed by: NURSE PRACTITIONER

## 2024-03-30 NOTE — PATIENT INSTRUCTIONS

## 2024-03-30 NOTE — PROGRESS NOTES
"Subjective:      Patient ID: Rosangela Salazar is a 34 y.o. female.    Vitals:  height is 5' 7" (1.702 m) and weight is 114.8 kg (253 lb). Her oral temperature is 98.2 °F (36.8 °C). Her blood pressure is 156/97 (abnormal) and her pulse is 108. Her respiration is 18 and oxygen saturation is 100%.     Chief Complaint: Sore Throat    Symptoms began 3/27/24. They include sore throat, productive cough and chest congestion.    Sore Throat   This is a new problem. The current episode started in the past 7 days. The problem has been unchanged. Neither side of throat is experiencing more pain than the other. There has been no fever. The pain is at a severity of 2/10. The pain is mild. Associated symptoms include coughing. Pertinent negatives include no congestion, diarrhea, ear pain or vomiting. She has tried nothing for the symptoms.       Constitution: Negative. Negative for chills, sweating and fatigue.   HENT:  Positive for sore throat. Negative for ear pain, facial swelling and congestion.    Neck: Negative for painful lymph nodes.   Cardiovascular: Negative.  Negative for chest trauma, chest pain and sob on exertion.   Eyes: Negative.  Negative for eye itching and eye pain.   Respiratory:  Positive for cough. Negative for chest tightness and asthma.    Gastrointestinal: Negative.  Negative for nausea, vomiting and diarrhea.   Endocrine: negative. cold intolerance and excessive thirst.   Genitourinary: Negative.  Negative for dysuria, frequency, urgency and hematuria.   Musculoskeletal:  Negative for pain, trauma and joint pain.   Skin: Negative.  Negative for rash, wound and hives.   Allergic/Immunologic: Negative.  Negative for eczema, asthma, hives and itching.   Neurological: Negative.  Negative for disorientation and altered mental status.   Hematologic/Lymphatic: Negative.  Negative for swollen lymph nodes.   Psychiatric/Behavioral: Negative.  Negative for altered mental status, disorientation and confusion.     "   Objective:     Physical Exam   Constitutional: She is oriented to person, place, and time. She appears well-developed. She is cooperative.  Non-toxic appearance. She does not appear ill. No distress.   HENT:   Head: Normocephalic and atraumatic.   Ears:   Right Ear: Hearing, tympanic membrane, external ear and ear canal normal. impacted cerumen  Left Ear: Hearing, tympanic membrane, external ear and ear canal normal. impacted cerumen  Nose: Nose normal. No mucosal edema, rhinorrhea, nasal deformity or congestion. No epistaxis. Right sinus exhibits no maxillary sinus tenderness and no frontal sinus tenderness. Left sinus exhibits no maxillary sinus tenderness and no frontal sinus tenderness.   Mouth/Throat: Uvula is midline, oropharynx is clear and moist and mucous membranes are normal. No trismus in the jaw. Normal dentition. No uvula swelling. No oropharyngeal exudate, posterior oropharyngeal edema or posterior oropharyngeal erythema.   Eyes: Conjunctivae and lids are normal. No scleral icterus.   Neck: Trachea normal and phonation normal. Neck supple. No edema present. No erythema present. No neck rigidity present.   Cardiovascular: Normal rate, regular rhythm, normal heart sounds and normal pulses.   Pulmonary/Chest: Effort normal and breath sounds normal. No stridor. No respiratory distress. She has no decreased breath sounds. She has no wheezes. She has no rhonchi. She has no rales. She exhibits no tenderness.   Abdominal: Normal appearance. There is no abdominal tenderness.   Musculoskeletal: Normal range of motion.         General: No deformity. Normal range of motion.   Lymphadenopathy:     She has no cervical adenopathy.   Neurological: no focal deficit. She is alert, oriented to person, place, and time and at baseline. She exhibits normal muscle tone. Coordination normal.   Skin: Skin is warm, dry, intact, not diaphoretic and not pale.   Psychiatric: Her speech is normal and behavior is normal. Mood,  judgment and thought content normal.   Nursing note and vitals reviewed.    Results for orders placed or performed in visit on 03/30/24   SARS Coronavirus 2 Antigen, POCT Manual Read   Result Value Ref Range    SARS Coronavirus 2 Antigen Negative Negative     Acceptable Yes    POCT Influenza A/B MOLECULAR   Result Value Ref Range    POC Molecular Influenza A Ag Negative Negative, Not Reported    POC Molecular Influenza B Ag Negative Negative, Not Reported     Acceptable Yes          Assessment:     1. Viral URI with cough    2. Acute cough        Plan:   FOLLOWUP  Follow up if symptoms worsen or fail to improve, for PLEASE CONTACT PCP OR CONTACT THE EMERGENCY ROOM..     PATIENT INSTRUCTIONS  Patient Instructions   INSTRUCTIONS:  - Rest.  - Drink plenty of fluids.  - Take Tylenol and/or Ibuprofen as directed as needed for fever/pain.  Do not take more than the recommended dose.  - follow up with your PCP within the next 1-2 weeks as needed.  - You must understand that you have received an Urgent Care treatment only and that you may be released before all of your medical problems are known or treated.   - You, the patient, will arrange for follow up care as instructed.   - If your condition worsens or fails to improve we recommend that you receive another evaluation at the ER immediately or contact your PCP to discuss your concerns.   - You can call [phone] or [phone] to help schedule an appointment with the appropriate provider.     -If you smoke cigarettes, it would be beneficial for you to stop.         THANK YOU FOR ALLOWING ME TO PARTICIPATE IN YOUR HEALTHCARE,     Akshat Fitzgerald, SIMONE     Viral URI with cough    Acute cough  -     SARS Coronavirus 2 Antigen, POCT Manual Read  -     POCT Influenza A/B MOLECULAR

## 2024-04-01 DIAGNOSIS — G43.829 MENSTRUAL MIGRAINE WITHOUT STATUS MIGRAINOSUS, NOT INTRACTABLE: ICD-10-CM

## 2024-04-01 RX ORDER — BUTALBITAL, ACETAMINOPHEN AND CAFFEINE 50; 325; 40 MG/1; MG/1; MG/1
1 TABLET ORAL EVERY 6 HOURS PRN
Qty: 30 TABLET | Refills: 0 | Status: SHIPPED | OUTPATIENT
Start: 2024-04-01 | End: 2024-04-08 | Stop reason: SDUPTHER

## 2024-04-03 ENCOUNTER — PATIENT MESSAGE (OUTPATIENT)
Dept: OBSTETRICS AND GYNECOLOGY | Facility: CLINIC | Age: 34
End: 2024-04-03
Payer: COMMERCIAL

## 2024-04-08 ENCOUNTER — PATIENT MESSAGE (OUTPATIENT)
Dept: OBSTETRICS AND GYNECOLOGY | Facility: CLINIC | Age: 34
End: 2024-04-08
Payer: COMMERCIAL

## 2024-04-08 DIAGNOSIS — G43.829 MENSTRUAL MIGRAINE WITHOUT STATUS MIGRAINOSUS, NOT INTRACTABLE: ICD-10-CM

## 2024-04-08 RX ORDER — BUTALBITAL, ACETAMINOPHEN AND CAFFEINE 50; 325; 40 MG/1; MG/1; MG/1
1 TABLET ORAL EVERY 6 HOURS PRN
Qty: 30 TABLET | Refills: 0 | Status: SHIPPED | OUTPATIENT
Start: 2024-04-08 | End: 2024-05-08

## 2024-07-02 ENCOUNTER — CLINICAL SUPPORT (OUTPATIENT)
Dept: REHABILITATION | Facility: HOSPITAL | Age: 34
End: 2024-07-02
Payer: COMMERCIAL

## 2024-07-02 DIAGNOSIS — R26.2 DIFFICULTY WALKING: ICD-10-CM

## 2024-07-02 DIAGNOSIS — M72.2 PLANTAR FASCIITIS OF LEFT FOOT: Primary | ICD-10-CM

## 2024-07-02 PROCEDURE — 97110 THERAPEUTIC EXERCISES: CPT | Mod: PN

## 2024-07-02 PROCEDURE — 97162 PT EVAL MOD COMPLEX 30 MIN: CPT | Mod: PN

## 2024-07-02 PROCEDURE — 97140 MANUAL THERAPY 1/> REGIONS: CPT | Mod: PN

## 2024-07-02 NOTE — PROGRESS NOTES
[facility]   Physical Therapy Initial Evaluation      Name: Rosangela Salazar  Clinic Number: [id number]    Therapy Diagnosis:   Encounter Diagnoses   Name Primary?    Plantar fasciitis of left foot Yes    Difficulty walking      Physician: Mattie Castro MD    Physician Orders: PT Eval and Treat   Medical Diagnosis from Referral: M72.2 (ICD-10-CM) - Plantar fasciitis of left foot  Evaluation Date: 7/2/2024  Authorization Period Expiration: 12/31/2024  Plan of Care Expiration: 9/2/2024  Visit # / Visits authorized: 1/ 1  #Visits based on PHYSICAL THERAPY POC:     Foto  Date  Score      Foot  Lower Score = Greater Disability   #1/3 7/2/2024 51.0   #2/3     #3/3       Time in  3:25pm   Time out 5:08pm   Total Appointment Time (timed & untimed codes) 103 minutes      Precautions: Standard, patient is now 5 years out from cancer treatment     Subjective     Date of injury: about a year ago   History of current condition - L foot pain started about a year ago, but has gotten progressively worse and is now impeding her ability to walk. Had treatment for R plantar fascia about 12 years ago with good results.     Lymphedema at L lower leg above knee to ankle level noted since having cancer treatment. She states that she has a referral for getting this treated if needed.      Prior medical treatment: none for L plantar fascia     History of falls/MVAs: patient slipped and fell on something wet recently, but denies significant injuries afterwards.     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      Occupation:      Imaging:  L knee x-ray noted is previous resection of midline to medial distal left femoral metadiaphysis with allograft reconstruction and fixation with 2 screws and single cerclage wire. There  is partial allograft fusion/integration to native femur without interval change since 09/18/2023 and increased since 12/19/2018. No hardware failure or loosening.    Social History: no stairs to enter home     Pain:      Current 0/10       Worst 6/10       Best  0/10     Location Bottom of L heel    Description  Intermittent, stabbing - like you are stepping on a knife     Aggravating factors Worse with standing, walking    Easing factors  Sitting down, getting weight off of it     Medical History:   Past Medical History:   Diagnosis Date    Allergy     seasonal    Bone cancer     Gall bladder inflammation     Mass of knee 3/29/2018    Soft tissue sarcoma     Tachycardia      Surgical History:   Rosangela Salazar  has a past surgical history that includes [place] tooth extraction; Colonoscopy (01/2013); Knee surgery; left femur open biopsy; tumor removal left leg; Bone graft (Left, 11/29/2018); and Colonoscopy (08/2021).    Medications:   Rosangela has a current medication list which includes the following prescription(s): alprazolam, azelastine, buspirone, calcium citrate-vitamin d3, cetirizine, dicyclomine, fluticasone propionate, lorazepam, low-ogestrel (28), metoprolol tartrate, montelukast, multivitamin, omeprazole, and psyllium.    Allergies:   Review of patient's allergies indicates:   Allergen Reactions    Ethanol (ethyl alcohol)     Zithromax [azithromycin] Nausea Only      Objective    7/2/2024:  Observation/posture:   non weight bearing: R arch slightly higher than L arch   Weight bearing: R arch slightly higher than L arch     Gait/assistive device:   Quad cane for longer walks - ex: hiking     Range of Motion: AROM:  Ankle Right  Left    Dorsiflexion 7 10   Plantarflexion 42 48   Inversion 34 29   Eversion 13 20     Knee  Right  Left    Flexion  128 111   Extension  -3 -3     Strength:  Ankle Right  Left    Dorsiflexion 4+/5 4+/5   Plantarflexion 4/5 4/5   Inversion 4+/5 4+/5   Eversion 4/5 4/5     Hip  Right   "Left    Flexion  4/5 4/5   Internal rotation  4/5 4/5   External rotation  4/5 4-/5     Knee  Right  Left    Flexion  4/5 4-/5   Extension  4+/5 3+/5     Joint Mobility:    Left   TCJ Hypomobile    STJ Hypomobile      Flexibility:    Left   Gastroc Restricted    Soleus Restricted    Plantar fascia  Restricted, knotting along tissues      Function/balance:    Right Left   Single limb stance 27", 23", 28" 30", 6", 18"      Edema:    Right Left   Ankle figure of eight 52.5 cm 55.0 cm   Ankle joint line 27.0 cm  28.0 cm   METs 24.0 cm 23.0 cm     Treatment   Total Treatment time (time-based codes) separate from Evaluation: 40 minutes    + indicates new exercise   Bold indicates completed exercise    therapeutic exercises to develop strength, endurance, ROM, flexibility, posture, and core stabilization for 25 minutes:  Patient education on nature of current condition and PHYSICAL THERAPY POC  Written HOME EXERCISE PROGRAM provided, reviewed, patient demo understanding   L soleus stretch, incline board, 30"x3   Initiate - R/L side lying clam shell   L knee LAQ, blue band at ankles, 3x10     manual therapy techniques: Joint mobilizations, Manual traction, Myofacial release, Soft tissue Mobilization, and Friction Massage for 15 minutes:  L STM Plantar Fascia, Achilles   L TCJ, STJ Gr III/IV JM    neuromuscular re-education activities to improve: Balance, Coordination, Kinesthetic, Sense, Proprioception, and Posture for 00 minutes:      therapeutic activities to improve functional performance for 00  minutes:      gait training to improve functional mobility and safety for 00  minutes:      Home Exercises and Patient Education Provided  Education provided:   - yes    Home Exercises Provided: yes.  Exercises were reviewed and Rosangela was able to demonstrate them prior to the end of the session.  Rosangela demonstrated good  understanding of the education provided.     Assessment     Rosangela is a 34 y.o. referred to outpatient " Physical Therapy with a medical diagnosis of M72.2 (ICD-10-CM) - Plantar fasciitis of left foot.     Pt presents with decreased ROM, muscle strength, flexibility, joint mobility. Increased pain, stiffness, soft tissue restriction. Impaired posture, joint mechanics, balance, gait pattern.     The patient's current task deficits include the following: limitation in ADL, self care, social/recreational tasks.     Patient prognosis: good  Rehab potential: good    Patient will benefit from skilled outpatient Physical Therapy to address the deficits stated above and in the chart below, provide patient /family education, to maximize patient's level of independence, and to address functional deficits.    Plan of care discussed with patient: Yes  Patient's spiritual, cultural and educational needs considered and patient is agreeable to the plan of care and goals as stated below:     Anticipated Barriers for therapy: chronicity of current injury and co morbidities     Medical Necessity is demonstrated by the following  History  Co-morbidities and personal factors that may impact the plan of care [] LOW: no personal factors / co-morbidities  [] MODERATE: 1-2 personal factors / co-morbidities  [x] HIGH: 3+ personal factors / co-morbidities    Moderate / High Support Documentation:   Co-morbidities affecting plan of care:    Allergy    seasonal    Bone cancer    Gall bladder inflammation    Mass of knee    Soft tissue sarcoma    Tachycardia     Personal Factors:   coping style  social background  lifestyle     Examination  Body Structures and Functions, activity limitations and participation restrictions that may impact the plan of care [] LOW: addressing 1-2 elements  [x] MODERATE: 3+ elements  [] HIGH: 4+ elements    Clinical Presentation [] LOW: stable  [x] MODERATE: Evolving  [] HIGH: Unstable     Decision Making/ Complexity Score: moderate       Goals:   Short Terms Goals: 3 weeks    Goal  Progress  Date    (1)  Patient will  "be I with HOME EXERCISE PROGRAM  Initial, Not Met 7/2/2024     (2)  Patient will obtain 4/5 L hip external rotation MMT to indicate improved ability to complete transitional movements   Initial, Not Met  7/2/2024     (3)   Patient will obtain 4/5 L knee flexion to indicate improved ability to walk, > 60 minutes   Initial, Not Met  7/2/2024       Long Term Goals: 6 weeks    Goal  Progress  Date    (1)  Patient will obtain 4+/5 L knee extension MMT to indicate improved ability to stand, > 60 minutes Initial, Not Met  7/2/2024   (2)   Patient will obtain L SLS average of 3 trials < 25" to indicate improved ability to step over obstacles in pathway   Initial, Not Met 7/2/2024    (3)   Patient will obtain >115 deg L knee flexion ACTIVE RANGE OF MOTION to indicate improved ability to squat to lift objects, floor - waist level  Initial, Not Met  7/2/2024       Plan     Plan of care Certification: 7/2/2024 to 9/2/2024.    Outpatient Physical Therapy 2 times weekly for 6 weeks to include the following interventions: Cervical/Lumbar Traction, Electrical Stimulation re-eval, dry needling, Gait Training, Iontophoresis (with ), Manual Therapy, Moist Heat/ Ice, Neuromuscular Re-ed, Patient Education, Self Care, Therapeutic Activities, and Therapeutic Exercise.     Physical therapist and physical therapy assistant(s) will met face to face to discuss patient's treatment plan and progress towards established goals. Pt will be seen by a physical therapist minimally every 6th visit or every 30 days.    Mirtha Sepulveda, PT  7/2/2024       I CERTIFY THE NEED FOR THESE SERVICES FURNISHED UNDER THIS PLAN OF TREATMENT AND WHILE UNDER MY CARE     Physician's comments:           Physician's Signature: ___________________________________________________    "

## 2024-07-03 NOTE — PLAN OF CARE
[facility]   Physical Therapy Initial Evaluation      Name: Rosangela Salazar  Clinic Number: [id number]    Therapy Diagnosis:   Encounter Diagnoses   Name Primary?    Plantar fasciitis of left foot Yes    Difficulty walking      Physician: Mattie Castro MD    Physician Orders: PT Eval and Treat   Medical Diagnosis from Referral: M72.2 (ICD-10-CM) - Plantar fasciitis of left foot  Evaluation Date: 7/2/2024  Authorization Period Expiration: 12/31/2024  Plan of Care Expiration: 9/2/2024  Visit # / Visits authorized: 1/ 1  #Visits based on PHYSICAL THERAPY POC:     Foto  Date  Score      Foot  Lower Score = Greater Disability   #1/3 7/2/2024 51.0   #2/3     #3/3       Time in  3:25pm   Time out 5:08pm   Total Appointment Time (timed & untimed codes) 103 minutes      Precautions: Standard, patient is now 5 years out from cancer treatment     Subjective     Date of injury: about a year ago   History of current condition - L foot pain started about a year ago, but has gotten progressively worse and is now impeding her ability to walk. Had treatment for R plantar fascia about 12 years ago with good results.     Lymphedema at L lower leg above knee to ankle level noted since having cancer treatment. She states that she has a referral for getting this treated if needed.      Prior medical treatment: none for L plantar fascia     History of falls/MVAs: patient slipped and fell on something wet recently, but denies significant injuries afterwards.     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      Occupation:      Imaging:  L knee x-ray noted is previous resection of midline to medial distal left femoral metadiaphysis with allograft reconstruction and fixation with 2 screws and single cerclage wire. There  is partial allograft fusion/integration to native femur without interval change since 09/18/2023 and increased since 12/19/2018. No hardware failure or loosening.    Social History: no stairs to enter home     Pain:      Current 0/10       Worst 6/10       Best  0/10     Location Bottom of L heel    Description  Intermittent, stabbing - like you are stepping on a knife     Aggravating factors Worse with standing, walking    Easing factors  Sitting down, getting weight off of it     Medical History:   Past Medical History:   Diagnosis Date    Allergy     seasonal    Bone cancer     Gall bladder inflammation     Mass of knee 3/29/2018    Soft tissue sarcoma     Tachycardia      Surgical History:   Rosangela Salazar  has a past surgical history that includes [place] tooth extraction; Colonoscopy (01/2013); Knee surgery; left femur open biopsy; tumor removal left leg; Bone graft (Left, 11/29/2018); and Colonoscopy (08/2021).    Medications:   Rosangela has a current medication list which includes the following prescription(s): alprazolam, azelastine, buspirone, calcium citrate-vitamin d3, cetirizine, dicyclomine, fluticasone propionate, lorazepam, low-ogestrel (28), metoprolol tartrate, montelukast, multivitamin, omeprazole, and psyllium.    Allergies:   Review of patient's allergies indicates:   Allergen Reactions    Ethanol (ethyl alcohol)     Zithromax [azithromycin] Nausea Only      Objective    7/2/2024:  Observation/posture:   non weight bearing: R arch slightly higher than L arch   Weight bearing: R arch slightly higher than L arch     Gait/assistive device:   Quad cane for longer walks - ex: hiking     Range of Motion: AROM:  Ankle Right  Left    Dorsiflexion 7 10   Plantarflexion 42 48   Inversion 34 29   Eversion 13 20     Knee  Right  Left    Flexion  128 111   Extension  -3 -3     Strength:  Ankle Right  Left    Dorsiflexion 4+/5 4+/5   Plantarflexion 4/5 4/5   Inversion 4+/5 4+/5   Eversion 4/5 4/5     Hip  Right   "Left    Flexion  4/5 4/5   Internal rotation  4/5 4/5   External rotation  4/5 4-/5     Knee  Right  Left    Flexion  4/5 4-/5   Extension  4+/5 3+/5     Joint Mobility:    Left   TCJ Hypomobile    STJ Hypomobile      Flexibility:    Left   Gastroc Restricted    Soleus Restricted    Plantar fascia  Restricted, knotting along tissues      Function/balance:    Right Left   Single limb stance 27", 23", 28" 30", 6", 18"      Edema:    Right Left   Ankle figure of eight 52.5 cm 55.0 cm   Ankle joint line 27.0 cm  28.0 cm   METs 24.0 cm 23.0 cm     Treatment   Total Treatment time (time-based codes) separate from Evaluation: 40 minutes    + indicates new exercise   Bold indicates completed exercise    therapeutic exercises to develop strength, endurance, ROM, flexibility, posture, and core stabilization for 25 minutes:  Patient education on nature of current condition and PHYSICAL THERAPY POC  Written HOME EXERCISE PROGRAM provided, reviewed, patient demo understanding   L soleus stretch, incline board, 30"x3   Initiate - R/L side lying clam shell   L knee LAQ, blue band at ankles, 3x10     manual therapy techniques: Joint mobilizations, Manual traction, Myofacial release, Soft tissue Mobilization, and Friction Massage for 15 minutes:  L STM Plantar Fascia, Achilles   L TCJ, STJ Gr III/IV JM    neuromuscular re-education activities to improve: Balance, Coordination, Kinesthetic, Sense, Proprioception, and Posture for 00 minutes:      therapeutic activities to improve functional performance for 00  minutes:      gait training to improve functional mobility and safety for 00  minutes:      Home Exercises and Patient Education Provided  Education provided:   - yes    Home Exercises Provided: yes.  Exercises were reviewed and Rosangela was able to demonstrate them prior to the end of the session.  Rosangela demonstrated good  understanding of the education provided.     Assessment     Rosangela is a 34 y.o. referred to outpatient " Physical Therapy with a medical diagnosis of M72.2 (ICD-10-CM) - Plantar fasciitis of left foot.     Pt presents with decreased ROM, muscle strength, flexibility, joint mobility. Increased pain, stiffness, soft tissue restriction. Impaired posture, joint mechanics, balance, gait pattern.     The patient's current task deficits include the following: limitation in ADL, self care, social/recreational tasks.     Patient prognosis: good  Rehab potential: good    Patient will benefit from skilled outpatient Physical Therapy to address the deficits stated above and in the chart below, provide patient /family education, to maximize patient's level of independence, and to address functional deficits.    Plan of care discussed with patient: Yes  Patient's spiritual, cultural and educational needs considered and patient is agreeable to the plan of care and goals as stated below:     Anticipated Barriers for therapy: chronicity of current injury and co morbidities     Medical Necessity is demonstrated by the following  History  Co-morbidities and personal factors that may impact the plan of care [] LOW: no personal factors / co-morbidities  [] MODERATE: 1-2 personal factors / co-morbidities  [x] HIGH: 3+ personal factors / co-morbidities    Moderate / High Support Documentation:   Co-morbidities affecting plan of care:    Allergy    seasonal    Bone cancer    Gall bladder inflammation    Mass of knee    Soft tissue sarcoma    Tachycardia     Personal Factors:   coping style  social background  lifestyle     Examination  Body Structures and Functions, activity limitations and participation restrictions that may impact the plan of care [] LOW: addressing 1-2 elements  [x] MODERATE: 3+ elements  [] HIGH: 4+ elements    Clinical Presentation [] LOW: stable  [x] MODERATE: Evolving  [] HIGH: Unstable     Decision Making/ Complexity Score: moderate       Goals:   Short Terms Goals: 3 weeks    Goal  Progress  Date    (1)  Patient will  "be I with HOME EXERCISE PROGRAM  Initial, Not Met 7/2/2024     (2)  Patient will obtain 4/5 L hip external rotation MMT to indicate improved ability to complete transitional movements   Initial, Not Met  7/2/2024     (3)   Patient will obtain 4/5 L knee flexion to indicate improved ability to walk, > 60 minutes   Initial, Not Met  7/2/2024       Long Term Goals: 6 weeks    Goal  Progress  Date    (1)  Patient will obtain 4+/5 L knee extension MMT to indicate improved ability to stand, > 60 minutes Initial, Not Met  7/2/2024   (2)   Patient will obtain L SLS average of 3 trials < 25" to indicate improved ability to step over obstacles in pathway   Initial, Not Met 7/2/2024    (3)   Patient will obtain >115 deg L knee flexion ACTIVE RANGE OF MOTION to indicate improved ability to squat to lift objects, floor - waist level  Initial, Not Met  7/2/2024       Plan     Plan of care Certification: 7/2/2024 to 9/2/2024.    Outpatient Physical Therapy 2 times weekly for 6 weeks to include the following interventions: Cervical/Lumbar Traction, Electrical Stimulation re-eval, dry needling, Gait Training, Iontophoresis (with ), Manual Therapy, Moist Heat/ Ice, Neuromuscular Re-ed, Patient Education, Self Care, Therapeutic Activities, and Therapeutic Exercise.     Physical therapist and physical therapy assistant(s) will met face to face to discuss patient's treatment plan and progress towards established goals. Pt will be seen by a physical therapist minimally every 6th visit or every 30 days.    Mirtha Sepulveda, PT  7/2/2024       I CERTIFY THE NEED FOR THESE SERVICES FURNISHED UNDER THIS PLAN OF TREATMENT AND WHILE UNDER MY CARE     Physician's comments:           Physician's Signature: ___________________________________________________    "

## 2024-07-09 ENCOUNTER — CLINICAL SUPPORT (OUTPATIENT)
Dept: REHABILITATION | Facility: HOSPITAL | Age: 34
End: 2024-07-09
Payer: COMMERCIAL

## 2024-07-09 DIAGNOSIS — R26.2 DIFFICULTY WALKING: ICD-10-CM

## 2024-07-09 DIAGNOSIS — M72.2 PLANTAR FASCIITIS OF LEFT FOOT: Primary | ICD-10-CM

## 2024-07-09 PROCEDURE — 97140 MANUAL THERAPY 1/> REGIONS: CPT | Mod: PN,CQ

## 2024-07-09 PROCEDURE — 97110 THERAPEUTIC EXERCISES: CPT | Mod: PN,CQ

## 2024-07-09 NOTE — PROGRESS NOTES
[facility]   Physical Therapy Treatment Note      Name: Rosangela Salazar  Clinic Number: [id number]    Therapy Diagnosis:   Encounter Diagnoses   Name Primary?    Plantar fasciitis of left foot Yes    Difficulty walking      Physician: Mattie Castro MD    Visit Date: 7/9/2024    Physician Orders: PT Eval and Treat   Medical Diagnosis from Referral: M72.2 (ICD-10-CM) - Plantar fasciitis of left foot  Evaluation Date: 7/2/2024  Authorization Period Expiration:  7/2/2025  Plan of Care Expiration: 9/2/2024  Visit # / Visits authorized: 1/ 20 +eval  #Visits based on PHYSICAL THERAPY POC:      Foto  Date  Score       Foot   Lower Score = Greater Disability   #1/3 7/2/2024 51.0   #2/3       #3/3          Time In: 1606   Time Out: 1647   Total Time: 41minutes  Total Billable Time: 41 minutes     Precautions: Standard, patient is now 5 years out from cancer treatment     PTA Visit #: 1/5       Subjective     Patient reports: no pain at present. Worst pain today was 4-5/10 with initial standing after prolonged sitting. Left quadriceps discomfort and soreness.   She was not compliant with home exercise program 2* discomfort after last session and holiday.  Response to previous treatment: discomfort and soreness left quad  Functional change: none stated    Pain: 0/10  Location: bilateral bottom of L heel     Current functional status  Limitation in walking, negotiating stairs,    Previous functional status  No running, jumping   Patient stated goal Improve walking tolerance for walking (particularly for work in [employer]).         Objective      Objective Measures updated at progress report unless specified.     Treatment     Rosangela received the treatments listed below:         + indicates new exercise   Bold indicates completed exercise     therapeutic exercises to develop strength, endurance, ROM, flexibility, posture, and core stabilization for 23 minutes:  Patient education on nature of current  "condition and PHYSICAL THERAPY POC  Written HOME EXERCISE PROGRAM provided, reviewed, patient demo understanding   L soleus stretch, incline board, 30"x3   +R/L side lying clam shell 2 x 10  +L knee LAQ, blue band at ankles, 3x10   +Left knee flexion, blue band 2 x 10     manual therapy techniques: Joint mobilizations, Manual traction, Myofacial release, Soft tissue Mobilization, and Friction Massage for 15 minutes:  L STM Plantar Fascia, Achilles   L TCJ, STJ Gr III/IV JM     neuromuscular re-education activities to improve: Balance, Coordination, Kinesthetic, Sense, Proprioception, and Posture for 03 minutes:   SLS left 3 x 30s     therapeutic activities to improve functional performance for 00 minutes:        gait training to improve functional mobility and safety for 00 minutes:       Patient Education and Home Exercises       Education provided:   - Educated pt that he/she may feel soreness after session.      Written Home Exercises Provided: yes.   Exercises were reviewed and Rosangela was able to demonstrate them prior to the end of the session.  Rosangela demonstrated good  understanding of the education provided. See Electronic Medical Record under Patient Instructions for exercises provided during therapy sessions    Assessment     Limited home exercise program compliance 2* soreness, but motivated and eager to progress. Planning to resume home exercise program. Progressed strengthening with good response without pain provocation. Manual cues for proper muscle activation with good correction. Educated pt that he/she may feel soreness after session.   Will benefit from continued physical therapy intervention to progress toward goals set forth in plan of care to improve functional mobility and quality of life.     Paste from evaluation: Rosangela is a 34 y.o. referred to outpatient Physical Therapy with a medical diagnosis of M72.2 (ICD-10-CM) - Plantar fasciitis of left foot.      Pt presents with decreased ROM, muscle " "strength, flexibility, joint mobility. Increased pain, stiffness, soft tissue restriction. Impaired posture, joint mechanics, balance, gait pattern.      The patient's current task deficits include the following: limitation in ADL, self care, social/recreational tasks.     Rosangela Is progressing well towards her goals.   Patient prognosis is Good.     Patient will continue to benefit from skilled outpatient physical therapy to address the deficits listed in the problem list box on initial evaluation, provide pt/family education and to maximize pt's level of independence in the home and community environment.     Patient's spiritual, cultural and educational needs considered and pt agreeable to plan of care and goals.     Anticipated barriers to physical therapy: chronicity of current injury and co morbidities     Goals:   Short Terms Goals: 3 weeks     Goal  Progress  Date    (1)  Patient will be I with HOME EXERCISE PROGRAM  ongoing 7/9/2024      (2)  Patient will obtain 4/5 L hip external rotation MMT to indicate improved ability to complete transitional movements   ongoing 7/9/2024      (3)   Patient will obtain 4/5 L knee flexion to indicate improved ability to walk, > 60 minutes  ongoing 7/9/2024           Long Term Goals: 6 weeks     Goal  Progress  Date    (1)  Patient will obtain 4+/5 L knee extension MMT to indicate improved ability to stand, > 60 minutes Ongoing   7/9/2024   (2)   Patient will obtain L SLS average of 3 trials < 25" to indicate improved ability to step over obstacles in pathway   ongoing 7/9/2024   (3)   Patient will obtain >115 deg L knee flexion ACTIVE RANGE OF MOTION to indicate improved ability to squat to lift objects, floor - waist level  ongoing 7/9/2024           Plan     Continue per POC, progressing as appropriate to achieve stated goals.    Continue with: Plan of care Certification: 7/2/2024 to 9/2/2024.  Outpatient Physical Therapy 2 times weekly for 6 weeks to include the " following interventions: Cervical/Lumbar Traction, Electrical Stimulation re-eval, dry needling, Gait Training, Iontophoresis (with ), Manual Therapy, Moist Heat/ Ice, Neuromuscular Re-ed, Patient Education, Self Care, Therapeutic Activities, and Therapeutic Exercise.      Physical therapist and physical therapy assistant(s) will met face to face to discuss patient's treatment plan and progress towards established goals. Pt will be seen by a physical therapist minimally every 6th visit or every 30 days.    Barb Pedro, PTA

## 2024-07-11 ENCOUNTER — CLINICAL SUPPORT (OUTPATIENT)
Dept: REHABILITATION | Facility: HOSPITAL | Age: 34
End: 2024-07-11
Payer: COMMERCIAL

## 2024-07-11 DIAGNOSIS — R26.2 DIFFICULTY WALKING: ICD-10-CM

## 2024-07-11 DIAGNOSIS — M72.2 PLANTAR FASCIITIS OF LEFT FOOT: Primary | ICD-10-CM

## 2024-07-11 PROCEDURE — 97112 NEUROMUSCULAR REEDUCATION: CPT | Mod: PN,CQ

## 2024-07-11 PROCEDURE — 97110 THERAPEUTIC EXERCISES: CPT | Mod: PN,CQ

## 2024-07-11 PROCEDURE — 97140 MANUAL THERAPY 1/> REGIONS: CPT | Mod: PN,CQ

## 2024-07-11 NOTE — PROGRESS NOTES
[facility]   Physical Therapy Treatment Note      Name: Rosangela Salazar  Clinic Number: [id number]    Therapy Diagnosis:   Encounter Diagnoses   Name Primary?    Plantar fasciitis of left foot Yes    Difficulty walking        Physician: Mattie Castro MD    Visit Date: 7/11/2024    Physician Orders: PT Eval and Treat   Medical Diagnosis from Referral: M72.2 (ICD-10-CM) - Plantar fasciitis of left foot  Evaluation Date: 7/2/2024  Authorization Period Expiration:  7/2/2025  Plan of Care Expiration: 9/2/2024  Visit # / Visits authorized: 2/ 20 +eval  #Visits based on PHYSICAL THERAPY POC:      Foto  Date  Score       Foot   Lower Score = Greater Disability   #1/3 7/2/2024 51.0   #2/3       #3/3          Time In: 1603   Time Out: 1704   Total Time: 61 minutes  Total Billable Time: 58 minutes     Precautions: Standard, patient is now 5 years out from cancer treatment     PTA Visit #: 2/5       Subjective     Patient reports: 3/10 right plantar heel at present, 7/10 at worst earlier today.     She was compliant with home exercise program.  Response to previous treatment: discomfort and soreness left quadriceps  Functional change: none stated    Pain: 3/10  Location: bilateral bottom of L heel     Current functional status  Limitation in walking, negotiating stairs,    Previous functional status  No running, jumping   Patient stated goal Improve walking tolerance for walking (particularly for work in [employer]).         Objective      Objective Measures updated at progress report unless specified.     Treatment     Rosangela received the treatments listed below:         + indicates new exercise   Bold indicates completed exercise     therapeutic exercises to develop strength, endurance, ROM, flexibility, posture, and core stabilization for 26 minutes:  Patient education on nature of current condition and PHYSICAL THERAPY POC  Written HOME EXERCISE PROGRAM provided, reviewed, patient demo  "understanding   L soleus stretch, incline board, 30"x3   R/L side lying clam shell, +yellow loop2 x 10  L knee LAQ, blue band at ankles, 3x10   Left knee flexion, blue band 2 x 10     manual therapy techniques: Joint mobilizations, Manual traction, Myofacial release, Soft tissue Mobilization, and Friction Massage for 20 minutes:  L STM Plantar Fascia, Achilles, +silicone cupping, +IASTM  NP-L TCJ, STJ Gr III/IV JM     neuromuscular re-education activities to improve: Balance, Coordination, Kinesthetic, Sense, Proprioception, and Posture for 12 minutes:   SLS each 3 x 30s  +Left arch curls x 20     therapeutic activities to improve functional performance for 00 minutes:        gait training to improve functional mobility and safety for 00 minutes:       Patient Education and Home Exercises       Education provided:   - Educated pt that he/she may feel soreness after session.      Written Home Exercises Provided: yes.   Exercises were reviewed and Rosangela was able to demonstrate them prior to the end of the session.  Rosangela demonstrated good  understanding of the education provided. See Electronic Medical Record under Patient Instructions for exercises provided during therapy sessions    Assessment     Continued pain which waxes and wanes. Able to progress with strengthening without pain provocation. Required moderate manual cues for proper form for arch curls with improved performance. Will benefit from continued physical therapy intervention to progress toward goals set forth in plan of care to improve functional mobility and quality of life.     Paste from evaluation: Rosangela is a 34 y.o. referred to outpatient Physical Therapy with a medical diagnosis of M72.2 (ICD-10-CM) - Plantar fasciitis of left foot.      Pt presents with decreased ROM, muscle strength, flexibility, joint mobility. Increased pain, stiffness, soft tissue restriction. Impaired posture, joint mechanics, balance, gait pattern.      The patient's " "current task deficits include the following: limitation in ADL, self care, social/recreational tasks.     Rosangela Is progressing well towards her goals.   Patient prognosis is Good.     Patient will continue to benefit from skilled outpatient physical therapy to address the deficits listed in the problem list box on initial evaluation, provide pt/family education and to maximize pt's level of independence in the home and community environment.     Patient's spiritual, cultural and educational needs considered and pt agreeable to plan of care and goals.     Anticipated barriers to physical therapy: chronicity of current injury and co morbidities     Goals:   Short Terms Goals: 3 weeks     Goal  Progress  Date    (1)  Patient will be I with HOME EXERCISE PROGRAM  ongoing 7/11/2024      (2)  Patient will obtain 4/5 L hip external rotation MMT to indicate improved ability to complete transitional movements   ongoing 7/11/2024      (3)   Patient will obtain 4/5 L knee flexion to indicate improved ability to walk, > 60 minutes  ongoing 7/11/2024           Long Term Goals: 6 weeks     Goal  Progress  Date    (1)  Patient will obtain 4+/5 L knee extension MMT to indicate improved ability to stand, > 60 minutes Ongoing   7/11/2024   (2)   Patient will obtain L SLS average of 3 trials < 25" to indicate improved ability to step over obstacles in pathway   ongoing 7/11/2024   (3)   Patient will obtain >115 deg L knee flexion ACTIVE RANGE OF MOTION to indicate improved ability to squat to lift objects, floor - waist level  ongoing 7/11/2024           Plan     Continue per POC, progressing as appropriate to achieve stated goals.    Continue with: Plan of care Certification: 7/2/2024 to 9/2/2024.  Outpatient Physical Therapy 2 times weekly for 6 weeks to include the following interventions: Cervical/Lumbar Traction, Electrical Stimulation re-eval, dry needling, Gait Training, Iontophoresis (with ), Manual Therapy, Moist Heat/ Ice, " Neuromuscular Re-ed, Patient Education, Self Care, Therapeutic Activities, and Therapeutic Exercise.      Physical therapist and physical therapy assistant(s) will met face to face to discuss patient's treatment plan and progress towards established goals. Pt will be seen by a physical therapist minimally every 6th visit or every 30 days.    Barb Pedro, PTA

## 2024-07-16 ENCOUNTER — CLINICAL SUPPORT (OUTPATIENT)
Dept: REHABILITATION | Facility: HOSPITAL | Age: 34
End: 2024-07-16
Payer: COMMERCIAL

## 2024-07-16 DIAGNOSIS — R26.2 DIFFICULTY WALKING: ICD-10-CM

## 2024-07-16 DIAGNOSIS — M72.2 PLANTAR FASCIITIS OF LEFT FOOT: Primary | ICD-10-CM

## 2024-07-16 PROCEDURE — 97110 THERAPEUTIC EXERCISES: CPT | Mod: PN

## 2024-07-16 PROCEDURE — 97140 MANUAL THERAPY 1/> REGIONS: CPT | Mod: PN

## 2024-07-16 PROCEDURE — 97112 NEUROMUSCULAR REEDUCATION: CPT | Mod: PN

## 2024-07-16 NOTE — PROGRESS NOTES
"[facility]   Physical Therapy Treatment Note      Name: Rosangela Salazar  [facility] Number: [id number]    Therapy Diagnosis:   Encounter Diagnoses   Name Primary?    Plantar fasciitis of left foot Yes    Difficulty walking      Physician: Mattie Castro MD    Visit Date: 7/16/2024    Physician Orders: PT Eval and Treat   Medical Diagnosis from Referral: M72.2 (ICD-10-CM) - Plantar fasciitis of left foot  Evaluation Date: 7/2/2024  Authorization Period Expiration:  7/2/2025  Plan of Care Expiration: 9/2/2024  Visit # / Visits authorized: 4/ 20  #Visits based on PHYSICAL THERAPY POC: 12     Foto  Date  Score       Foot   Lower Score = Greater Disability   #1/3 7/2/2024 51.0   #2/3       #3/3          Time In: 4:05pm  Time Out: 5:20pm  Total Time: 75 minutes     Precautions: Standard, patient is now 5 years out from cancer treatment     PTA Visit #: 2/5     Subjective   HOME EXERCISE PROGRAM: reviewed, reports compliance   Response to previous treatment: feels continued symptoms at bottom of L heel during the day (upon standing after sitting and at night)   Function: continues with limitation in walking due to current condition     Pain: 3/10  Location: bilateral bottom of L heel     Current functional status  Limitation in walking, negotiating stairs,    Previous functional status  No running, jumping   Patient stated goal Improve walking tolerance for walking (particularly for work in [employer]).         Objective      Objective Measures updated at progress report unless specified.     Treatment     + indicates new exercise   Bold indicates completed exercise     therapeutic exercises to develop strength, endurance, ROM, flexibility, posture, and core stabilization for 53 minutes:  L soleus stretch, incline board, 30"x3   R/L side lying clam shell, yellow loop, 3x 10  L knee LAQ, blue band at ankles, 3x10   Left knee flexion, blue band 2 x 10  +dry needling consent form reviewed, signed, all " "questions answered - patient signed consent form and discussed with PT  the option of doing dry needling once her provider gives approval   +electronic HOME EXERCISE PROGRAM updated, reviewed, patient demo understanding - L plantar fascia stretch      manual therapy techniques: Joint mobilizations, Manual traction, Myofacial release, Soft tissue Mobilization, and Friction Massage for 10 minutes:  L STM Plantar Fascia, Achilles decompression cupping along L Achilles, STM using firm tool at L plantar fascia   L TCJ, STJ Gr III/IV JM     neuromuscular re-education activities to improve: Balance, Coordination, Kinesthetic, Sense, Proprioception, and Posture for 12 minutes:  R/L SLS airex mat, 30"x 3   B arch curls 3" hold, 3x10     therapeutic activities to improve functional performance for 00 minutes:        gait training to improve functional mobility and safety for 00 minutes:       Patient Education and Home Exercises       Education provided:   - Educated pt that he/she may feel soreness after session.       Home Exercises Provided: yes.   Exercises were reviewed and Rosangela was able to demonstrate them prior to the end of the session.  Rosangela demonstrated good  understanding of the education provided. See Electronic Medical Record under Patient Instructions for exercises provided during therapy sessions    Assessment   L heel (stretching) pain reproduced during L plantar fascia stretch; electronic HOME EXERCISE PROGRAM updated to include this stretch to minimize localized tightness at L plantar fascia. Decompression cupping along L achilles and STM using firm tool completed to optimize length of tissues. Improved intrinsic foot muscle activation during arch doming in standing today.      Pt presents with decreased ROM, muscle strength, flexibility, joint mobility. Increased pain, stiffness, soft tissue restriction. Impaired posture, joint mechanics, balance, gait pattern.      The patient's current task deficits " "include the following: limitation in ADL, self care, social/recreational tasks.     Rosangela is making fair progress towards meeting set goals.   Patient prognosis is Good.     Patient will continue to benefit from skilled outpatient physical therapy to address the deficits listed in the problem list box on initial evaluation, provide pt/family education and to maximize pt's level of independence in the home and community environment.      Anticipated barriers to physical therapy: chronicity of current injury and co morbidities     Goals:   Short Terms Goals: 3 weeks     Goal  Progress  Date    (1)  Patient will be I with HOME EXERCISE PROGRAM  ongoing 7/16/2024      (2)  Patient will obtain 4/5 L hip external rotation MMT to indicate improved ability to complete transitional movements   ongoing 7/16/2024      (3)   Patient will obtain 4/5 L knee flexion to indicate improved ability to walk, > 60 minutes  ongoing 7/16/2024           Long Term Goals: 6 weeks     Goal  Progress  Date    (1)  Patient will obtain 4+/5 L knee extension MMT to indicate improved ability to stand, > 60 minutes Ongoing   7/16/2024   (2)   Patient will obtain L SLS average of 3 trials < 25" to indicate improved ability to step over obstacles in pathway   ongoing 7/16/2024   (3)   Patient will obtain >115 deg L knee flexion ACTIVE RANGE OF MOTION to indicate improved ability to squat to lift objects, floor - waist level  ongoing 7/16/2024           Plan   Plan of care Certification: 7/2/2024 to 9/2/2024.    Outpatient Physical Therapy 2 times weekly for 6 weeks to include the following interventions: Cervical/Lumbar Traction, Electrical Stimulation re-eval, dry needling, Gait Training, Iontophoresis (with ), Manual Therapy, Moist Heat/ Ice, Neuromuscular Re-ed, Patient Education, Self Care, Therapeutic Activities, and Therapeutic Exercise.      Physical therapist and physical therapy assistant(s) will met face to face to discuss patient's " treatment plan and progress towards established goals. Pt will be seen by a physical therapist minimally every 6th visit or every 30 days.    Mirtha Sepulveda, PT

## 2024-07-18 ENCOUNTER — CLINICAL SUPPORT (OUTPATIENT)
Dept: REHABILITATION | Facility: HOSPITAL | Age: 34
End: 2024-07-18
Payer: COMMERCIAL

## 2024-07-18 DIAGNOSIS — M72.2 PLANTAR FASCIITIS OF LEFT FOOT: Primary | ICD-10-CM

## 2024-07-18 DIAGNOSIS — R26.2 DIFFICULTY WALKING: ICD-10-CM

## 2024-07-18 PROCEDURE — 97112 NEUROMUSCULAR REEDUCATION: CPT | Mod: PN,CQ

## 2024-07-18 PROCEDURE — 97110 THERAPEUTIC EXERCISES: CPT | Mod: PN,CQ

## 2024-07-18 PROCEDURE — 97140 MANUAL THERAPY 1/> REGIONS: CPT | Mod: PN,CQ

## 2024-07-18 NOTE — PROGRESS NOTES
"[facility]   Physical Therapy Treatment Note      Name: Rosangela Salazar  Clinic Number: [id number]    Therapy Diagnosis:   Encounter Diagnoses   Name Primary?    Plantar fasciitis of left foot Yes    Difficulty walking        Physician: Mattie Castro MD    Visit Date: 7/18/2024    Physician Orders: PT Eval and Treat   Medical Diagnosis from Referral: M72.2 (ICD-10-CM) - Plantar fasciitis of left foot  Evaluation Date: 7/2/2024  Authorization Period Expiration:  7/2/2025  Plan of Care Expiration: 9/2/2024  Visit # / Visits authorized: 5/ 20  #Visits based on PHYSICAL THERAPY POC: 12     Foto  Date  Score       Foot   Lower Score = Greater Disability   #1/3 7/2/2024 51.0   #2/3 7/18/2024 51.0    #3/3          Time In: 1611   Time Out: 1658   Total Time: 47 minutes  Total Billable Time: 47 minutes     Precautions: Standard, patient is now 5 years out from cancer treatment     PTA Visit #: 1/5     Subjective   HOME EXERCISE PROGRAM: reviewed, reports compliance   Response to previous treatment: Had increased achilles pain but is now at baseline. Feels continued symptoms at bottom of L heel during the day (upon standing after sitting and at night)   Function: continues with limitation in walking due to current condition     Pain: 3-4/10  Location: bilateral bottom of L heel     Current functional status  Limitation in walking, negotiating stairs,    Previous functional status  No running, jumping   Patient stated goal Improve walking tolerance for walking (particularly for work in [employer]).         Objective      Objective Measures updated at progress report unless specified.     Treatment     + indicates new exercise   Bold indicates completed exercise     therapeutic exercises to develop strength, endurance, ROM, flexibility, posture, and core stabilization for 19 minutes:  L soleus stretch, incline board, 30"x3   R/L side lying clam shell, yellow loop, 3x 10  L knee LAQ, blue band at " "ankles, 3x10   Left knee flexion, blue band 2 x 10  +Standing plantar fascia stretch 3 x 30s  dry needling consent form reviewed, signed, all questions answered - patient signed consent form and discussed with PT  the option of doing dry needling once her provider gives approval   electronic HOME EXERCISE PROGRAM updated, reviewed, patient demo understanding - L plantar fascia stretch      manual therapy techniques: Joint mobilizations, Manual traction, Myofacial release, Soft tissue Mobilization, and Friction Massage for 10 minutes:  L STM Plantar Fascia, Achilles decompression cupping along L Achilles, STM using firm tool at L plantar fascia   L TCJ, STJ Gr III/IV JM     neuromuscular re-education activities to improve: Balance, Coordination, Kinesthetic, Sense, Proprioception, and Posture for 18 minutes:  R/L SLS airex mat, 30"x 3   B arch curls 3" hold, 3x10  +Tandem stance on  foam 2 x 30s each  +Minisquats 2 x 10 with manual cues for symmetrical WB      therapeutic activities to improve functional performance for 00 minutes:        gait training to improve functional mobility and safety for 00 minutes:       Patient Education and Home Exercises       Education provided:   - Educated pt that he/she may feel soreness after session.       Home Exercises Provided:  Instructed patient to continue current home exercise program.  Exercises were reviewed and Rosangela was able to demonstrate them prior to the end of the session.  Rosangela demonstrated good  understanding of the education provided. See Electronic Medical Record under Patient Instructions for exercises provided during therapy sessions    Assessment     Discomfort after last session but returned to baseline today. Progressed strengthening with cues for proper form. Manual cues for proper weight shifting with minisquats. Cues for increased doming with standing activities with good correction but limited endurance with holds.      Pt presents with decreased ROM, " "muscle strength, flexibility, joint mobility. Increased pain, stiffness, soft tissue restriction. Impaired posture, joint mechanics, balance, gait pattern.      The patient's current task deficits include the following: limitation in ADL, self care, social/recreational tasks.     Rosangela is making fair progress towards meeting set goals.   Patient prognosis is Good.     Patient will continue to benefit from skilled outpatient physical therapy to address the deficits listed in the problem list box on initial evaluation, provide pt/family education and to maximize pt's level of independence in the home and community environment.      Anticipated barriers to physical therapy: chronicity of current injury and co morbidities     Goals:   Short Terms Goals: 3 weeks     Goal  Progress  Date    (1)  Patient will be I with HOME EXERCISE PROGRAM  ongoing 7/18/2024      (2)  Patient will obtain 4/5 L hip external rotation MMT to indicate improved ability to complete transitional movements   ongoing 7/18/2024      (3)   Patient will obtain 4/5 L knee flexion to indicate improved ability to walk, > 60 minutes  ongoing 7/18/2024           Long Term Goals: 6 weeks     Goal  Progress  Date    (1)  Patient will obtain 4+/5 L knee extension MMT to indicate improved ability to stand, > 60 minutes Ongoing   7/18/2024   (2)   Patient will obtain L SLS average of 3 trials < 25" to indicate improved ability to step over obstacles in pathway   ongoing 7/18/2024   (3)   Patient will obtain >115 deg L knee flexion ACTIVE RANGE OF MOTION to indicate improved ability to squat to lift objects, floor - waist level  ongoing 7/18/2024           Plan   Plan of care Certification: 7/2/2024 to 9/2/2024.    Outpatient Physical Therapy 2 times weekly for 6 weeks to include the following interventions: Cervical/Lumbar Traction, Electrical Stimulation re-eval, dry needling, Gait Training, Iontophoresis (with ), Manual Therapy, Moist Heat/ Ice, " Neuromuscular Re-ed, Patient Education, Self Care, Therapeutic Activities, and Therapeutic Exercise.      Physical therapist and physical therapy assistant(s) will met face to face to discuss patient's treatment plan and progress towards established goals. Pt will be seen by a physical therapist minimally every 6th visit or every 30 days.    Barb Pedro, PTA

## 2024-07-22 RX ORDER — AZELASTINE 1 MG/ML
1 SPRAY, METERED NASAL 2 TIMES DAILY
Qty: 90 ML | Refills: 3 | Status: SHIPPED | OUTPATIENT
Start: 2024-07-22

## 2024-07-23 NOTE — PROGRESS NOTES
"[facility]   Physical Therapy Treatment Note      Name: Rosangela Salazar  Clinic Number: [id number]    Therapy Diagnosis:   Encounter Diagnoses   Name Primary?    Plantar fasciitis of left foot Yes    Difficulty walking        Physician: Mattie Castro MD    Visit Date: 7/24/2024    Physician Orders: PT Eval and Treat   Medical Diagnosis from Referral: M72.2 (ICD-10-CM) - Plantar fasciitis of left foot  Evaluation Date: 7/2/2024  Authorization Period Expiration:  7/2/2025  Plan of Care Expiration: 9/2/2024  Visit # / Visits authorized: 6/ 20  #Visits based on PHYSICAL THERAPY POC: 12     Foto  Date  Score       Foot   Lower Score = Greater Disability   #1/3 7/2/2024 51.0   #2/3 7/18/2024 51.0    #3/3          Time In: 1321   Time Out: 1605   Total Time: 44 minutes  Total Billable Time: 44 minutes     Precautions: Standard, patient is now 5 years out from cancer treatment     PTA Visit #: 2/5     Subjective   HOME EXERCISE PROGRAM: reviewed, reports compliance   Response to previous treatment: Had increased achilles pain but is now at baseline. Feels continued symptoms at bottom of L heel during the day (upon standing after sitting and at night)   Function: continues with limitation in walking due to current condition     Migraine today.     Pain: 3/10  Location: bilateral bottom of L heel     Current functional status  Limitation in walking, negotiating stairs,    Previous functional status  No running, jumping   Patient stated goal Improve walking tolerance for walking (particularly for work in [employer]).         Objective      Objective Measures updated at progress report unless specified.     Treatment     + indicates new exercise   Bold indicates completed exercise     therapeutic exercises to develop strength, endurance, ROM, flexibility, posture, and core stabilization for 16 minutes:  L soleus stretch, incline board, 30"x3   R/L side lying clam shell, yellow loop, 3x 10  L knee " "LAQ, blue band at ankle, 3x10   Left knee flexion, blue band 2 x 10  Standing plantar fascia stretch 3 x 30s  dry needling consent form reviewed, signed, all questions answered - patient signed consent form and discussed with PT  the option of doing dry needling once her provider gives approval   electronic HOME EXERCISE PROGRAM updated, reviewed, patient demo understanding - L plantar fascia stretch      manual therapy techniques: Joint mobilizations, Manual traction, Myofacial release, Soft tissue Mobilization, and Friction Massage for 10 minutes:  L STM Plantar Fascia, Achilles decompression cupping along L Achilles, STM using firm tool at L plantar fascia   L TCJ, STJ Gr III/IV JM     neuromuscular re-education activities to improve: Balance, Coordination, Kinesthetic, Sense, Proprioception, and Posture for 18 minutes:  R/L SLS airex mat, 30"x 3   B arch curls 3" hold, +standing on foam 3x10  Tandem stance on  foam 2 x 30s each  Minisquats 2 x 10 with manual cues for symmetrical WB   +Modified deadlift with 2# bar x 15 with cues for proper weight shifting and muscle activation     therapeutic activities to improve functional performance for 00 minutes:        gait training to improve functional mobility and safety for 00 minutes:       Patient Education and Home Exercises       Education provided:   - Educated pt that he/she may feel soreness after session.       Home Exercises Provided:  Instructed patient to continue current home exercise program.  Exercises were reviewed and Rosangela was able to demonstrate them prior to the end of the session.  Rosangela demonstrated good  understanding of the education provided. See Electronic Medical Record under Patient Instructions for exercises provided during therapy sessions    Assessment     Slower pacing 2* migraine, but able to progress strengthening with cues for proper muscle activation with standing doming on foam and weight shifting with modified deadlifts to prevent " "excessive anterior tibial translation over LINDA. Educated pt that he/she may feel soreness after session.       Pt presents with decreased ROM, muscle strength, flexibility, joint mobility. Increased pain, stiffness, soft tissue restriction. Impaired posture, joint mechanics, balance, gait pattern.      The patient's current task deficits include the following: limitation in ADL, self care, social/recreational tasks.     Rosangela is making fair progress towards meeting set goals.   Patient prognosis is Good.     Patient will continue to benefit from skilled outpatient physical therapy to address the deficits listed in the problem list box on initial evaluation, provide pt/family education and to maximize pt's level of independence in the home and community environment.      Anticipated barriers to physical therapy: chronicity of current injury and co morbidities     Goals:   Short Terms Goals: 3 weeks     Goal  Progress  Date    (1)  Patient will be I with HOME EXERCISE PROGRAM  ongoing 7/24/2024      (2)  Patient will obtain 4/5 L hip external rotation MMT to indicate improved ability to complete transitional movements   ongoing 7/24/2024      (3)   Patient will obtain 4/5 L knee flexion to indicate improved ability to walk, > 60 minutes  ongoing 7/24/2024           Long Term Goals: 6 weeks     Goal  Progress  Date    (1)  Patient will obtain 4+/5 L knee extension MMT to indicate improved ability to stand, > 60 minutes Ongoing   7/24/2024   (2)   Patient will obtain L SLS average of 3 trials < 25" to indicate improved ability to step over obstacles in pathway   ongoing 7/24/2024   (3)   Patient will obtain >115 deg L knee flexion ACTIVE RANGE OF MOTION to indicate improved ability to squat to lift objects, floor - waist level  ongoing 7/24/2024           Plan   Plan of care Certification: 7/2/2024 to 9/2/2024.    Outpatient Physical Therapy 2 times weekly for 6 weeks to include the following interventions: " Cervical/Lumbar Traction, Electrical Stimulation re-eval, dry needling, Gait Training, Iontophoresis (with ), Manual Therapy, Moist Heat/ Ice, Neuromuscular Re-ed, Patient Education, Self Care, Therapeutic Activities, and Therapeutic Exercise.      Physical therapist and physical therapy assistant(s) will met face to face to discuss patient's treatment plan and progress towards established goals. Pt will be seen by a physical therapist minimally every 6th visit or every 30 days.    Barb Pedro, PTA

## 2024-07-24 ENCOUNTER — CLINICAL SUPPORT (OUTPATIENT)
Dept: REHABILITATION | Facility: HOSPITAL | Age: 34
End: 2024-07-24
Payer: COMMERCIAL

## 2024-07-24 DIAGNOSIS — M72.2 PLANTAR FASCIITIS OF LEFT FOOT: Primary | ICD-10-CM

## 2024-07-24 DIAGNOSIS — R26.2 DIFFICULTY WALKING: ICD-10-CM

## 2024-07-24 PROCEDURE — 97140 MANUAL THERAPY 1/> REGIONS: CPT | Mod: PN,CQ

## 2024-07-24 PROCEDURE — 97112 NEUROMUSCULAR REEDUCATION: CPT | Mod: PN,CQ

## 2024-07-24 PROCEDURE — 97110 THERAPEUTIC EXERCISES: CPT | Mod: PN,CQ

## 2024-07-30 ENCOUNTER — CLINICAL SUPPORT (OUTPATIENT)
Dept: REHABILITATION | Facility: HOSPITAL | Age: 34
End: 2024-07-30
Payer: COMMERCIAL

## 2024-07-30 DIAGNOSIS — M72.2 PLANTAR FASCIITIS OF LEFT FOOT: Primary | ICD-10-CM

## 2024-07-30 DIAGNOSIS — R26.2 DIFFICULTY WALKING: ICD-10-CM

## 2024-07-30 PROCEDURE — 97140 MANUAL THERAPY 1/> REGIONS: CPT | Mod: PN,CQ

## 2024-07-30 PROCEDURE — 97112 NEUROMUSCULAR REEDUCATION: CPT | Mod: PN,CQ

## 2024-07-30 PROCEDURE — 97530 THERAPEUTIC ACTIVITIES: CPT | Mod: PN,CQ

## 2024-07-30 PROCEDURE — 97110 THERAPEUTIC EXERCISES: CPT | Mod: PN,CQ

## 2024-07-30 NOTE — PROGRESS NOTES
"[facility]   Physical Therapy Treatment Note      Name: Rosangela Salazar  Clinic Number: [id number]    Therapy Diagnosis:   Encounter Diagnoses   Name Primary?    Plantar fasciitis of left foot Yes    Difficulty walking          Physician: Mattie Castro MD    Visit Date: 7/30/2024    Physician Orders: PT Eval and Treat   Medical Diagnosis from Referral: M72.2 (ICD-10-CM) - Plantar fasciitis of left foot  Evaluation Date: 7/2/2024  Authorization Period Expiration:  7/2/2025  Plan of Care Expiration: 9/2/2024  Visit # / Visits authorized: 7/ 20  #Visits based on PHYSICAL THERAPY POC: 12     Foto  Date  Score       Foot   Lower Score = Greater Disability   #1/3 7/2/2024 51.0   #2/3 7/18/2024 51.0    #3/3          Time In: 1647   Time Out: 1648   Total Time: 61 minutes  Total Billable Time: 56 minutes     Precautions: Standard, patient is now 5 years out from cancer treatment     PTA Visit #: 3/5     Subjective   HOME EXERCISE PROGRAM: reviewed, reports compliance. Difficulty with knee flexion with band without A  Response to previous treatment: improved pain  Didn't bother her as much when waking up and performing ADL's.   Function: continues with limitation in walking due to current condition     Pain: 0/10  Location: bilateral bottom of L heel     Current functional status  Limitation in walking, negotiating stairs,    Previous functional status  No running, jumping   Patient stated goal Improve walking tolerance for walking (particularly for work in "[employer]ing).         Objective      Objective Measures updated at progress report unless specified.     Treatment     + indicates new exercise   Bold indicates completed exercise     therapeutic exercises to develop strength, endurance, ROM, flexibility, posture, and core stabilization for 16 minutes:  L soleus stretch, incline board, 30"x3   R/L side lying clam shell, +green loop, 3x 10  L knee LAQ, blue band at ankle, 3x10   Left knee " "flexion, blue band 2 x 10  Standing plantar fascia stretch 3 x 30s  +Prone hamstring curl, left, blue band 1 x 15-hypersensitivity on knee at end  dry needling consent form reviewed, signed, all questions answered - patient signed consent form and discussed with PT  the option of doing dry needling once her provider gives approval   electronic HOME EXERCISE PROGRAM updated, reviewed, patient demo understanding - L plantar fascia stretch      manual therapy techniques: Joint mobilizations, Manual traction, Myofacial release, Soft tissue Mobilization, and Friction Massage for 12 minutes:  L STM Plantar Fascia, Achilles decompression cupping along L Achilles, STM using firm tool at L plantar fascia   L TCJ, STJ Gr III/IV JM     neuromuscular re-education activities to improve: Balance, Coordination, Kinesthetic, Sense, Proprioception, and Posture for 13 minutes:  R/L SLS airex mat, 30"x 3   B arch curls 3" hold, standing on foam 3x10  Tandem stance on  foam 2 x 30s each  Minisquats. +airex, +3 x 10 with manual cues for symmetrical WB        therapeutic activities to improve functional performance for 15 minutes:   Modified deadlift with +3# bar x +20 with cues for proper weight shifting and muscle activation  +Sit<>stands from 18" box +2 airex x 15     gait training to improve functional mobility and safety for 00 minutes:       Patient Education and Home Exercises       Education provided:   - Educated pt that he/she may feel soreness after session.       Home Exercises Provided:  Yes, added to current home exercise program.   Exercises were reviewed and Rosangela was able to demonstrate them prior to the end of the session.  Rosangela demonstrated good  understanding of the education provided. See Electronic Medical Record under Patient Instructions for exercises provided during therapy sessions    Assessment     Improved pain with ADL's in the morning. Progressed strengthening with good response with manual and verbal cues " "for proper form. No pain provocation, but did feel tenderness over left knee at end of prone hamstring curls 2* sensory issues per pt report, which resolved after stopping.  Will benefit from continued physical therapy intervention to progress toward goals set forth in plan of care to improve functional mobility and quality of life.       Pt presents with decreased ROM, muscle strength, flexibility, joint mobility. Increased pain, stiffness, soft tissue restriction. Impaired posture, joint mechanics, balance, gait pattern.      The patient's current task deficits include the following: limitation in ADL, self care, social/recreational tasks.     Rosangela is making fair progress towards meeting set goals.   Patient prognosis is Good.     Patient will continue to benefit from skilled outpatient physical therapy to address the deficits listed in the problem list box on initial evaluation, provide pt/family education and to maximize pt's level of independence in the home and community environment.      Anticipated barriers to physical therapy: chronicity of current injury and co morbidities     Goals:   Short Terms Goals: 3 weeks     Goal  Progress  Date    (1)  Patient will be I with HOME EXERCISE PROGRAM  ongoing 7/30/2024      (2)  Patient will obtain 4/5 L hip external rotation MMT to indicate improved ability to complete transitional movements   ongoing 7/30/2024      (3)   Patient will obtain 4/5 L knee flexion to indicate improved ability to walk, > 60 minutes  ongoing 7/30/2024           Long Term Goals: 6 weeks     Goal  Progress  Date    (1)  Patient will obtain 4+/5 L knee extension MMT to indicate improved ability to stand, > 60 minutes Ongoing   7/30/2024   (2)   Patient will obtain L SLS average of 3 trials < 25" to indicate improved ability to step over obstacles in pathway   ongoing 7/30/2024   (3)   Patient will obtain >115 deg L knee flexion ACTIVE RANGE OF MOTION to indicate improved ability to squat to " lift objects, floor - waist level  ongoing 7/30/2024           Plan   Plan of care Certification: 7/2/2024 to 9/2/2024.    Outpatient Physical Therapy 2 times weekly for 6 weeks to include the following interventions: Cervical/Lumbar Traction, Electrical Stimulation re-eval, dry needling, Gait Training, Iontophoresis (with ), Manual Therapy, Moist Heat/ Ice, Neuromuscular Re-ed, Patient Education, Self Care, Therapeutic Activities, and Therapeutic Exercise.      Physical therapist and physical therapy assistant(s) will met face to face to discuss patient's treatment plan and progress towards established goals. Pt will be seen by a physical therapist minimally every 6th visit or every 30 days.    Barb Pedro, PTA

## 2024-08-01 ENCOUNTER — OFFICE VISIT (OUTPATIENT)
Dept: NEUROLOGY | Facility: CLINIC | Age: 34
End: 2024-08-01
Payer: COMMERCIAL

## 2024-08-01 ENCOUNTER — CLINICAL SUPPORT (OUTPATIENT)
Dept: REHABILITATION | Facility: HOSPITAL | Age: 34
End: 2024-08-01
Payer: COMMERCIAL

## 2024-08-01 VITALS
HEIGHT: 67 IN | TEMPERATURE: 97 F | BODY MASS INDEX: 37.99 KG/M2 | WEIGHT: 242.06 LBS | DIASTOLIC BLOOD PRESSURE: 79 MMHG | HEART RATE: 83 BPM | RESPIRATION RATE: 17 BRPM | SYSTOLIC BLOOD PRESSURE: 113 MMHG

## 2024-08-01 DIAGNOSIS — G43.829 MENSTRUAL MIGRAINE WITHOUT STATUS MIGRAINOSUS, NOT INTRACTABLE: ICD-10-CM

## 2024-08-01 DIAGNOSIS — G43.019 HEADACHE, COMMON MIGRAINE, INTRACTABLE: ICD-10-CM

## 2024-08-01 DIAGNOSIS — F41.9 ANXIETY: ICD-10-CM

## 2024-08-01 DIAGNOSIS — Z85.831 HISTORY OF SARCOMA: ICD-10-CM

## 2024-08-01 DIAGNOSIS — H53.9 VISUAL DISTURBANCE: Primary | ICD-10-CM

## 2024-08-01 DIAGNOSIS — R26.2 DIFFICULTY WALKING: ICD-10-CM

## 2024-08-01 DIAGNOSIS — M72.2 PLANTAR FASCIITIS OF LEFT FOOT: Primary | ICD-10-CM

## 2024-08-01 PROCEDURE — 97530 THERAPEUTIC ACTIVITIES: CPT | Mod: PN,CQ

## 2024-08-01 PROCEDURE — 1159F MED LIST DOCD IN RCRD: CPT | Mod: CPTII,S$GLB,, | Performed by: PSYCHIATRY & NEUROLOGY

## 2024-08-01 PROCEDURE — 3074F SYST BP LT 130 MM HG: CPT | Mod: CPTII,S$GLB,, | Performed by: PSYCHIATRY & NEUROLOGY

## 2024-08-01 PROCEDURE — 3008F BODY MASS INDEX DOCD: CPT | Mod: CPTII,S$GLB,, | Performed by: PSYCHIATRY & NEUROLOGY

## 2024-08-01 PROCEDURE — 97112 NEUROMUSCULAR REEDUCATION: CPT | Mod: PN,CQ

## 2024-08-01 PROCEDURE — 3078F DIAST BP <80 MM HG: CPT | Mod: CPTII,S$GLB,, | Performed by: PSYCHIATRY & NEUROLOGY

## 2024-08-01 PROCEDURE — 99999 PR PBB SHADOW E&M-EST. PATIENT-LVL V: CPT | Mod: PBBFAC,,, | Performed by: PSYCHIATRY & NEUROLOGY

## 2024-08-01 PROCEDURE — 99205 OFFICE O/P NEW HI 60 MIN: CPT | Mod: S$GLB,,, | Performed by: PSYCHIATRY & NEUROLOGY

## 2024-08-01 PROCEDURE — 97140 MANUAL THERAPY 1/> REGIONS: CPT | Mod: PN,CQ

## 2024-08-01 PROCEDURE — 97110 THERAPEUTIC EXERCISES: CPT | Mod: PN,CQ

## 2024-08-01 RX ORDER — SUMATRIPTAN 50 MG/1
TABLET, FILM COATED ORAL
Qty: 9 TABLET | Refills: 3 | Status: SHIPPED | OUTPATIENT
Start: 2024-08-01

## 2024-08-01 NOTE — PROGRESS NOTES
"[facility]   Physical Therapy Treatment Note      Name: Rosangela Salazar  Clinic Number: [id number]    Therapy Diagnosis:   Encounter Diagnoses   Name Primary?    Plantar fasciitis of left foot Yes    Difficulty walking        Physician: Mattie Castro MD    Visit Date: 8/1/2024    Physician Orders: PT Eval and Treat   Medical Diagnosis from Referral: M72.2 (ICD-10-CM) - Plantar fasciitis of left foot  Evaluation Date: 7/2/2024  Authorization Period Expiration:  7/2/2025  Plan of Care Expiration: 9/2/2024  Visit # / Visits authorized: 8/ 20  #Visits based on PHYSICAL THERAPY POC: 12     Foto  Date  Score       Foot   Lower Score = Greater Disability   #1/3 7/2/2024 51.0   #2/3 7/18/2024 51.0    #3/3          Time In: 1620  Time Out: 1720   Total Time: 60 minutes  Total Billable Time: 60 minutes     Precautions: Standard, patient is now 5 years out from cancer treatment     PTA Visit #: 4/5     Subjective   HOME EXERCISE PROGRAM: reviewed, reports compliance. Difficulty with knee flexion with band without A  Response to previous treatment: Saw neuro today and is anxious re: testing for rule out of MS. No pain or soreness after last session.   Function: continues with limitation in walking due to current condition     Pain: 0/10  Location: bilateral bottom of L heel     Current functional status  Limitation in walking, negotiating stairs,    Previous functional status  No running, jumping   Patient stated goal Improve walking tolerance for walking (particularly for work in [employer]).         Objective      Objective Measures updated at progress report unless specified.     Treatment     + indicates new exercise   Bold indicates completed exercise     therapeutic exercises to develop strength, endurance, ROM, flexibility, posture, and core stabilization for 23 minutes:  L soleus stretch, incline board, 30"x3   R/L side lying clam shell, +blue loop, 3x 10  L knee LAQ, blue band at ankle, " "3x10   Left knee flexion, blue band 2 x 10  Standing plantar fascia stretch 3 x 30s  Prone hamstring curl, left, blue band 1 x 15-hypersensitivity on knee at end  dry needling consent form reviewed, signed, all questions answered - patient signed consent form and discussed with PT  the option of doing dry needling once her provider gives approval   electronic HOME EXERCISE PROGRAM updated, reviewed, patient demo understanding - L plantar fascia stretch   +4 way ankle left, red band 2 x 10     manual therapy techniques: Joint mobilizations, Manual traction, Myofacial release, Soft tissue Mobilization, and Friction Massage for 12 minutes:  L STM Plantar Fascia, Achilles decompression cupping along L Achilles, STM using firm tool at L plantar fascia   L TCJ, STJ Gr III/IV JM     neuromuscular re-education activities to improve: Balance, Coordination, Kinesthetic, Sense, Proprioception, and Posture for 12 minutes:  R/L SLS airex mat, 30"x 3 -stopped 2* pain  B arch curls 3" hold, standing on foam 3x10-seated today  Tandem stance on  foam 2 x 30s each  Minisquats. airex, +3 x 10 with manual cues for symmetrical WB        therapeutic activities to improve functional performance for 13 minutes:   Modified deadlift with +3# bar x +20 with cues for proper weight shifting and muscle activation  Sit<>stands from 18" box +2 airex x +20     gait training to improve functional mobility and safety for 00 minutes:       Patient Education and Home Exercises       Education provided:   - Educated pt that he/she may feel soreness after session.       Home Exercises Provided:  Yes, added to current home exercise program. Issued red band for home exercise program.   Exercises were reviewed and Rosangela was able to demonstrate them prior to the end of the session.  Rosangela demonstrated good  understanding of the education provided. See Electronic Medical Record under Patient Instructions for exercises provided during therapy " "sessions    Assessment     Continued improvements in pain but feels discomfort at the end of the day "stone" feeling under heel. Did well with strengthening today but felt discomfort with SLS which she relates to her usual end of the day pain, performed arch curls in sitting position. Addition of ankle 4 way for strengthening to encourage improved strength with weight shifting with sit<>stands. Good tolerance. Reviewed importance of stopping any exercise or activity that increases her pain. Verbalized understanding. Will benefit from continued physical therapy intervention to progress toward goals set forth in plan of care to improve functional mobility and quality of life.       Pt presents with decreased ROM, muscle strength, flexibility, joint mobility. Increased pain, stiffness, soft tissue restriction. Impaired posture, joint mechanics, balance, gait pattern.      The patient's current task deficits include the following: limitation in ADL, self care, social/recreational tasks.     Rosangela is making fair progress towards meeting set goals.   Patient prognosis is Good.     Patient will continue to benefit from skilled outpatient physical therapy to address the deficits listed in the problem list box on initial evaluation, provide pt/family education and to maximize pt's level of independence in the home and community environment.      Anticipated barriers to physical therapy: chronicity of current injury and co morbidities     Goals:   Short Terms Goals: 3 weeks     Goal  Progress  Date    (1)  Patient will be I with HOME EXERCISE PROGRAM  ongoing 8/1/2024      (2)  Patient will obtain 4/5 L hip external rotation MMT to indicate improved ability to complete transitional movements   ongoing 8/1/2024      (3)   Patient will obtain 4/5 L knee flexion to indicate improved ability to walk, > 60 minutes  ongoing 8/1/2024           Long Term Goals: 6 weeks     Goal  Progress  Date    (1)  Patient will obtain 4+/5 L knee " "extension MMT to indicate improved ability to stand, > 60 minutes Ongoing   8/1/2024   (2)   Patient will obtain L SLS average of 3 trials < 25" to indicate improved ability to step over obstacles in pathway   ongoing 8/1/2024   (3)   Patient will obtain >115 deg L knee flexion ACTIVE RANGE OF MOTION to indicate improved ability to squat to lift objects, floor - waist level  ongoing 8/1/2024           Plan   Plan of care Certification: 7/2/2024 to 9/2/2024.    Outpatient Physical Therapy 2 times weekly for 6 weeks to include the following interventions: Cervical/Lumbar Traction, Electrical Stimulation re-eval, dry needling, Gait Training, Iontophoresis (with ), Manual Therapy, Moist Heat/ Ice, Neuromuscular Re-ed, Patient Education, Self Care, Therapeutic Activities, and Therapeutic Exercise.      Physical therapist and physical therapy assistant(s) will met face to face to discuss patient's treatment plan and progress towards established goals. Pt will be seen by a physical therapist minimally every 6th visit or every 30 days.    Barb Pedro PTA         "

## 2024-08-01 NOTE — PROGRESS NOTES
Headache questionnaire    1. When did your Headaches start?    6 YRS AGO      2. Where are your headaches located?   BACK LOWER/ BEHIND EAR      3. Your headache's characteristics:   Excruciating, Pressure, Throbbing,     4. How long does the headache last?   1-2 DAYS      5. How often does the headache occur?   monthly, SINUS HEADACHES/ALLERGIES      6. Are your headaches preceded or accompanied by other symptoms? N/A   If yes, please describe.  N/A      7. Does the headache awaken you at night? no   If so, how often? N/A        8. Please allen the word that best describes your headache's intensity:    severe      9. Using a scale of 1 through 10, with 0 = no pain and 10 = the worst pain:   What score is your headache now? 0   What score is your headache at its worst? 7   What score is your headache at its best? 0        10. Possible associated headache symptoms:  [x]  Sensitivity to light  [] Dizziness  [] Nasal or sinus pressure/ pain   [x] Sensitivity to noise  [] Vertigo  [] Problems with concentration  [x] Sensitivity to smells  [] Ringing in ears  [] Problems with memory    [] Blurred vision  [] Irritability  [] Problems with task completion   [] Double vision  [] Anger  []  Problems with relaxation  [] Loss of appetite  [] Anxiety  [] Neck tightness, Neck pain  [] Nausea   [] Nasal congestion  [] Vomiting         11. Headache improving factors:  [] Sleep  [] Heat  [] Darkness  [x] Ice  [x] Local pressure [x] Menses (period)  [x] Massage   [x] Medications:        12. Headache worsening factors:   [] Fatigue [] Sneezing  [] Changes in Weather  [x] Light [] Bending Over [] Stress  [x] Noise [] Ovulation  [] Multiple Sclerosis Flare-Up  [] Smells  [x] Menses  [] Food   [] Coughing [] Alcohol      13. Number of caffeinated drinks per day: 2      14. Number of diet drinks per day:  0      15. Have you seen any other [facility] within the last 3 years?  No    16. Bowel Habits: NORMAL   Please Check any  Medications or Procedures tried/failed for Headache    AED Neuromodulators  MAOIs  Ergot Alkaloids    Acetazolamide (Diamox) [] Phenelzine (Nardil) [] Dihydroergotamine (Migranal) []   Carbamazepine (Tegretol) [] Tranylcypromine (Parnate) [] Ergotamine (Ergomar) []   Gabapentin (Neurontin) [] Antihistamine/Serotonergic  Triptans    Lacosamide (Vimpat) [] Cyproheptadine (Periactin) [] Almotriptan (Axert) []   Lamotrigine (Lamictal) [] Antihypertensives  Eletriptan (Relpax) []   Levatiracetam (Keppra) [] Atenolol (Tenormin) [] Frovatriptan (Frova) []   Oxcarbazepine (Trileptal) [] Bisoprostol (Zebeta) [] Naratriptan (Amerge) []   Phenobarbital [] Candesartan (Atacand) [] Rizatriptan (Maxalt) []     Nebivolol (Bystolic)  Sumatriptan (Imitrex) []   Levetiracetam (Keppra)  Cardeilol (Coreg) [] Zolmitriptan (Zomig) []   Phenytoin (Dilantin) [] Diltiazem (Cardizem) []     Pregabalin (Lyrica) [] Lisinopril (Prinivil, Zestril) [] Combo Abortives    Primidone (Mysoline) [] Metoprolol (Toprol) [] BC Powder []   Tiagabine (Gabatril) [] Nadolol (Corgard) [] Butalbital and Acetaminophen (Bupap) []   Topiramate (Topamax)  (Trokendi) [] Nicardipine (Cardene) []     Vigabatrin (Sabril) [] Nimodipine (Nimotop) [] Butalbital, Acetaminophen, and caffeine (Fioricet) []   Valproic Acid (Depakote) (Divalproex Sodium) [] Propranolol (Inderal) []     Zonisamide (Zonegran) [] Telmisartan (Micardis) [] Butalbital, Aspirin, and caffeine (Fiorinal) []   Benzodiazepines  Timolol (Blocadren) []     Alprazolam (Xanax) [] Verapamil (Calan, Verelan) [] Butalbital, Caffeine, Acetaminophen, and Codeine (Fioricet with Codeine) []   Diazepam (Valium) [] NSAIDs      Lorazepam (Ativan) [] Acetaminophen (Tylenol) [x]     Clonazepam (Klonopin) [] Acetylsalicylic Acid (Aspirin) [] Butalbital, Caffeine, Aspirin, and Codeine  (Fiorinal with Codeine) []   Antidepressants  Diclofenac (Cambia) []     Amitriptyline (Elavil) [] Ibuprofen (Motrin) []      Desipramine (Norpramin) [] Indomethacin (Indocin) [] Aspirin, Caffeine, and Acetaminophen (Excedrin) (Goodys) []   Doxepin (Sinequan) [] Ketoprofen (Orudis) []     Fluoxetine (Prozac) [] Ketorolac (Toradol) [] Acetaminophen, Dichloralphenazone, and Isometheptene (Midrin) []   Imipramine (Tofranil) [] Naproxen (Anaprox) (Aleve) []     Nortriptyline (Pamelor) [] Meclofenamic Acid (Meclomen) []     Venlafaxine (Effexor) [] Meloxicam (Mobic) [] Procedures    Desvenlafazine (Pristiq) [] Monoclonals  Greater occipital nerve block []   Duloxetine (Cymbalta) [] Eptinezumab [] Cervical, Thoracic, Lumbar radiofrequency ablation []   Trazadone [] Erenumab-aooe (Aimovig) [] Spenopalatine ganglion block []   Wellbutrin [] Galcanezumab (Emgality) [] Occipital neuro stimulation []   Protriptyline (Vivactil) [] Fremanazumab-vfrm (Ajovy)  Cervical, Thoracic, Lumbar, Caudal Epidural steroid injection []   Escitalopram (Lexapro) [] Other [] Sacroiliac joint steroid injection []   Celexa [] Memantine (Namenda) [] Transforaminal epidural steroid injection []     Botox [] Facet joint injections []     Baclofen (Lioresal) [] Cervical, Thoracic, Lumbar medial branch blocks []       Cefaly []       Gamma Core []       Iovera []       Transcranial Magnetic stimulation []

## 2024-08-02 NOTE — PROGRESS NOTES
"Subjective:       Patient ID: Rosangela Salazar is a 34 y.o. female.    Chief Complaint: Headache    HPI    The patient is a 33 y/o female presenting for evaluation of headache. 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 Also PMHx of Anxiety, GERD, Plantar Fasciitis. She reports onset of headache coinciding with receiving treatment for the sarcoma.She is actually referred by her OB/GYN. During the time of treatment for her cancer, she was on continuous OCP which were stopped upon completion. She reports a very severe headache when stopping continuous contraception until she adjusted and her body "reset." She notes that cannot take NSAIDs due to effects of cancer treatment. A couple of weeks ago, she had a formal eye exam with no abnormalities found.   HIT-6: 58  Please see details of headache characteristics below.  Headache questionnaire    1. When did your Headaches start?    6 YRS AGO      2. Where are your headaches located?   BACK LOWER/ BEHIND EAR      3. Your headache's characteristics:   Excruciating, Pressure, Throbbing,     4. How long does the headache last?   1-2 DAYS      5. How often does the headache occur?   monthly, SINUS HEADACHES/ALLERGIES      6. Are your headaches preceded or accompanied by other symptoms? N/A   If yes, please describe.  N/A      7. Does the headache awaken you at night? no   If so, how often? N/A        8. Please allen the word that best describes your headache's intensity:    severe      9. Using a scale of 1 through 10, with 0 = no pain and 10 = the worst pain:   What score is your headache now? 0   What score is your headache at its worst? 7   What score is your headache at its best? 0        10. Possible associated headache symptoms:  [x]  Sensitivity to light  [] Dizziness  [] Nasal or sinus pressure/ pain   [x] Sensitivity to noise  [] Vertigo  [] Problems with concentration  [x] Sensitivity to " smells  [] Ringing in ears  [] Problems with memory    [] Blurred vision  [] Irritability  [] Problems with task completion   [] Double vision  [] Anger  []  Problems with relaxation  [] Loss of appetite  [] Anxiety  [] Neck tightness, Neck pain  [] Nausea   [] Nasal congestion  [] Vomiting         11. Headache improving factors:  [] Sleep  [] Heat  [] Darkness  [x] Ice  [x] Local pressure [x] Menses (period)  [x] Massage   [x] Medications:        12. Headache worsening factors:   [] Fatigue [] Sneezing  [] Changes in Weather  [x] Light [] Bending Over [] Stress  [x] Noise [] Ovulation  [] Multiple Sclerosis Flare-Up  [] Smells  [x] Menses  [] Food   [] Coughing [] Alcohol      13. Number of caffeinated drinks per day: 2      14. Number of diet drinks per day:  0    15. Bowel Habits: NORMAL   Please Check any Medications or Procedures tried/failed for Headache    AED Neuromodulators  MAOIs  Ergot Alkaloids    Acetazolamide (Diamox) [] Phenelzine (Nardil) [] Dihydroergotamine (Migranal) []   Carbamazepine (Tegretol) [] Tranylcypromine (Parnate) [] Ergotamine (Ergomar) []   Gabapentin (Neurontin) [] Antihistamine/Serotonergic  Triptans    Lacosamide (Vimpat) [] Cyproheptadine (Periactin) [] Almotriptan (Axert) []   Lamotrigine (Lamictal) [] Antihypertensives  Eletriptan (Relpax) []   Levatiracetam (Keppra) [] Atenolol (Tenormin) [] Frovatriptan (Frova) []   Oxcarbazepine (Trileptal) [] Bisoprostol (Zebeta) [] Naratriptan (Amerge) []   Phenobarbital [] Candesartan (Atacand) [] Rizatriptan (Maxalt) []     Nebivolol (Bystolic)  Sumatriptan (Imitrex) []   Levetiracetam (Keppra)  Cardeilol (Coreg) [] Zolmitriptan (Zomig) []   Phenytoin (Dilantin) [] Diltiazem (Cardizem) []     Pregabalin (Lyrica) [] Lisinopril (Prinivil, Zestril) [] Combo Abortives    Primidone (Mysoline) [] Metoprolol (Toprol) [] BC Powder []   Tiagabine (Gabatril) [] Nadolol (Corgard) [] Butalbital and Acetaminophen (Bupap) []   Topiramate  (Topamax)  (Trokendi) [] Nicardipine (Cardene) []     Vigabatrin (Sabril) [] Nimodipine (Nimotop) [] Butalbital, Acetaminophen, and caffeine (Fioricet) []   Valproic Acid (Depakote) (Divalproex Sodium) [] Propranolol (Inderal) []     Zonisamide (Zonegran) [] Telmisartan (Micardis) [] Butalbital, Aspirin, and caffeine (Fiorinal) []   Benzodiazepines  Timolol (Blocadren) []     Alprazolam (Xanax) [] Verapamil (Calan, Verelan) [] Butalbital, Caffeine, Acetaminophen, and Codeine (Fioricet with Codeine) []   Diazepam (Valium) [] NSAIDs      Lorazepam (Ativan) [] Acetaminophen (Tylenol) [x]     Clonazepam (Klonopin) [] Acetylsalicylic Acid (Aspirin) [] Butalbital, Caffeine, Aspirin, and Codeine  (Fiorinal with Codeine) []   Antidepressants  Diclofenac (Cambia) []     Amitriptyline (Elavil) [] Ibuprofen (Motrin) []     Desipramine (Norpramin) [] Indomethacin (Indocin) [] Aspirin, Caffeine, and Acetaminophen (Excedrin) (Goodys) []   Doxepin (Sinequan) [] Ketoprofen (Orudis) []     Fluoxetine (Prozac) [] Ketorolac (Toradol) [] Acetaminophen, Dichloralphenazone, and Isometheptene (Midrin) []   Imipramine (Tofranil) [] Naproxen (Anaprox) (Aleve) []     Nortriptyline (Pamelor) [] Meclofenamic Acid (Meclomen) []     Venlafaxine (Effexor) [] Meloxicam (Mobic) [] Procedures    Desvenlafazine (Pristiq) [] Monoclonals  Greater occipital nerve block []   Duloxetine (Cymbalta) [] Eptinezumab [] Cervical, Thoracic, Lumbar radiofrequency ablation []   Trazadone [] Erenumab-aooe (Aimovig) [] Spenopalatine ganglion block []   Wellbutrin [] Galcanezumab (Emgality) [] Occipital neuro stimulation []   Protriptyline (Vivactil) [] Fremanazumab-vfrm (Ajovy)  Cervical, Thoracic, Lumbar, Caudal Epidural steroid injection []   Escitalopram (Lexapro) [] Other [] Sacroiliac joint steroid injection []   Celexa [] Memantine (Namenda) [] Transforaminal epidural steroid injection []     Botox [] Facet joint injections []     Baclofen (Lioresal) []  Cervical, Thoracic, Lumbar medial branch blocks []       Cefaly []       Gamma Core []       Iovera []       Transcranial Magnetic stimulation []                           Review of Systems   Constitutional:  Negative for activity change, appetite change, fatigue and fever.   HENT:  Negative for congestion, dental problem, hearing loss, sinus pressure, tinnitus, trouble swallowing and voice change.    Eyes:  Positive for visual disturbance. Negative for photophobia, pain and redness.   Respiratory:  Negative for cough, chest tightness and shortness of breath.    Cardiovascular:  Positive for palpitations. Negative for chest pain and leg swelling.   Gastrointestinal:  Negative for abdominal pain, blood in stool, nausea and vomiting.   Endocrine: Negative for cold intolerance and heat intolerance.   Genitourinary:  Negative for difficulty urinating, frequency, menstrual problem and urgency.   Musculoskeletal:  Negative for arthralgias, back pain, gait problem, joint swelling, myalgias, neck pain and neck stiffness.   Skin: Negative.    Neurological:  Negative for dizziness, tremors, seizures, syncope, facial asymmetry, speech difficulty, weakness, light-headedness and numbness.   Hematological:  Negative for adenopathy. Does not bruise/bleed easily.   Psychiatric/Behavioral:  Negative for agitation, behavioral problems, confusion, decreased concentration, self-injury, sleep disturbance and suicidal ideas. The patient is not nervous/anxious and is not hyperactive.                Past Medical History:   Diagnosis Date    Allergy     seasonal    Bone cancer     Gall bladder inflammation     Mass of knee 3/29/2018    Soft tissue sarcoma     Tachycardia      Past Surgical History:   Procedure Laterality Date    BONE GRAFT Left 11/29/2018    femur     COLONOSCOPY  01/2013    COLONOSCOPY  08/2021    KNEE SURGERY      left femur open biopsy      tumor removal left leg      WISDOM TOOTH EXTRACTION       Family History    Problem Relation Name Age of Onset    Colon cancer Paternal Grandmother Peace     Cancer Paternal Grandmother Peace     No Known Problems Father      Diabetes Mother Lo     Ovarian cancer Maternal Aunt Vannessa     Diabetes Maternal Aunt Vannessa     Ovarian cancer Maternal Aunt Deann     Cancer Maternal Aunt Deann     Diabetes Maternal Aunt Deann     Diabetes Maternal Grandmother Yokasta     Diabetes Maternal Grandfather      Diabetes Maternal Uncle Joseluis     Diabetes Cousin      Cancer Maternal Aunt Seb      Social History     Socioeconomic History    Marital status:      Spouse name: Joseluis   Occupational History    Occupation:    Tobacco Use    Smoking status: Never    Smokeless tobacco: Never   Substance and Sexual Activity    Alcohol use: Yes     Comment: 1-2 a month    Drug use: Never    Sexual activity: Yes     Partners: Male     Birth control/protection: Condom, OCP     Social Determinants of Health     Financial Resource Strain: Low Risk  (6/24/2024)    Overall Financial Resource Strain (CARDIA)     Difficulty of Paying Living Expenses: Not very hard   Food Insecurity: No Food Insecurity (6/24/2024)    Hunger Vital Sign     Worried About Running Out of Food in the Last Year: Never true     Ran Out of Food in the Last Year: Never true   Transportation Needs: No Transportation Needs (8/3/2023)    PRAPARE - Transportation     Lack of Transportation (Medical): No     Lack of Transportation (Non-Medical): No   Physical Activity: Sufficiently Active (6/24/2024)    Exercise Vital Sign     Days of Exercise per Week: 5 days     Minutes of Exercise per Session: 30 min   Stress: Stress Concern Present (6/24/2024)    [employer] - Occupational Stress Questionnaire     Feeling of Stress : Very much   Housing Stability: Low Risk  (8/3/2023)    Housing Stability Vital Sign     Unable to Pay for Housing in the Last Year: No     Number of Places Lived in the Last Year: 1      Unstable Housing in the Last Year: No     Review of patient's allergies indicates:   Allergen Reactions    Ethanol (ethyl alcohol)     Zithromax [azithromycin] Nausea Only       Current Outpatient Medications:     ALPRAZolam (XANAX) 0.25 MG tablet, Take 1 tablet (0.25 mg total) by mouth daily as needed for Anxiety., Disp: 30 tablet, Rfl: 2    azelastine (ASTELIN) 137 mcg (0.1 %) nasal spray, SPRAY 1 SPRAY BY NASAL ROUTE 2 TIMES DAILY., Disp: 90 mL, Rfl: 3    busPIRone (BUSPAR) 10 MG tablet, Take 1 tablet (10 mg total) by mouth 2 (two) times daily., Disp: 180 tablet, Rfl: 3    cetirizine (ZYRTEC) 10 MG tablet, Take 10 mg by mouth once daily., Disp: , Rfl:     dicyclomine (BENTYL) 20 mg tablet, Take 1 tablet (20 mg total) by mouth 3 (three) times daily as needed (abdominal pain/ bloating)., Disp: 20 tablet, Rfl: 0    fluticasone propionate (FLONASE) 50 mcg/actuation nasal spray, 1 spray (50 mcg total) by Each Nostril route 2 (two) times a day., Disp: 16 g, Rfl: 11    LORazepam (ATIVAN) 0.5 MG tablet, Take by mouth., Disp: , Rfl:     metoprolol tartrate (LOPRESSOR) 25 MG tablet, Take 25 mg by mouth 2 (two) times daily., Disp: , Rfl:     montelukast (SINGULAIR) 10 mg tablet, TAKE 1 TABLET BY MOUTH EVERY DAY AT NIGHT, Disp: 90 tablet, Rfl: 1    multivitamin (THERAGRAN) per tablet, Take 2 tablets by mouth., Disp: , Rfl:     omeprazole (PRILOSEC) 40 MG capsule, Take 40 mg by mouth once daily., Disp: , Rfl:     psyllium (METAMUCIL) powder, Take 1 packet by mouth., Disp: , Rfl:     calcium citrate-vitamin D3 200 mg calcium -250 unit Tab, Take 2 tablets by mouth Daily.  (Patient not taking: Reported on 8/1/2024), Disp: , Rfl:     LOW-OGESTREL, 28, 0.3-30 mg-mcg per tablet, TAKE 1 TABLET BY MOUTH ONCE DAILY. SKIP PLACEBO WEEK. (Patient not taking: Reported on 6/24/2024), Disp: 84 tablet, Rfl: 3    sumatriptan (IMITREX) 50 MG tablet, Take 1 at onset of headache, may repeat in 2 hours to a max of 3 per day, 2 days per week,  Disp: 9 tablet, Rfl: 3      Objective:      Vitals:    08/01/24 0925   BP: 113/79   Pulse: 83   Resp: 17   Temp: 97.1 °F (36.2 °C)     Body mass index is 37.91 kg/m².    Physical Exam    Constitutional:   She appears well-developed and well-nourished. She is well groomed    HENT:    Head: Atraumatic, oral and nasal mucosa intact  Eyes: Conjunctivae and EOM are normal. Pupils are equal, round, and reactive to light OU  Neck: Neck supple. No thyromegaly present  Cardiovascular: Normal rate and normal heart sounds  No murmur heard  Pulmonary/Chest: Effort normal and breath sounds normal  Musculoskeletal: Normal range of motion. No joint stiffness. No vertebral point tenderness  Skin: Skin is warm and dry  Psychiatric: Normal mood and anxiuos affect     Neuro exam:    Mental status:  Awake, attentive, Alert, oriented to self, place, year and month  Language function is intact    Cranial Nerves:  Smell was not formally evaluated  Cranial Nerves II - XII: intact  Pursuits were smooth, normal saccades, no nystagmus OU  Funduscopic exam - disc were flat and pink, no exudates or hemorrhages OU  Motor - facial movement was symmetrical and normal     Palate moved well and was symmetrical with normal palatal and oral sensation  Tongue movements were full    Coordination:     Rapid alternating movements and rapid finger tapping - normal bilaterally  Finger to nose - normal and symmetric bilaterally   Heel to shin test - normal and symmetric bilaterally   Arm roll - smooth and symmetric   No intentional or positional tremor.     Motor:  Normal muscle bulk and symmetry. No fasciculations were noted    No pronator drift  Strength 5/5 bilaterally     Reflexes:  Tendon reflexes were 2 + at biceps, triceps, brachioradialis, patellar, and Achilles bilaterally  On plantar stimulation toes were down going bilaterally  No clonus was noted     Sensory: Intact to light touch, pin prick in all extremities. Vibration and proprioception intact  in all extremities.     Gait: Romberg absent. Normal gait. Normal arm swing and turns.     Review of Data:  Lab Results   Component Value Date     08/28/2023    K 4.3 08/28/2023     08/28/2023    CO2 21 (L) 08/28/2023    BUN 12 09/18/2023    BUN 10 08/28/2023    CREATININE 0.71 09/18/2023    CREATININE 0.66 08/28/2023    GLU 87 08/28/2023    HGBA1C 5.5 09/14/2023    AST 42 (H) 08/28/2023    ALT 27 08/28/2023    ALBUMIN 4.3 08/28/2023    PROT 8.0 08/28/2023    BILITOT 0.6 08/28/2023       Lab Results   Component Value Date    WBC 6.7 09/18/2023    HGB 12.7 09/18/2023    HCT 39.8 09/18/2023    MCV 90 09/18/2023     09/18/2023       Lab Results   Component Value Date    TSH 1.210 04/01/2024               Assessment and Plan   The patient is a 35 y/o female who meets criteria for migraine, low frequency episodic, predominantly lisa-menstrual. She reports Visual disturbance not consistent with migraine aura. Recent eye exam negative. Weight stable. No papilledema on exam today. Onset of migraine coinciding with treatment for sarcoma of the LLE, likely multifactorial. Hormonal fluctuations from stopping OCD, lifestyles changes, stress related to condition etc. She has never had imaging before. Will obtain an MRI of the brain W WO to rule out conditions such demyelinating disease  -     MRI Brain W WO Contrast; Future; Expected date: 08/01/2024    Menstrual migraine without status migrainosus, not intractable, Will treat the acute attacks and will hold prevention for now. Preemptive treatment is a consideration  -     sumatriptan (IMITREX) 50 MG tablet; Take 1 at onset of headache, may repeat in 2 hours to a max of 3 per day, 2 days per week  Dispense: 9 tablet; Refill: 3    I have discussed the side effects of the medications prescribed and the patient acknowledges understanding    I spent 60 minutes on the day of this encounter preparing, treating, and managing this patient with migraine headaches and  Hx of sarcoma        Alesha Schwartz M.D   of Neurology  ACGME Board Certified, Neurology  [facility], Board certified, headache Medicine  Medical Director, Headache and Facial Pain  [facility]

## 2024-08-06 ENCOUNTER — CLINICAL SUPPORT (OUTPATIENT)
Dept: REHABILITATION | Facility: HOSPITAL | Age: 34
End: 2024-08-06
Payer: COMMERCIAL

## 2024-08-06 ENCOUNTER — PATIENT MESSAGE (OUTPATIENT)
Dept: NEUROLOGY | Facility: CLINIC | Age: 34
End: 2024-08-06
Payer: COMMERCIAL

## 2024-08-06 DIAGNOSIS — M72.2 PLANTAR FASCIITIS OF LEFT FOOT: Primary | ICD-10-CM

## 2024-08-06 DIAGNOSIS — R26.2 DIFFICULTY WALKING: ICD-10-CM

## 2024-08-06 PROCEDURE — 97112 NEUROMUSCULAR REEDUCATION: CPT | Mod: PN,CQ

## 2024-08-06 PROCEDURE — 97140 MANUAL THERAPY 1/> REGIONS: CPT | Mod: PN,CQ

## 2024-08-06 PROCEDURE — 97530 THERAPEUTIC ACTIVITIES: CPT | Mod: PN,CQ

## 2024-08-06 PROCEDURE — 97110 THERAPEUTIC EXERCISES: CPT | Mod: PN,CQ

## 2024-08-06 RX ORDER — LORAZEPAM 1 MG/1
TABLET ORAL
Qty: 1 TABLET | Refills: 0 | Status: SHIPPED | OUTPATIENT
Start: 2024-08-06

## 2024-08-08 ENCOUNTER — HOSPITAL ENCOUNTER (OUTPATIENT)
Dept: RADIOLOGY | Facility: HOSPITAL | Age: 34
Discharge: HOME | End: 2024-08-08
Attending: PSYCHIATRY & NEUROLOGY
Payer: COMMERCIAL

## 2024-08-08 DIAGNOSIS — H53.9 VISUAL DISTURBANCE: ICD-10-CM

## 2024-08-08 PROCEDURE — 70553 MRI BRAIN STEM W/O & W/DYE: CPT | Mod: TC,PO

## 2024-08-08 PROCEDURE — 70553 MRI BRAIN STEM W/O & W/DYE: CPT | Mod: 26,,, | Performed by: RADIOLOGY

## 2024-08-08 PROCEDURE — 25500020 PHARM REV CODE 255: Mod: PO | Performed by: PSYCHIATRY & NEUROLOGY

## 2024-08-08 PROCEDURE — A9585 GADOBUTROL INJECTION: HCPCS | Mod: PO | Performed by: PSYCHIATRY & NEUROLOGY

## 2024-08-08 RX ORDER — GADOBUTROL 604.72 MG/ML
10 INJECTION INTRAVENOUS
Status: COMPLETED | OUTPATIENT
Start: 2024-08-08 | End: 2024-08-08

## 2024-08-08 RX ADMIN — GADOBUTROL 10 ML: 604.72 INJECTION INTRAVENOUS at 11:08

## 2024-08-09 ENCOUNTER — CLINICAL SUPPORT (OUTPATIENT)
Dept: REHABILITATION | Facility: HOSPITAL | Age: 34
End: 2024-08-09
Payer: COMMERCIAL

## 2024-08-09 DIAGNOSIS — R26.2 DIFFICULTY WALKING: ICD-10-CM

## 2024-08-09 DIAGNOSIS — M72.2 PLANTAR FASCIITIS OF LEFT FOOT: Primary | ICD-10-CM

## 2024-08-09 PROCEDURE — 97110 THERAPEUTIC EXERCISES: CPT | Mod: PN,CQ

## 2024-08-13 ENCOUNTER — CLINICAL SUPPORT (OUTPATIENT)
Dept: REHABILITATION | Facility: HOSPITAL | Age: 34
End: 2024-08-13
Payer: COMMERCIAL

## 2024-08-13 DIAGNOSIS — R26.2 DIFFICULTY WALKING: ICD-10-CM

## 2024-08-13 DIAGNOSIS — M72.2 PLANTAR FASCIITIS OF LEFT FOOT: Primary | ICD-10-CM

## 2024-08-13 PROCEDURE — 97140 MANUAL THERAPY 1/> REGIONS: CPT | Mod: PN

## 2024-08-13 PROCEDURE — 97112 NEUROMUSCULAR REEDUCATION: CPT | Mod: PN

## 2024-08-13 PROCEDURE — 97110 THERAPEUTIC EXERCISES: CPT | Mod: PN

## 2024-08-13 NOTE — PROGRESS NOTES
[facility]   Physical Therapy Progress Note      Name: Rosangela Salazar  Clinic Number: [id number]    Therapy Diagnosis:   Encounter Diagnoses   Name Primary?    Plantar fasciitis of left foot Yes    Difficulty walking      Physician: Mattie Castro MD    Visit Date: 8/13/2024    Physician Orders: PT Eval and Treat   Medical Diagnosis from Referral: M72.2 (ICD-10-CM) - Plantar fasciitis of left foot  Evaluation Date: 7/2/2024  Authorization Period Expiration: 12/31/2024  Plan of Care Expiration: 10/13/2024  Visit # / Visits authorized: 10/ 12  #Visits based on PHYSICAL THERAPY POC: 12 (requested continued PHYSICAL THERAPY to total 24 visits on 8/14/2024)      Foto  Date  Score       Foot   Lower Score = Greater Disability   #1/3 7/2/2024 51.0   #2/3 7/18/2024 51.0    #3/3  8/9/2024  49  FOTO not yet d/c      Time In: 4:00pm  Time Out: 5:20pm  Total Time: 80  minutes     Precautions: Standard, patient is now 5 years out from cancer treatment   PTA Visit #: 1/5     Subjective   Pain: 0/10  Location: bilateral bottom of L heel     HOME EXERCISE PROGRAM  Reviewed, reports compliance    Response  Less pain in the morning, but notices the pain at the end of the work day   Current functional status  Limitation in walking, negotiating stairs,    Previous functional status  No running, jumping   Patient stated goal Improve walking tolerance for walking (particularly for work in [place]).       Objective    8/13/2024:  Observation/posture:   non weight bearing: R arch slightly higher than L arch   Weight bearing: R arch slightly higher than L arch      Gait/assistive device:   Quad cane for longer walks - ex: hiking      Range of Motion: AROM:  Ankle Right  Left    Dorsiflexion 7 10   Plantarflexion 42 48   Inversion 34 20   Eversion 13 12      Knee  Right  Left    Flexion  128 115   Extension  -3 -3      Strength:  Ankle Right  Left    Dorsiflexion 4+/5 4+/5   Plantarflexion 4/5 4/5  "  Inversion 4+/5 4+/5   Eversion 4/5 4/5      Hip  Right  Left    Flexion  4/5 4/5   Internal rotation  4/5 4/5   External rotation  4/5 4/5      Knee  Right  Left    Flexion  4/5 4-/5   Extension  4+/5 4-/5      Joint Mobility:     Left   TCJ Hypomobile    STJ Hypomobile       Flexibility:     Left   Gastroc Restricted    Soleus Restricted    Plantar fascia  Restricted, knotting along tissues       Function/balance:     Right Left   Single limb stance 27", 23", 28" 30", 6", 18"       Edema:     Right Left   Ankle figure of eight 52.5 cm 55.0 cm   Ankle joint line 27.0 cm  28.0 cm   METs       Treatment     + indicates new exercise   Bold indicates completed exercise     therapeutic exercises to develop strength, endurance, ROM, flexibility, posture, and core stabilization for 34 minutes:  L soleus stretch, incline board, 30"x3   Standing plantar fascia stretch 3 x 30s-long sitting with towel, knee slightly bent  4 way ankle left, +green band, 3 x 10  +Updated PHYSICAL THERAPY POC   Patient education on nature of current condition and PHYSICAL THERAPY POC     manual therapy techniques: Joint mobilizations, Manual traction, Myofacial release, Soft tissue Mobilization, and Friction Massage for 20 minutes:  L STM Plantar Fascia, Achilles decompression cupping along L Achilles, STM using firm tool at L plantar fascia   L TCJ, STJ Gr III/IV JM  +DRY NEEDLING: prep, drape, palpation, education. Application of TDN: Pt educated on benefits and potential side effects of dry needling. Educated pt to use heat following treatment sessions if pt is experiencing pain or soreness. Pt verbalized good understanding of education. Pt gave verbal and written consent for DN. Dry needling to the below listed structures using 30 mm needles:  Achilles (L)      neuromuscular re-education activities to improve: Balance, Coordination, Kinesthetic, Sense, Proprioception, and Posture for 20 minutes:  R/L SLS airex mat, 30"x 3  B arch curls 3" " "hold, standing on foam 3x10  Tandem stance on  foam 2 x 30s each  Minisquats. airex, 3 x 10 with manual cues for symmetrical WB   Toe yoga x 20     therapeutic activities to improve functional performance for 6 minutes:  +dynamic heel lifts x 2 laps   Sit<>stands from 18" box 2 airex x +30     gait training to improve functional mobility and safety for 00 minutes:       Patient Education and Home Exercises       Education provided:   - Educated pt that he/she may feel soreness after session.      Home Exercises Provided:  Instructed patient to continue current home exercise program.  Exercises were reviewed and Rosangela was able to demonstrate them prior to the end of the session.  Rosangela demonstrated good  understanding of the education provided. See Electronic Medical Record under Patient Instructions for exercises provided during therapy sessions    Assessment   Dry needling implemented at L Achilles today with no adverse s/s. Patient presents with gradually improving mobility, strength, and functional tolerances since onset of PHYSICAL THERAPY POC. However, she requires additional visits to address gait mechanics in addition to listed impairments in order to optimize her safety and efficiency with functional tasks.      Pt presents with decreased ROM, muscle strength, flexibility, joint mobility. Increased pain, stiffness, soft tissue restriction. Impaired posture, joint mechanics, balance, gait pattern.      The patient's current task deficits include the following: limitation in ADL, self care, social/recreational tasks.     Rosangela is making fair progress towards meeting set goals.   Patient prognosis is Good.     Patient will continue to benefit from skilled outpatient physical therapy to address the deficits listed in the problem list box on initial evaluation, provide pt/family education and to maximize pt's level of independence in the home and community environment.      Anticipated barriers to physical " "therapy: chronicity of current injury and co morbidities     Goals:   Short Terms Goals: 3 weeks     Goal  Progress  Date    (1)  Patient will be I with HOME EXERCISE PROGRAM  Progressing, not met 8/13/2024      (2)  Patient will obtain 4/5 L hip external rotation MMT to indicate improved ability to complete transitional movements   ongoing, not met 8/13/2024      (3)   Patient will obtain 4/5 L knee flexion to indicate improved ability to walk, > 60 minutes  Ongoing, not met 8/13/2024           Long Term Goals: 6 weeks     Goal  Progress  Date    (1)  Patient will obtain 4+/5 L knee extension MMT to indicate improved ability to stand, > 60 minutes Ongoing, not met   8/13/2024   (2)   Patient will obtain L SLS average of 3 trials < 25" to indicate improved ability to step over obstacles in pathway  Progressing, not met 8/13/2024   (3)   Patient will obtain >115 deg L knee flexion ACTIVE RANGE OF MOTION to indicate improved ability to squat to lift objects, floor - waist level Ongoing, not met 8/13/2024           Plan   Plan of care Certification: 8/13/2024 to 10/13/2024.    Outpatient Physical Therapy 2 times weekly for 7 weeks to include the following interventions: Cervical/Lumbar Traction, Electrical Stimulation re-eval, dry needling, Gait Training, Iontophoresis (with ), Manual Therapy, Moist Heat/ Ice, Neuromuscular Re-ed, Patient Education, Self Care, Therapeutic Activities, and Therapeutic Exercise.      Physical therapist and physical therapy assistant(s) will met face to face to discuss patient's treatment plan and progress towards established goals. Pt will be seen by a physical therapist minimally every 6th visit or every 30 days.    Mirtha Sepulveda, PT     I CERTIFY THE NEED FOR THESE SERVICES FURNISHED UNDER THIS PLAN OF TREATMENT AND WHILE UNDER MY CARE     Physician's comments:           Physician's Signature: ___________________________________________________               "

## 2024-08-14 NOTE — PLAN OF CARE
[facility]   Physical Therapy Progress Note      Name: Rosangela Salazar  Clinic Number: [id number]    Therapy Diagnosis:   Encounter Diagnoses   Name Primary?    Plantar fasciitis of left foot Yes    Difficulty walking      Physician: Mattie Castro MD    Visit Date: 8/13/2024    Physician Orders: PT Eval and Treat   Medical Diagnosis from Referral: M72.2 (ICD-10-CM) - Plantar fasciitis of left foot  Evaluation Date: 7/2/2024  Authorization Period Expiration: 12/31/2024  Plan of Care Expiration: 10/13/2024  Visit # / Visits authorized: 10/ 12  #Visits based on PHYSICAL THERAPY POC: 12 (requested continued PHYSICAL THERAPY to total 24 visits on 8/14/2024)      Foto  Date  Score       Foot   Lower Score = Greater Disability   #1/3 7/2/2024 51.0   #2/3 7/18/2024 51.0    #3/3  8/9/2024  49  FOTO not yet d/c      Time In: 4:00pm  Time Out: 5:20pm  Total Time: 80  minutes     Precautions: Standard, patient is now 5 years out from cancer treatment   PTA Visit #: 1/5     Subjective   Pain: 0/10  Location: bilateral bottom of L heel     HOME EXERCISE PROGRAM  Reviewed, reports compliance    Response  Less pain in the morning, but notices the pain at the end of the work day   Current functional status  Limitation in walking, negotiating stairs,    Previous functional status  No running, jumping   Patient stated goal Improve walking tolerance for walking (particularly for work in [employer]).       Objective    8/13/2024:  Observation/posture:   non weight bearing: R arch slightly higher than L arch   Weight bearing: R arch slightly higher than L arch      Gait/assistive device:   Quad cane for longer walks - ex: hiking      Range of Motion: AROM:  Ankle Right  Left    Dorsiflexion 7 10   Plantarflexion 42 48   Inversion 34 20   Eversion 13 12      Knee  Right  Left    Flexion  128 115   Extension  -3 -3      Strength:  Ankle Right  Left    Dorsiflexion 4+/5 4+/5   Plantarflexion 4/5 4/5  "  Inversion 4+/5 4+/5   Eversion 4/5 4/5      Hip  Right  Left    Flexion  4/5 4/5   Internal rotation  4/5 4/5   External rotation  4/5 4/5      Knee  Right  Left    Flexion  4/5 4-/5   Extension  4+/5 4-/5      Joint Mobility:     Left   TCJ Hypomobile    STJ Hypomobile       Flexibility:     Left   Gastroc Restricted    Soleus Restricted    Plantar fascia  Restricted, knotting along tissues       Function/balance:     Right Left   Single limb stance 27", 23", 28" 30", 6", 18"       Edema:     Right Left   Ankle figure of eight 52.5 cm 55.0 cm   Ankle joint line 27.0 cm  28.0 cm   METs       Treatment     + indicates new exercise   Bold indicates completed exercise     therapeutic exercises to develop strength, endurance, ROM, flexibility, posture, and core stabilization for 34 minutes:  L soleus stretch, incline board, 30"x3   Standing plantar fascia stretch 3 x 30s-long sitting with towel, knee slightly bent  4 way ankle left, +green band, 3 x 10  +Updated PHYSICAL THERAPY POC   Patient education on nature of current condition and PHYSICAL THERAPY POC     manual therapy techniques: Joint mobilizations, Manual traction, Myofacial release, Soft tissue Mobilization, and Friction Massage for 20 minutes:  L STM Plantar Fascia, Achilles decompression cupping along L Achilles, STM using firm tool at L plantar fascia   L TCJ, STJ Gr III/IV JM  +DRY NEEDLING: prep, drape, palpation, education. Application of TDN: Pt educated on benefits and potential side effects of dry needling. Educated pt to use heat following treatment sessions if pt is experiencing pain or soreness. Pt verbalized good understanding of education. Pt gave verbal and written consent for DN. Dry needling to the below listed structures using 30 mm needles:  Achilles (L)      neuromuscular re-education activities to improve: Balance, Coordination, Kinesthetic, Sense, Proprioception, and Posture for 20 minutes:  R/L SLS airex mat, 30"x 3  B arch curls 3" " "hold, standing on foam 3x10  Tandem stance on  foam 2 x 30s each  Minisquats. airex, 3 x 10 with manual cues for symmetrical WB   Toe yoga x 20     therapeutic activities to improve functional performance for 6 minutes:  +dynamic heel lifts x 2 laps   Sit<>stands from 18" box 2 airex x +30     gait training to improve functional mobility and safety for 00 minutes:       Patient Education and Home Exercises       Education provided:   - Educated pt that he/she may feel soreness after session.      Home Exercises Provided:  Instructed patient to continue current home exercise program.  Exercises were reviewed and Rosangela was able to demonstrate them prior to the end of the session.  Rosangela demonstrated good  understanding of the education provided. See Electronic Medical Record under Patient Instructions for exercises provided during therapy sessions    Assessment   Dry needling implemented at L Achilles today with no adverse s/s. Patient presents with gradually improving mobility, strength, and functional tolerances since onset of PHYSICAL THERAPY POC. However, she requires additional visits to address gait mechanics in addition to listed impairments in order to optimize her safety and efficiency with functional tasks.      Pt presents with decreased ROM, muscle strength, flexibility, joint mobility. Increased pain, stiffness, soft tissue restriction. Impaired posture, joint mechanics, balance, gait pattern.      The patient's current task deficits include the following: limitation in ADL, self care, social/recreational tasks.     Rosangela is making fair progress towards meeting set goals.   Patient prognosis is Good.     Patient will continue to benefit from skilled outpatient physical therapy to address the deficits listed in the problem list box on initial evaluation, provide pt/family education and to maximize pt's level of independence in the home and community environment.      Anticipated barriers to physical " "therapy: chronicity of current injury and co morbidities     Goals:   Short Terms Goals: 3 weeks     Goal  Progress  Date    (1)  Patient will be I with HOME EXERCISE PROGRAM  Progressing, not met 8/13/2024      (2)  Patient will obtain 4/5 L hip external rotation MMT to indicate improved ability to complete transitional movements   ongoing, not met 8/13/2024      (3)   Patient will obtain 4/5 L knee flexion to indicate improved ability to walk, > 60 minutes  Ongoing, not met 8/13/2024           Long Term Goals: 6 weeks     Goal  Progress  Date    (1)  Patient will obtain 4+/5 L knee extension MMT to indicate improved ability to stand, > 60 minutes Ongoing, not met   8/13/2024   (2)   Patient will obtain L SLS average of 3 trials < 25" to indicate improved ability to step over obstacles in pathway  Progressing, not met 8/13/2024   (3)   Patient will obtain >115 deg L knee flexion ACTIVE RANGE OF MOTION to indicate improved ability to squat to lift objects, floor - waist level Ongoing, not met 8/13/2024           Plan   Plan of care Certification: 8/13/2024 to 10/13/2024.    Outpatient Physical Therapy 2 times weekly for 7 weeks to include the following interventions: Cervical/Lumbar Traction, Electrical Stimulation re-eval, dry needling, Gait Training, Iontophoresis (with ), Manual Therapy, Moist Heat/ Ice, Neuromuscular Re-ed, Patient Education, Self Care, Therapeutic Activities, and Therapeutic Exercise.      Physical therapist and physical therapy assistant(s) will met face to face to discuss patient's treatment plan and progress towards established goals. Pt will be seen by a physical therapist minimally every 6th visit or every 30 days.    Mirtha Sepulveda, PT     I CERTIFY THE NEED FOR THESE SERVICES FURNISHED UNDER THIS PLAN OF TREATMENT AND WHILE UNDER MY CARE     Physician's comments:           Physician's Signature: ___________________________________________________               "

## 2024-08-15 ENCOUNTER — CLINICAL SUPPORT (OUTPATIENT)
Dept: REHABILITATION | Facility: HOSPITAL | Age: 34
End: 2024-08-15
Payer: COMMERCIAL

## 2024-08-15 DIAGNOSIS — M72.2 PLANTAR FASCIITIS OF LEFT FOOT: Primary | ICD-10-CM

## 2024-08-15 DIAGNOSIS — R26.2 DIFFICULTY WALKING: ICD-10-CM

## 2024-08-15 PROCEDURE — 97112 NEUROMUSCULAR REEDUCATION: CPT | Mod: PN

## 2024-08-15 PROCEDURE — 97110 THERAPEUTIC EXERCISES: CPT | Mod: PN

## 2024-08-15 PROCEDURE — 97140 MANUAL THERAPY 1/> REGIONS: CPT | Mod: PN

## 2024-08-15 NOTE — PROGRESS NOTES
[facility]   Physical Therapy Treatment Note      Name: Rosangela Salazar  [facility] Number: [id number]    Therapy Diagnosis:   Encounter Diagnoses   Name Primary?    Plantar fasciitis of left foot Yes    Difficulty walking      Physician: Mattie Castro MD    Visit Date: 8/15/2024    Physician Orders: PT Eval and Treat   Medical Diagnosis from Referral: M72.2 (ICD-10-CM) - Plantar fasciitis of left foot  Evaluation Date: 7/2/2024  Authorization Period Expiration: 12/31/2024  Plan of Care Expiration: 10/13/2024  Visit # / Visits authorized: 11/ 12  #Visits based on PHYSICAL THERAPY POC: 12 (requested continued PHYSICAL THERAPY to total 24 visits on 8/14/2024)      Foto  Date  Score       Foot   Lower Score = Greater Disability   #1/3 7/2/2024 51.0   #2/3 7/18/2024 51.0    #3/3  8/9/2024  49  FOTO not yet d/c      Time In: 4:06pm  Time Out: 5:14pm  Total Time: 68 minutes     Precautions: Standard, patient is now 5 years out from cancer treatment   PTA Visit #: 1/5     Subjective   Pain: 0/10  Location: bilateral bottom of L heel     HOME EXERCISE PROGRAM  Reviewed, reports compliance    Response  Less pain in the morning, but notices the pain at the end of the work day   Current functional status  Limitation in walking, negotiating stairs,    Previous functional status  No running, jumping   Patient stated goal Improve walking tolerance for walking (particularly for work in [employer]).       Objective    8/13/2024:  Observation/posture:   non weight bearing: R arch slightly higher than L arch   Weight bearing: R arch slightly higher than L arch      Gait/assistive device:   Quad cane for longer walks - ex: hiking      Range of Motion: AROM:  Ankle Right  Left    Dorsiflexion 7 10   Plantarflexion 42 48   Inversion 34 20   Eversion 13 12      Knee  Right  Left    Flexion  128 115   Extension  -3 -3      Strength:  Ankle Right  Left    Dorsiflexion 4+/5 4+/5   Plantarflexion 4/5 4/5  "  Inversion 4+/5 4+/5   Eversion 4/5 4/5      Hip  Right  Left    Flexion  4/5 4/5   Internal rotation  4/5 4/5   External rotation  4/5 4/5      Knee  Right  Left    Flexion  4/5 4-/5   Extension  4+/5 4-/5      Joint Mobility:     Left   TCJ Hypomobile    STJ Hypomobile       Flexibility:     Left   Gastroc Restricted    Soleus Restricted    Plantar fascia  Restricted, knotting along tissues       Function/balance:     Right Left   Single limb stance 27", 23", 28" 30", 6", 18"       Edema:     Right Left   Ankle figure of eight 52.5 cm 55.0 cm   Ankle joint line 27.0 cm  28.0 cm   METs       Treatment     + indicates new exercise   Bold indicates completed exercise     therapeutic exercises to develop strength, endurance, ROM, flexibility, posture, and core stabilization for 20 minutes:  L soleus stretch, incline board, 30"x3   L standing plantar fascia stretch, 30"x5   4 way ankle left, green band, 3 x 10  +L toe flex, standing on red band, 3x10      manual therapy techniques: Joint mobilizations, Manual traction, Myofacial release, Soft tissue Mobilization, and Friction Massage for 22 minutes:  L STM Plantar Fascia, Achilles decompression cupping along L Achilles, STM using firm tool at L plantar fascia   L TCJ, STJ Gr III/IV JM  +DRY NEEDLING: prep, drape, palpation, education. Application of TDN: Pt educated on benefits and potential side effects of dry needling. Educated pt to use heat following treatment sessions if pt is experiencing pain or soreness. Pt verbalized good understanding of education. Pt gave verbal and written consent for DN. Dry needling to the below listed structures using 15 mm needles:  Achilles (L), plantar fascia      neuromuscular re-education activities to improve: Balance, Coordination, Kinesthetic, Sense, Proprioception, and Posture for 20 minutes:  R/L SLS airex mat, 30"x 3  B arch curls 3" hold, standing on foam 3x10  R/L tandem stance on foam, 30"x2   Toe yoga x 20     therapeutic " "activities to improve functional performance for 6 minutes:  Dynamic heel lifts x 2 laps   Sit<>stands from 18" box 2 airex x +30     gait training to improve functional mobility and safety for 00 minutes:       Patient Education and Home Exercises       Education provided:   - Educated pt that he/she may feel soreness after session.      Home Exercises Provided:  Instructed patient to continue current home exercise program.  Exercises were reviewed and Rosangela was able to demonstrate them prior to the end of the session.  Rosangela demonstrated good  understanding of the education provided. See Electronic Medical Record under Patient Instructions for exercises provided during therapy sessions    Assessment   Dry needling implemented at L plantar fascia today with no adverse s/s. Improved localization of stretch to plantar fascia during standing towel stretch.      Pt presents with decreased ROM, muscle strength, flexibility, joint mobility. Increased pain, stiffness, soft tissue restriction. Impaired posture, joint mechanics, balance, gait pattern.      The patient's current task deficits include the following: limitation in ADL, self care, social/recreational tasks.     Rosangela is making fair progress towards meeting set goals.   Patient prognosis is Good.     Patient will continue to benefit from skilled outpatient physical therapy to address the deficits listed in the problem list box on initial evaluation, provide pt/family education and to maximize pt's level of independence in the home and community environment.      Anticipated barriers to physical therapy: chronicity of current injury and co morbidities     Goals:   Short Terms Goals: 3 weeks     Goal  Progress  Date    (1)  Patient will be I with HOME EXERCISE PROGRAM  Progressing, not met 8/15/2024      (2)  Patient will obtain 4/5 L hip external rotation MMT to indicate improved ability to complete transitional movements   ongoing, not met 8/15/2024      (3)   " "Patient will obtain 4/5 L knee flexion to indicate improved ability to walk, > 60 minutes  Ongoing, not met 8/15/2024           Long Term Goals: 6 weeks     Goal  Progress  Date    (1)  Patient will obtain 4+/5 L knee extension MMT to indicate improved ability to stand, > 60 minutes Ongoing, not met   8/15/2024   (2)   Patient will obtain L SLS average of 3 trials < 25" to indicate improved ability to step over obstacles in pathway  Progressing, not met 8/15/2024   (3)   Patient will obtain >115 deg L knee flexion ACTIVE RANGE OF MOTION to indicate improved ability to squat to lift objects, floor - waist level Ongoing, not met 8/15/2024           Plan   Plan of care Certification: 8/13/2024 to 10/13/2024.    Outpatient Physical Therapy 2 times weekly for 7 weeks to include the following interventions: Cervical/Lumbar Traction, Electrical Stimulation re-eval, dry needling, Gait Training, Iontophoresis (with ), Manual Therapy, Moist Heat/ Ice, Neuromuscular Re-ed, Patient Education, Self Care, Therapeutic Activities, and Therapeutic Exercise.      Physical therapist and physical therapy assistant(s) will met face to face to discuss patient's treatment plan and progress towards established goals. Pt will be seen by a physical therapist minimally every 6th visit or every 30 days.    Mirtha Sepulveda, PT                   "

## 2024-08-20 ENCOUNTER — CLINICAL SUPPORT (OUTPATIENT)
Dept: REHABILITATION | Facility: HOSPITAL | Age: 34
End: 2024-08-20
Payer: COMMERCIAL

## 2024-08-20 DIAGNOSIS — M72.2 PLANTAR FASCIITIS OF LEFT FOOT: Primary | ICD-10-CM

## 2024-08-20 DIAGNOSIS — R26.2 DIFFICULTY WALKING: ICD-10-CM

## 2024-08-20 PROCEDURE — 97110 THERAPEUTIC EXERCISES: CPT | Mod: PN

## 2024-08-20 PROCEDURE — 97112 NEUROMUSCULAR REEDUCATION: CPT | Mod: PN

## 2024-08-20 PROCEDURE — 97140 MANUAL THERAPY 1/> REGIONS: CPT | Mod: PN

## 2024-08-20 NOTE — PROGRESS NOTES
[facility]   Physical Therapy Treatment Note      Name: Rosangela Salazar  [facility] Number: [id number]    Therapy Diagnosis:   Encounter Diagnoses   Name Primary?    Plantar fasciitis of left foot Yes    Difficulty walking      Physician: Mattie Castro MD    Visit Date: 8/20/2024    Physician Orders: PT Eval and Treat   Medical Diagnosis from Referral: M72.2 (ICD-10-CM) - Plantar fasciitis of left foot  Evaluation Date: 7/2/2024  Authorization Period Expiration: 12/31/2024  Plan of Care Expiration: 10/13/2024  Visit # / Visits authorized: 12/ 31  #Visits based on PHYSICAL THERAPY POC: 24      Foto  Date  Score       Foot   Lower Score = Greater Disability   #1/3 7/2/2024 51.0   #2/3 7/18/2024 51.0    #3/3  8/9/2024  49  FOTO not yet d/c      Time In: 4:07pm  Time Out: 5:06pm  Total Time: 59 minutes     Precautions: Standard, patient is now 5 years out from cancer treatment   PTA Visit #: 1/5     Subjective   Pain: 0/10  Location: bilateral bottom of L heel     HOME EXERCISE PROGRAM  Reviewed, reports compliance    Response  Feels like L heel pain was worse first thing in the mornings after progression of dry needling last visit.    Current functional status  Limitation in walking, negotiating stairs,    Previous functional status  No running, jumping   Patient stated goal Improve walking tolerance for walking (particularly for work in [employer]).       Objective    8/13/2024:  Observation/posture:   non weight bearing: R arch slightly higher than L arch   Weight bearing: R arch slightly higher than L arch      Gait/assistive device:   Quad cane for longer walks - ex: hiking      Range of Motion: AROM:  Ankle Right  Left    Dorsiflexion 7 10   Plantarflexion 42 48   Inversion 34 20   Eversion 13 12      Knee  Right  Left    Flexion  128 115   Extension  -3 -3      Strength:  Ankle Right  Left    Dorsiflexion 4+/5 4+/5   Plantarflexion 4/5 4/5   Inversion 4+/5 4+/5   Eversion 4/5 4/5     "  Hip  Right  Left    Flexion  4/5 4/5   Internal rotation  4/5 4/5   External rotation  4/5 4/5      Knee  Right  Left    Flexion  4/5 4-/5   Extension  4+/5 4-/5      Joint Mobility:     Left   TCJ Hypomobile    STJ Hypomobile       Flexibility:     Left   Gastroc Restricted    Soleus Restricted    Plantar fascia  Restricted, knotting along tissues       Function/balance:     Right Left   Single limb stance 27", 23", 28" 30", 6", 18"       Edema:     Right Left   Ankle figure of eight 52.5 cm 55.0 cm   Ankle joint line 27.0 cm  28.0 cm   METs       Treatment     + indicates new exercise   Bold indicates completed exercise     therapeutic exercises to develop strength, endurance, ROM, flexibility, posture, and core stabilization for 13 minutes:  L soleus stretch, incline board, 30"x3   L standing plantar fascia stretch, 30"x5   4 way ankle left, green band, 3 x 10  L toe flex, standing on red band, 3x10      manual therapy techniques: Joint mobilizations, Manual traction, Myofacial release, Soft tissue Mobilization, and Friction Massage for 20 minutes:  L STM Plantar Fascia, L Achilles decompression cupping, STM using firm tool at L plantar fascia   L TCJ, STJ Gr III/IV JM  DRY NEEDLING: prep, drape, palpation, education. Application of TDN: Pt educated on benefits and potential side effects of dry needling. Educated pt to use heat following treatment sessions if pt is experiencing pain or soreness. Pt verbalized good understanding of education. Pt gave verbal and written consent for DN. Dry needling to the below listed structures using 15 mm needles:  Achilles (L), plantar fascia      neuromuscular re-education activities to improve: Balance, Coordination, Kinesthetic, Sense, Proprioception, and Posture for 20 minutes:  R/L SLS airex mat, 30"x 3  B arch curls 3" hold, standing on foam 3x10  R/L tandem stance on foam, 30"x2   Toe yoga x 20     therapeutic activities to improve functional performance for 6 " "minutes:  Dynamic heel lifts x 2 laps   Sit<>stands from 18" box 2 airex x +30     gait training to improve functional mobility and safety for 00 minutes:       Patient Education and Home Exercises       Education provided:   - Educated pt that he/she may feel soreness after session.      Home Exercises Provided:  Instructed patient to continue current home exercise program.  Exercises were reviewed and Rosangela was able to demonstrate them prior to the end of the session.  Rosangela demonstrated good  understanding of the education provided. See Electronic Medical Record under Patient Instructions for exercises provided during therapy sessions    Assessment   Pes planus at L>R impacts current s/s. She continues with palpable fascial restriction during manual intervention. Decompression cupping at L Achilles implemented today to address localized tightness in addition to STJ, TCJ, and MET JM.      Pt presents with decreased ROM, muscle strength, flexibility, joint mobility. Increased pain, stiffness, soft tissue restriction. Impaired posture, joint mechanics, balance, gait pattern.      The patient's current task deficits include the following: limitation in ADL, self care, social/recreational tasks.     Rosangela is making fair progress towards meeting set goals.   Patient prognosis is Good.     Patient will continue to benefit from skilled outpatient physical therapy to address the deficits listed in the problem list box on initial evaluation, provide pt/family education and to maximize pt's level of independence in the home and community environment.      Anticipated barriers to physical therapy: chronicity of current injury and co morbidities     Goals:   Short Terms Goals: 3 weeks     Goal  Progress  Date    (1)  Patient will be I with HOME EXERCISE PROGRAM  Progressing, not met 8/20/2024      (2)  Patient will obtain 4/5 L hip external rotation MMT to indicate improved ability to complete transitional movements   " "ongoing, not met 8/20/2024      (3)   Patient will obtain 4/5 L knee flexion to indicate improved ability to walk, > 60 minutes  Ongoing, not met 8/20/2024           Long Term Goals: 6 weeks     Goal  Progress  Date    (1)  Patient will obtain 4+/5 L knee extension MMT to indicate improved ability to stand, > 60 minutes Ongoing, not met   8/20/2024   (2)   Patient will obtain L SLS average of 3 trials < 25" to indicate improved ability to step over obstacles in pathway  Progressing, not met 8/20/2024   (3)   Patient will obtain >115 deg L knee flexion ACTIVE RANGE OF MOTION to indicate improved ability to squat to lift objects, floor - waist level Ongoing, not met 8/20/2024           Plan   Plan of care Certification: 8/13/2024 to 10/13/2024.    Outpatient Physical Therapy 2 times weekly for 7 weeks to include the following interventions: Cervical/Lumbar Traction, Electrical Stimulation re-eval, dry needling, Gait Training, Iontophoresis (with ), Manual Therapy, Moist Heat/ Ice, Neuromuscular Re-ed, Patient Education, Self Care, Therapeutic Activities, and Therapeutic Exercise.      Physical therapist and physical therapy assistant(s) will met face to face to discuss patient's treatment plan and progress towards established goals. Pt will be seen by a physical therapist minimally every 6th visit or every 30 days.    Mirtha Sepulveda, PT                     "

## 2024-08-27 ENCOUNTER — CLINICAL SUPPORT (OUTPATIENT)
Dept: REHABILITATION | Facility: HOSPITAL | Age: 34
End: 2024-08-27
Payer: COMMERCIAL

## 2024-08-27 DIAGNOSIS — R26.2 DIFFICULTY WALKING: ICD-10-CM

## 2024-08-27 DIAGNOSIS — M72.2 PLANTAR FASCIITIS OF LEFT FOOT: Primary | ICD-10-CM

## 2024-08-27 PROCEDURE — 97112 NEUROMUSCULAR REEDUCATION: CPT | Mod: PN

## 2024-08-27 PROCEDURE — 97110 THERAPEUTIC EXERCISES: CPT | Mod: PN

## 2024-08-27 PROCEDURE — 97140 MANUAL THERAPY 1/> REGIONS: CPT | Mod: PN

## 2024-08-27 NOTE — PROGRESS NOTES
[facility]   Physical Therapy Treatment Note      Name: Rosangela Salazar  Clinic Number: [id number]    Therapy Diagnosis:   Encounter Diagnoses   Name Primary?    Plantar fasciitis of left foot Yes    Difficulty walking      Physician: Mattie Castro MD    Visit Date: 8/27/2024    Physician Orders: PT Eval and Treat   Medical Diagnosis from Referral: M72.2 (ICD-10-CM) - Plantar fasciitis of left foot  Evaluation Date: 7/2/2024  Authorization Period Expiration: 12/31/2024  Plan of Care Expiration: 10/13/2024  Visit # / Visits authorized: 13/ 31  #Visits based on PHYSICAL THERAPY POC: 24      Foto  Date  Score       Foot   Lower Score = Greater Disability   #1/3 7/2/2024 51.0   #2/3 7/18/2024 51.0    #3/3  8/9/2024  49  FOTO not yet d/c      Time In: 3:25pm  Time Out: 4:40pm  Total Time: 75 minutes     Precautions: Standard, patient is now 5 years out from cancer treatment   PTA Visit #: 1/5     Subjective   Pain: 0/10  Location: bilateral bottom of L heel     HOME EXERCISE PROGRAM  Reviewed, reports compliance    Response  Walked 5-6 miles day 1 at [place] and felt increased symptoms at foot right away while walking in addition to that night. Had to leave [facility] early due to leg fatigue > foot pain the following days at [place]    Current functional status  Limitation in walking, negotiating stairs    Previous functional status  No running, jumping   Patient stated goal Improve walking tolerance for walking (particularly for work in [employer]).       Objective    8/13/2024:  Observation/posture:   non weight bearing: R arch slightly higher than L arch   Weight bearing: R arch slightly higher than L arch      Gait/assistive device:   Quad cane for longer walks - ex: hiking      Range of Motion: AROM:  Ankle Right  Left    Dorsiflexion 7 10   Plantarflexion 42 48   Inversion 34 20   Eversion 13 12      Knee  Right  Left    Flexion  128 115   Extension  -3 -3      Strength:  Ankle Right  Left   "  Dorsiflexion 4+/5 4+/5   Plantarflexion 4/5 4/5   Inversion 4+/5 4+/5   Eversion 4/5 4/5      Hip  Right  Left    Flexion  4/5 4/5   Internal rotation  4/5 4/5   External rotation  4/5 4/5      Knee  Right  Left    Flexion  4/5 4-/5   Extension  4+/5 4-/5      Joint Mobility:     Left   TCJ Hypomobile    STJ Hypomobile       Flexibility:     Left   Gastroc Restricted    Soleus Restricted    Plantar fascia  Restricted, knotting along tissues       Function/balance:     Right Left   Single limb stance 27", 23", 28" 30", 6", 18"       Edema:     Right Left   Ankle figure of eight 52.5 cm 55.0 cm   Ankle joint line 27.0 cm  28.0 cm   METs       Treatment     + indicates new exercise   Bold indicates completed exercise     therapeutic exercises to develop strength, endurance, ROM, flexibility, posture, and core stabilization for 19 minutes:  L soleus stretch, incline board, 30"x+5   L standing plantar fascia stretch, 30"x5   4 way ankle left, green band, 3 x 10  L toe flex, standing on +green band, 3x10      manual therapy techniques: Joint mobilizations, Manual traction, Myofacial release, Soft tissue Mobilization, and Friction Massage for 25 minutes:  L STM Plantar Fascia, L Achilles decompression cupping, STM using firm tool at L plantar fascia   L TCJ, STJ Gr III/IV JM  DRY NEEDLING: prep, drape, palpation, education. Application of TDN: Pt educated on benefits and potential side effects of dry needling. Educated pt to use heat following treatment sessions if pt is experiencing pain or soreness. Pt verbalized good understanding of education. Pt gave verbal and written consent for DN. Dry needling to the below listed structures using 15 mm needles:  Achilles (L), plantar fascia      neuromuscular re-education activities to improve: Balance, Coordination, Kinesthetic, Sense, Proprioception, and Posture for 25 minutes:  R/L SLS airex mat, 30"x 3  B arch curls 3" hold, standing on foam 3x10  R/L tandem stance on foam, " "30"x2   Toe yoga, +3x10  +L ankle/foot circles on 'nalanda' board, CW/CCW, eversion direction only, 3x10 each way      therapeutic activities to improve functional performance for 6 minutes:  Dynamic heel lifts x 2 laps   Sit<>stands from 18" box 2 airex x +30     gait training to improve functional mobility and safety for 00 minutes:       Patient Education and Home Exercises       Education provided:   - Educated pt that he/she may feel soreness after session.      Home Exercises Provided:  Instructed patient to continue current home exercise program.  Exercises were reviewed and Rosangela was able to demonstrate them prior to the end of the session.  Rosangela demonstrated good  understanding of the education provided. See Electronic Medical Record under Patient Instructions for exercises provided during therapy sessions    Assessment   Pes planus at L>R impacts current s/s. She continues with palpable fascial restriction during manual intervention. Dry needling implemented at L Achilles with patient report of feeling less symptoms during today as compared to last needling session.      Pt presents with decreased ROM, muscle strength, flexibility, joint mobility. Increased pain, stiffness, soft tissue restriction. Impaired posture, joint mechanics, balance, gait pattern.      The patient's current task deficits include the following: limitation in ADL, self care, social/recreational tasks.     Rosangela is making fair progress towards meeting set goals.   Patient prognosis is Good.     Patient will continue to benefit from skilled outpatient physical therapy to address the deficits listed in the problem list box on initial evaluation, provide pt/family education and to maximize pt's level of independence in the home and community environment.      Anticipated barriers to physical therapy: chronicity of current injury and co morbidities     Goals:   Short Terms Goals: 3 weeks     Goal  Progress  Date    (1)  Patient will be I " "with HOME EXERCISE PROGRAM  Progressing, not met 8/27/2024      (2)  Patient will obtain 4/5 L hip external rotation MMT to indicate improved ability to complete transitional movements   ongoing, not met 8/27/2024      (3)   Patient will obtain 4/5 L knee flexion to indicate improved ability to walk, > 60 minutes  Ongoing, not met 8/27/2024           Long Term Goals: 6 weeks     Goal  Progress  Date    (1)  Patient will obtain 4+/5 L knee extension MMT to indicate improved ability to stand, > 60 minutes Ongoing, not met   8/27/2024   (2)   Patient will obtain L SLS average of 3 trials < 25" to indicate improved ability to step over obstacles in pathway  Progressing, not met 8/27/2024   (3)   Patient will obtain >115 deg L knee flexion ACTIVE RANGE OF MOTION to indicate improved ability to squat to lift objects, floor - waist level Ongoing, not met 8/27/2024           Plan   Plan of care Certification: 8/13/2024 to 10/13/2024.    Outpatient Physical Therapy 2 times weekly for 7 weeks to include the following interventions: Cervical/Lumbar Traction, Electrical Stimulation re-eval, dry needling, Gait Training, Iontophoresis (with ), Manual Therapy, Moist Heat/ Ice, Neuromuscular Re-ed, Patient Education, Self Care, Therapeutic Activities, and Therapeutic Exercise.      Physical therapist and physical therapy assistant(s) will met face to face to discuss patient's treatment plan and progress towards established goals. Pt will be seen by a physical therapist minimally every 6th visit or every 30 days.    Mirtha Sepulveda, PT                       "

## 2024-08-29 ENCOUNTER — CLINICAL SUPPORT (OUTPATIENT)
Dept: REHABILITATION | Facility: HOSPITAL | Age: 34
End: 2024-08-29
Payer: COMMERCIAL

## 2024-08-29 DIAGNOSIS — R26.2 DIFFICULTY WALKING: ICD-10-CM

## 2024-08-29 DIAGNOSIS — M72.2 PLANTAR FASCIITIS OF LEFT FOOT: Primary | ICD-10-CM

## 2024-08-29 PROCEDURE — 97140 MANUAL THERAPY 1/> REGIONS: CPT | Mod: PN

## 2024-08-29 PROCEDURE — 97112 NEUROMUSCULAR REEDUCATION: CPT | Mod: PN

## 2024-08-29 NOTE — PROGRESS NOTES
[facility]   Physical Therapy Treatment Note      Name: Rosangela Salazar  Clinic Number: [id number]    Therapy Diagnosis:   Encounter Diagnoses   Name Primary?    Plantar fasciitis of left foot Yes    Difficulty walking      Physician: Mattie Castro MD    Visit Date: 8/29/2024    Physician Orders: PT Eval and Treat   Medical Diagnosis from Referral: M72.2 (ICD-10-CM) - Plantar fasciitis of left foot  Evaluation Date: 7/2/2024  Authorization Period Expiration: 12/31/2024  Plan of Care Expiration: 10/13/2024  Visit # / Visits authorized: 14/ 31  #Visits based on PHYSICAL THERAPY POC: 24      Foto  Date  Score       Foot   Lower Score = Greater Disability   #1/3 7/2/2024 51.0   #2/3 7/18/2024 51.0    #3/3  8/9/2024  49  FOTO not yet d/c      Time in   4:50pm   Time out   5:51pm   Total time   61 minutes      Precautions: Standard, patient is now 5 years out from cancer treatment   PTA Visit #: 1/5     Subjective   Pain: 0/10  Location: bilateral bottom of L heel     HOME EXERCISE PROGRAM  Reviewed, reports compliance    Response  Noticed increased swelling at her L leg while at [place]. Woke up in the night with pain at her foot following visit with needling implemented at [place].    Current functional status  Limitation in walking, negotiating stairs    Previous functional status  No running, jumping   Patient stated goal Improve walking tolerance for walking (particularly for work in [employer]).       Objective    8/13/2024:  Observation/posture:   non weight bearing: R arch slightly higher than L arch   Weight bearing: R arch slightly higher than L arch      Gait/assistive device:   Quad cane for longer walks - ex: hiking      Range of Motion: AROM:  Ankle Right  Left    Dorsiflexion 7 10   Plantarflexion 42 48   Inversion 34 20   Eversion 13 12      Knee  Right  Left    Flexion  128 115   Extension  -3 -3      Strength:  Ankle Right  Left    Dorsiflexion 4+/5 4+/5   Plantarflexion  "4/5 4/5   Inversion 4+/5 4+/5   Eversion 4/5 4/5      Hip  Right  Left    Flexion  4/5 4/5   Internal rotation  4/5 4/5   External rotation  4/5 4/5      Knee  Right  Left    Flexion  4/5 4-/5   Extension  4+/5 4-/5      Joint Mobility:     Left   TCJ Hypomobile    STJ Hypomobile       Flexibility:     Left   Gastroc Restricted    Soleus Restricted    Plantar fascia  Restricted, knotting along tissues       Function/balance:     Right Left   Single limb stance 27", 23", 28" 30", 6", 18"       Edema:     Right Left   Ankle figure of eight 52.5 cm 55.0 cm   Ankle joint line 27.0 cm  28.0 cm   METs       Treatment     + indicates new exercise   Bold indicates completed exercise     therapeutic exercises to develop strength, endurance, ROM, flexibility, posture, and core stabilization for 5 minutes:  L soleus stretch, incline board, 30"x+5   L standing plantar fascia stretch, 30"x5   4 way ankle left, green band, 3 x 10  L toe flex, standing on green band, 3x10      manual therapy techniques: Joint mobilizations, Manual traction, Myofacial release, Soft tissue Mobilization, and Friction Massage for 25 minutes:  L STM Plantar Fascia, L Achilles decompression cupping, STM using firm tool at L plantar fascia   L TCJ, STJ Gr III/IV JM  DRY NEEDLING: prep, drape, palpation, education. Application of TDN: Pt educated on benefits and potential side effects of dry needling. Educated pt to use heat following treatment sessions if pt is experiencing pain or soreness. Pt verbalized good understanding of education. Pt gave verbal and written consent for DN. Dry needling to the below listed structures using 15 mm needles:  Achilles (L), plantar fascia      neuromuscular re-education activities to improve: Balance, Coordination, Kinesthetic, Sense, Proprioception, and Posture for 25 minutes:  R/L SLS airex mat, 30"x 3  B arch curls 3" hold, standing on foam 3x10  R/L tandem stance on foam, 30"x2   Toe yoga, 3x10  L ankle/foot circles " "on BAPS board, CW/CCW, eversion direction only, 3x10 each way      therapeutic activities to improve functional performance for 6 minutes:  Dynamic heel lifts x 2 laps   Sit<>stands from 18" box 2 airex x +30  +Lateral steps, green band at forefoot, x 3 laps      gait training to improve functional mobility and safety for 00 minutes:       Patient Education and Home Exercises       Education provided:   - Educated pt that he/she may feel soreness after session.      Home Exercises Provided:  Instructed patient to continue current home exercise program.  Exercises were reviewed and Rosangela was able to demonstrate them prior to the end of the session.  Rosangela demonstrated good  understanding of the education provided. See Electronic Medical Record under Patient Instructions for exercises provided during therapy sessions    Assessment   Pes planus at L>R impacts current s/s. She continues with palpable fascial restriction during manual intervention. STM and decompression cupping implemented at area of L Achilles and plantar fascia. Lateral steps with green band initiated today with patient report of feeling burning at lateral hips from muscles working.      Pt presents with decreased ROM, muscle strength, flexibility, joint mobility. Increased pain, stiffness, soft tissue restriction. Impaired posture, joint mechanics, balance, gait pattern.      The patient's current task deficits include the following: limitation in ADL, self care, social/recreational tasks.     Rosangela is making fair progress towards meeting set goals.   Patient prognosis is Good.     Patient will continue to benefit from skilled outpatient physical therapy to address the deficits listed in the problem list box on initial evaluation, provide pt/family education and to maximize pt's level of independence in the home and community environment.      Anticipated barriers to physical therapy: chronicity of current injury and co morbidities     Goals:   Short " "Terms Goals: 3 weeks     Goal  Progress  Date    (1)  Patient will be I with HOME EXERCISE PROGRAM  Progressing, not met 8/29/2024      (2)  Patient will obtain 4/5 L hip external rotation MMT to indicate improved ability to complete transitional movements   ongoing, not met 8/29/2024      (3)   Patient will obtain 4/5 L knee flexion to indicate improved ability to walk, > 60 minutes  Ongoing, not met 8/29/2024           Long Term Goals: 6 weeks     Goal  Progress  Date    (1)  Patient will obtain 4+/5 L knee extension MMT to indicate improved ability to stand, > 60 minutes Ongoing, not met   8/29/2024   (2)   Patient will obtain L SLS average of 3 trials < 25" to indicate improved ability to step over obstacles in pathway  Progressing, not met 8/29/2024   (3)   Patient will obtain >115 deg L knee flexion ACTIVE RANGE OF MOTION to indicate improved ability to squat to lift objects, floor - waist level Ongoing, not met 8/29/2024           Plan   Plan of care Certification: 8/13/2024 to 10/13/2024.    Outpatient Physical Therapy 2 times weekly for 7 weeks to include the following interventions: Cervical/Lumbar Traction, Electrical Stimulation re-eval, dry needling, Gait Training, Iontophoresis (with ), Manual Therapy, Moist Heat/ Ice, Neuromuscular Re-ed, Patient Education, Self Care, Therapeutic Activities, and Therapeutic Exercise.      Physical therapist and physical therapy assistant(s) will met face to face to discuss patient's treatment plan and progress towards established goals. Pt will be seen by a physical therapist minimally every 6th visit or every 30 days.    Mirtha Sepulveda, PT                         "

## 2024-09-02 NOTE — PROGRESS NOTES
[facility]   Physical Therapy Treatment Note      Name: Rosangela Salazar  Clinic Number: [id number]    Therapy Diagnosis:   Encounter Diagnoses   Name Primary?    Plantar fasciitis of left foot Yes    Difficulty walking      Physician: Mattie Castro MD    Visit Date: 9/3/2024    Physician Orders: PT Eval and Treat   Medical Diagnosis from Referral: M72.2 (ICD-10-CM) - Plantar fasciitis of left foot  Evaluation Date: 7/2/2024  Authorization Period Expiration: 12/31/2024  Plan of Care Expiration: 10/13/2024  Visit # / Visits authorized: 15/ 31  #Visits based on PHYSICAL THERAPY POC: 24      Foto  Date  Score       Foot   Lower Score = Greater Disability   #1/3 7/2/2024 51.0   #2/3 7/18/2024 51.0    #3/3  8/9/2024  49  FOTO not yet d/c      Time in   5:31pm   Time out   6:35pm   Total time   64 minutes      Precautions: Standard, patient is now 5 years out from cancer treatment   PTA Visit #: 1/5     Subjective   Pain: 0/10  Location: bilateral bottom of L heel     HOME EXERCISE PROGRAM  Reviewed, reports compliance    Response  Felt no pain at foot after manual intervention last visit    Current functional status  Limitation in walking, negotiating stairs    Previous functional status  No running, jumping   Patient stated goal Improve walking tolerance for walking (particularly for work in [employer]).       Objective    8/13/2024:  Observation/posture:   non weight bearing: R arch slightly higher than L arch   Weight bearing: R arch slightly higher than L arch      Gait/assistive device:   Quad cane for longer walks - ex: hiking      Range of Motion: AROM:  Ankle Right  Left    Dorsiflexion 7 10   Plantarflexion 42 48   Inversion 34 20   Eversion 13 12      Knee  Right  Left    Flexion  128 115   Extension  -3 -3      Strength:  Ankle Right  Left    Dorsiflexion 4+/5 4+/5   Plantarflexion 4/5 4/5   Inversion 4+/5 4+/5   Eversion 4/5 4/5      Hip  Right  Left    Flexion  4/5 4/5  "  Internal rotation  4/5 4/5   External rotation  4/5 4/5      Knee  Right  Left    Flexion  4/5 4-/5   Extension  4+/5 4-/5      Joint Mobility:     Left   TCJ Hypomobile    STJ Hypomobile       Flexibility:     Left   Gastroc Restricted    Soleus Restricted    Plantar fascia  Restricted, knotting along tissues       Function/balance:     Right Left   Single limb stance 27", 23", 28" 30", 6", 18"       Edema:     Right Left   Ankle figure of eight 52.5 cm 55.0 cm   Ankle joint line 27.0 cm  28.0 cm   METs       Treatment     + indicates new exercise   Bold indicates completed exercise     therapeutic exercises to develop strength, endurance, ROM, flexibility, posture, and core stabilization for 20 minutes:  L soleus stretch, incline board, 30"x5   B gastroc stretch, incline board, 30"x5   L standing plantar fascia stretch, 30"x5   4 way ankle left, green band, 3 x 10  L toe flex, standing on green band, 3x10   +B heel raises, airex mat, B HRA, 3x10      manual therapy techniques: Joint mobilizations, Manual traction, Myofacial release, Soft tissue Mobilization, and Friction Massage for 24 minutes:  L STM Plantar Fascia, L Achilles decompression cupping, STM using firm tool at L plantar fascia   L TCJ, STJ Gr III/IV JM  DRY NEEDLING: prep, drape, palpation, education. Application of TDN: Pt educated on benefits and potential side effects of dry needling. Educated pt to use heat following treatment sessions if pt is experiencing pain or soreness. Pt verbalized good understanding of education. Pt gave verbal and written consent for DN. Dry needling to the below listed structures using 15 mm needles:  Achilles (L), plantar fascia      neuromuscular re-education activities to improve: Balance, Coordination, Kinesthetic, Sense, Proprioception, and Posture for 15 minutes:  R/L SLS airex mat, 30"x 5  B arch curls 3" hold, standing on foam 3x10  R/L tandem stance on foam, 30"x2   Toe yoga, 3x10  L ankle/foot circles on BAPS " "board, CW/CCW, eversion direction only, 3x10 each way      therapeutic activities to improve functional performance for 5 minutes:  Dynamic heel lifts x 2 laps   Sit<>stands from 18" box 2 airex x +30  Lateral steps, green band at forefoot, x 3 laps      gait training to improve functional mobility and safety for 00 minutes:       Patient Education and Home Exercises       Education provided:   - Educated pt that he/she may feel soreness after session.      Home Exercises Provided:  Instructed patient to continue current home exercise program.  Exercises were reviewed and Rosangela was able to demonstrate them prior to the end of the session.  Rosangela demonstrated good  understanding of the education provided. See Electronic Medical Record under Patient Instructions for exercises provided during therapy sessions    Assessment   Pes planus at L>R impacts current s/s. She continues with palpable fascial restriction during manual intervention. STM and decompression cupping implemented at area of L Achilles and plantar fascia. Heel raises initiated today with patient report of feeling burning at distal LE/foot from muscles working.      Pt presents with decreased ROM, muscle strength, flexibility, joint mobility. Increased pain, stiffness, soft tissue restriction. Impaired posture, joint mechanics, balance, gait pattern.      The patient's current task deficits include the following: limitation in ADL, self care, social/recreational tasks.     Rosangela is making fair progress towards meeting set goals.   Patient prognosis is Good.     Patient will continue to benefit from skilled outpatient physical therapy to address the deficits listed in the problem list box on initial evaluation, provide pt/family education and to maximize pt's level of independence in the home and community environment.      Anticipated barriers to physical therapy: chronicity of current injury and co morbidities     Goals:   Short Terms Goals: 3 weeks     " "Goal  Progress  Date    (1)  Patient will be I with HOME EXERCISE PROGRAM  Progressing, not met 9/3/2024      (2)  Patient will obtain 4/5 L hip external rotation MMT to indicate improved ability to complete transitional movements   ongoing, not met 9/3/2024      (3)   Patient will obtain 4/5 L knee flexion to indicate improved ability to walk, > 60 minutes  Ongoing, not met 9/3/2024           Long Term Goals: 6 weeks     Goal  Progress  Date    (1)  Patient will obtain 4+/5 L knee extension MMT to indicate improved ability to stand, > 60 minutes Ongoing, not met   9/3/2024   (2)   Patient will obtain L SLS average of 3 trials < 25" to indicate improved ability to step over obstacles in pathway  Progressing, not met 9/3/2024   (3)   Patient will obtain >115 deg L knee flexion ACTIVE RANGE OF MOTION to indicate improved ability to squat to lift objects, floor - waist level Ongoing, not met 9/3/2024           Plan   Plan of care Certification: 8/13/2024 to 10/13/2024.    Outpatient Physical Therapy 2 times weekly for 7 weeks to include the following interventions: Cervical/Lumbar Traction, Electrical Stimulation re-eval, dry needling, Gait Training, Iontophoresis (with ), Manual Therapy, Moist Heat/ Ice, Neuromuscular Re-ed, Patient Education, Self Care, Therapeutic Activities, and Therapeutic Exercise.      Physical therapist and physical therapy assistant(s) will met face to face to discuss patient's treatment plan and progress towards established goals. Pt will be seen by a physical therapist minimally every 6th visit or every 30 days.    Mirtha Sepulveda, PT                           "

## 2024-09-03 ENCOUNTER — CLINICAL SUPPORT (OUTPATIENT)
Dept: REHABILITATION | Facility: HOSPITAL | Age: 34
End: 2024-09-03
Payer: COMMERCIAL

## 2024-09-03 DIAGNOSIS — R26.2 DIFFICULTY WALKING: ICD-10-CM

## 2024-09-03 DIAGNOSIS — M72.2 PLANTAR FASCIITIS OF LEFT FOOT: Primary | ICD-10-CM

## 2024-09-03 PROCEDURE — 97110 THERAPEUTIC EXERCISES: CPT | Mod: PN

## 2024-09-03 PROCEDURE — 97140 MANUAL THERAPY 1/> REGIONS: CPT | Mod: PN

## 2024-09-03 PROCEDURE — 97112 NEUROMUSCULAR REEDUCATION: CPT | Mod: PN

## 2024-09-05 ENCOUNTER — CLINICAL SUPPORT (OUTPATIENT)
Dept: REHABILITATION | Facility: HOSPITAL | Age: 34
End: 2024-09-05
Payer: COMMERCIAL

## 2024-09-05 DIAGNOSIS — M72.2 PLANTAR FASCIITIS OF LEFT FOOT: Primary | ICD-10-CM

## 2024-09-05 DIAGNOSIS — R26.2 DIFFICULTY WALKING: ICD-10-CM

## 2024-09-05 PROCEDURE — 97140 MANUAL THERAPY 1/> REGIONS: CPT | Mod: PN

## 2024-09-05 PROCEDURE — 97110 THERAPEUTIC EXERCISES: CPT | Mod: PN

## 2024-09-05 PROCEDURE — 97112 NEUROMUSCULAR REEDUCATION: CPT | Mod: PN

## 2024-09-05 NOTE — PROGRESS NOTES
[facility]   Physical Therapy Treatment Note      Name: Rosangela Salazar  [facility] Number: [id number]    Therapy Diagnosis:   Encounter Diagnoses   Name Primary?    Plantar fasciitis of left foot Yes    Difficulty walking      Physician: Mattie Castro MD    Visit Date: 9/5/2024    Physician Orders: PT Eval and Treat   Medical Diagnosis from Referral: M72.2 (ICD-10-CM) - Plantar fasciitis of left foot  Evaluation Date: 7/2/2024  Authorization Period Expiration: 12/31/2024  Plan of Care Expiration: 10/13/2024  Visit # / Visits authorized: 16/ 31  #Visits based on PHYSICAL THERAPY POC: 24      Foto  Date  Score       Foot   Lower Score = Greater Disability   #1/3 7/2/2024 51.0   #2/3 7/18/2024 51.0    #3/3  8/9/2024  49  FOTO not yet d/c      Time in   4:50pm   Time out   5:52pm   Total time   62 minutes      Precautions: Standard, patient is now 5 years out from cancer treatment   PTA Visit #: 1/5     Subjective   Pain: 0/10  Location: bilateral bottom of L heel     HOME EXERCISE PROGRAM  Reviewed, reports compliance    Response  Foot felt better after manual intervention last visit but pain did not completely go away like it did the time before    Current functional status  Limitation in walking, negotiating stairs    Previous functional status  No running, jumping   Patient stated goal Improve walking tolerance for walking (particularly for work in [employer]).       Objective    8/13/2024:  Observation/posture:   non weight bearing: R arch slightly higher than L arch   Weight bearing: R arch slightly higher than L arch      Gait/assistive device:   Quad cane for longer walks - ex: hiking      Range of Motion: AROM:  Ankle Right  Left    Dorsiflexion 7 10   Plantarflexion 42 48   Inversion 34 20   Eversion 13 12      Knee  Right  Left    Flexion  128 115   Extension  -3 -3      Strength:  Ankle Right  Left    Dorsiflexion 4+/5 4+/5   Plantarflexion 4/5 4/5   Inversion 4+/5 4+/5   Eversion  "4/5 4/5      Hip  Right  Left    Flexion  4/5 4/5   Internal rotation  4/5 4/5   External rotation  4/5 4/5      Knee  Right  Left    Flexion  4/5 4-/5   Extension  4+/5 4-/5      Joint Mobility:     Left   TCJ Hypomobile    STJ Hypomobile       Flexibility:     Left   Gastroc Restricted    Soleus Restricted    Plantar fascia  Restricted, knotting along tissues       Function/balance:     Right Left   Single limb stance 27", 23", 28" 30", 6", 18"       Edema:     Right Left   Ankle figure of eight 52.5 cm 55.0 cm   Ankle joint line 27.0 cm  28.0 cm   METs       Treatment     + indicates new exercise   Bold indicates completed exercise     therapeutic exercises to develop strength, endurance, ROM, flexibility, posture, and core stabilization for 23 minutes:  L soleus stretch, incline board, 30"x5   B gastroc stretch, incline board, 30"x5   L standing plantar fascia stretch, 30"x5   4 way ankle left, green band, 3 x 10  L toe flex, standing on green band, 3x10   B heel raises, airex mat, B HRA, 3x10      manual therapy techniques: Joint mobilizations, Manual traction, Myofacial release, Soft tissue Mobilization, and Friction Massage for 20 minutes:  L STM Plantar Fascia, L Achilles decompression cupping, STM using firm tool at L plantar fascia   L TCJ, STJ Gr III/IV JM  DRY NEEDLING: prep, drape, palpation, education. Application of TDN: Pt educated on benefits and potential side effects of dry needling. Educated pt to use heat following treatment sessions if pt is experiencing pain or soreness. Pt verbalized good understanding of education. Pt gave verbal and written consent for DN. Dry needling to the below listed structures using 15 mm needles:  Achilles (L), plantar fascia      neuromuscular re-education activities to improve: Balance, Coordination, Kinesthetic, Sense, Proprioception, and Posture for 14 minutes:  R/L SLS airex mat, 30"x 5  B arch curls 3" hold, standing on foam 3x10  R/L tandem stance on foam, " "30"x2   Toe yoga, 3x10  L ankle/foot circles on BAPS board, CW/CCW, eversion direction only, 3x10 each way      therapeutic activities to improve functional performance for 5 minutes:  Dynamic heel lifts x 2 laps   Sit<>stands from 18" box 2 airex x +30  Lateral steps, green band at forefoot, x 3 laps      gait training to improve functional mobility and safety for 00 minutes:       Patient Education and Home Exercises       Education provided:   - Educated pt that he/she may feel soreness after session.      Home Exercises Provided:  Instructed patient to continue current home exercise program.  Exercises were reviewed and Rosangela was able to demonstrate them prior to the end of the session.  Rosangela demonstrated good  understanding of the education provided. See Electronic Medical Record under Patient Instructions for exercises provided during therapy sessions    Assessment   Pes planus at L>R impacts current s/s as well as pre-existing condition at L quads. She continues with palpable fascial restriction during manual intervention. STM and decompression cupping implemented at area of L Achilles and plantar fascia.      Pt presents with decreased ROM, muscle strength, flexibility, joint mobility. Increased pain, stiffness, soft tissue restriction. Impaired posture, joint mechanics, balance, gait pattern.      The patient's current task deficits include the following: limitation in ADL, self care, social/recreational tasks.     Rosangela is making fair progress towards meeting set goals.   Patient prognosis is Good.     Patient will continue to benefit from skilled outpatient physical therapy to address the deficits listed in the problem list box on initial evaluation, provide pt/family education and to maximize pt's level of independence in the home and community environment.      Anticipated barriers to physical therapy: chronicity of current injury and co morbidities     Goals:   Short Terms Goals: 3 weeks     Goal  " "Progress  Date    (1)  Patient will be I with HOME EXERCISE PROGRAM  Progressing, not met 9/5/2024      (2)  Patient will obtain 4/5 L hip external rotation MMT to indicate improved ability to complete transitional movements   ongoing, not met 9/5/2024      (3)   Patient will obtain 4/5 L knee flexion to indicate improved ability to walk, > 60 minutes  Ongoing, not met 9/5/2024           Long Term Goals: 6 weeks     Goal  Progress  Date    (1)  Patient will obtain 4+/5 L knee extension MMT to indicate improved ability to stand, > 60 minutes Ongoing, not met   9/5/2024   (2)   Patient will obtain L SLS average of 3 trials < 25" to indicate improved ability to step over obstacles in pathway  Progressing, not met 9/5/2024   (3)   Patient will obtain >115 deg L knee flexion ACTIVE RANGE OF MOTION to indicate improved ability to squat to lift objects, floor - waist level Ongoing, not met 9/5/2024           Plan   Plan of care Certification: 8/13/2024 to 10/13/2024.    Outpatient Physical Therapy 2 times weekly for 7 weeks to include the following interventions: Cervical/Lumbar Traction, Electrical Stimulation re-eval, dry needling, Gait Training, Iontophoresis (with ), Manual Therapy, Moist Heat/ Ice, Neuromuscular Re-ed, Patient Education, Self Care, Therapeutic Activities, and Therapeutic Exercise.      Physical therapist and physical therapy assistant(s) will met face to face to discuss patient's treatment plan and progress towards established goals. Pt will be seen by a physical therapist minimally every 6th visit or every 30 days.    Mirtha Sepulveda, PT                             "

## 2024-09-10 ENCOUNTER — CLINICAL SUPPORT (OUTPATIENT)
Dept: REHABILITATION | Facility: HOSPITAL | Age: 34
End: 2024-09-10
Payer: COMMERCIAL

## 2024-09-10 DIAGNOSIS — M72.2 PLANTAR FASCIITIS OF LEFT FOOT: Primary | ICD-10-CM

## 2024-09-10 DIAGNOSIS — R26.2 DIFFICULTY WALKING: ICD-10-CM

## 2024-09-10 PROCEDURE — 97110 THERAPEUTIC EXERCISES: CPT | Mod: PN

## 2024-09-10 PROCEDURE — 97140 MANUAL THERAPY 1/> REGIONS: CPT | Mod: PN

## 2024-09-10 PROCEDURE — 97112 NEUROMUSCULAR REEDUCATION: CPT | Mod: PN

## 2024-09-10 NOTE — PROGRESS NOTES
[facility]   Physical Therapy Treatment Note      Name: Rosangela Salazar  Clinic Number: [id number]    Therapy Diagnosis:   Encounter Diagnoses   Name Primary?    Plantar fasciitis of left foot Yes    Difficulty walking      Physician: Mattie Castro MD    Visit Date: 9/10/2024    Physician Orders: PT Eval and Treat   Medical Diagnosis from Referral: M72.2 (ICD-10-CM) - Plantar fasciitis of left foot  Evaluation Date: 7/2/2024  Authorization Period Expiration: 12/31/2024  Plan of Care Expiration: 10/13/2024  Visit # / Visits authorized: 17/ 31  #Visits based on PHYSICAL THERAPY POC: 24      Foto  Date  Score       Foot   Lower Score = Greater Disability   #1/3 7/2/2024 51.0   #2/3 7/18/2024 51.0    #3/3  8/9/2024  49  FOTO not yet d/c      Time in   4:05pm   Time out   5:00pm    Total time   55 minutes      Precautions: Standard, patient is now 5 years out from cancer treatment   PTA Visit #: 1/5     Subjective   Pain: 0/10  Location: bilateral bottom of L heel     HOME EXERCISE PROGRAM  Reviewed, reports compliance    Response  Feels a burning pain at distal aspect of L Achilles and plantar heel pain persists. Patient notes that the pain at heel is feeling more intense first thing in the morning.    Current functional status  Limitation in walking, negotiating stairs    Previous functional status  No running, jumping   Patient stated goal Improve walking tolerance for walking (particularly for work in [employer]).       Objective    8/13/2024:  Observation/posture:   non weight bearing: R arch slightly higher than L arch   Weight bearing: R arch slightly higher than L arch      Gait/assistive device:   Quad cane for longer walks - ex: hiking      Range of Motion: AROM:  Ankle Right  Left    Dorsiflexion 7 10   Plantarflexion 42 48   Inversion 34 20   Eversion 13 12      Knee  Right  Left    Flexion  128 115   Extension  -3 -3      Strength:  Ankle Right  Left    Dorsiflexion 4+/5 4+/5  "  Plantarflexion 4/5 4/5   Inversion 4+/5 4+/5   Eversion 4/5 4/5      Hip  Right  Left    Flexion  4/5 4/5   Internal rotation  4/5 4/5   External rotation  4/5 4/5      Knee  Right  Left    Flexion  4/5 4-/5   Extension  4+/5 4-/5      Joint Mobility:     Left   TCJ Hypomobile    STJ Hypomobile       Flexibility:     Left   Gastroc Restricted    Soleus Restricted    Plantar fascia  Restricted, knotting along tissues       Function/balance:     Right Left   Single limb stance 27", 23", 28" 30", 6", 18"       Edema:     Right Left   Ankle figure of eight 52.5 cm 55.0 cm   Ankle joint line 27.0 cm  28.0 cm   METs       Treatment     + indicates new exercise   Bold indicates completed exercise     therapeutic exercises to develop strength, endurance, ROM, flexibility, posture, and core stabilization for 20 minutes:  L soleus stretch, incline board, 30"x5   B gastroc stretch, incline board, 30"x5   L standing plantar fascia stretch, 30"x5   4 way ankle left, green band, 3 x 10  L toe flex, standing on green band, 3x10   B heel raises, airex mat, B HRA, 3x10      manual therapy techniques: Joint mobilizations, Manual traction, Myofacial release, Soft tissue Mobilization, and Friction Massage for 17 minutes:  L STM Plantar Fascia, L Achilles decompression cupping, STM using firm tool at L plantar fascia   L TCJ, STJ Gr III/IV JM  DRY NEEDLING: prep, drape, palpation, education. Application of TDN: Pt educated on benefits and potential side effects of dry needling. Educated pt to use heat following treatment sessions if pt is experiencing pain or soreness. Pt verbalized good understanding of education. Pt gave verbal and written consent for DN. Dry needling to the below listed structures using 15 mm needles:  Achilles (L), plantar fascia      neuromuscular re-education activities to improve: Balance, Coordination, Kinesthetic, Sense, Proprioception, and Posture for 14 minutes:  R/L SLS airex mat, 30"x 5  B arch curls 3" " "hold, standing on foam 3x10  R/L tandem stance on foam, 30"x2   Toe yoga, 3x10  L ankle/foot circles on BAPS board, CW/CCW, eversion direction only, 3x10 each way      therapeutic activities to improve functional performance for 4 minutes:  Dynamic heel lifts x 2 laps   Sit<>stands from 18" box 2 airex x +30  Lateral steps, green band at forefoot, x 3 laps      gait training to improve functional mobility and safety for 00 minutes:       Patient Education and Home Exercises       Education provided:   - Educated pt that he/she may feel soreness after session.      Home Exercises Provided:  Instructed patient to continue current home exercise program.  Exercises were reviewed and Rosangela was able to demonstrate them prior to the end of the session.  Rosangela demonstrated good  understanding of the education provided. See Electronic Medical Record under Patient Instructions for exercises provided during therapy sessions    Assessment   Pes planus at L>R impacts current s/s as well as pre-existing condition at L quads. She continues with palpable fascial restriction at plantar surface of L foot during manual intervention. STM and decompression cupping continued at area of L Achilles and plantar fascia.      Pt presents with decreased ROM, muscle strength, flexibility, joint mobility. Increased pain, stiffness, soft tissue restriction. Impaired posture, joint mechanics, balance, gait pattern.      The patient's current task deficits include the following: limitation in ADL, self care, social/recreational tasks.     Rosangela is making fair progress towards meeting set goals.   Patient prognosis is Good.     Patient will continue to benefit from skilled outpatient physical therapy to address the deficits listed in the problem list box on initial evaluation, provide pt/family education and to maximize pt's level of independence in the home and community environment.      Anticipated barriers to physical therapy: chronicity of " "current injury and co morbidities     Goals:   Short Terms Goals: 3 weeks     Goal  Progress  Date    (1)  Patient will be I with HOME EXERCISE PROGRAM  Progressing, not met 9/10/2024      (2)  Patient will obtain 4/5 L hip external rotation MMT to indicate improved ability to complete transitional movements   ongoing, not met 9/10/2024      (3)   Patient will obtain 4/5 L knee flexion to indicate improved ability to walk, > 60 minutes  Ongoing, not met 9/10/2024           Long Term Goals: 6 weeks     Goal  Progress  Date    (1)  Patient will obtain 4+/5 L knee extension MMT to indicate improved ability to stand, > 60 minutes Ongoing, not met   9/10/2024   (2)   Patient will obtain L SLS average of 3 trials < 25" to indicate improved ability to step over obstacles in pathway  Progressing, not met 9/10/2024   (3)   Patient will obtain >115 deg L knee flexion ACTIVE RANGE OF MOTION to indicate improved ability to squat to lift objects, floor - waist level Ongoing, not met 9/10/2024           Plan   Plan of care Certification: 8/13/2024 to 10/13/2024.    Outpatient Physical Therapy 2 times weekly for 7 weeks to include the following interventions: Cervical/Lumbar Traction, Electrical Stimulation re-eval, dry needling, Gait Training, Iontophoresis (with ), Manual Therapy, Moist Heat/ Ice, Neuromuscular Re-ed, Patient Education, Self Care, Therapeutic Activities, and Therapeutic Exercise.      Physical therapist and physical therapy assistant(s) will met face to face to discuss patient's treatment plan and progress towards established goals. Pt will be seen by a physical therapist minimally every 6th visit or every 30 days.    Mirtha Sepulveda, PT                               "

## 2024-09-12 ENCOUNTER — CLINICAL SUPPORT (OUTPATIENT)
Dept: REHABILITATION | Facility: HOSPITAL | Age: 34
End: 2024-09-12
Payer: COMMERCIAL

## 2024-09-12 DIAGNOSIS — M72.2 PLANTAR FASCIITIS OF LEFT FOOT: Primary | ICD-10-CM

## 2024-09-12 DIAGNOSIS — R26.2 DIFFICULTY WALKING: ICD-10-CM

## 2024-09-12 PROCEDURE — 97110 THERAPEUTIC EXERCISES: CPT | Mod: PN

## 2024-09-12 PROCEDURE — 97140 MANUAL THERAPY 1/> REGIONS: CPT | Mod: PN

## 2024-09-12 PROCEDURE — 97112 NEUROMUSCULAR REEDUCATION: CPT | Mod: PN

## 2024-09-12 NOTE — PROGRESS NOTES
[facility]   Physical Therapy Treatment Note      Name: Rosangela Salazar  Clinic Number: [id number]    Therapy Diagnosis:   Encounter Diagnoses   Name Primary?    Plantar fasciitis of left foot Yes    Difficulty walking      Physician: Mattie Castro MD    Visit Date: 9/12/2024    Physician Orders: PT Eval and Treat   Medical Diagnosis from Referral: M72.2 (ICD-10-CM) - Plantar fasciitis of left foot  Evaluation Date: 7/2/2024  Authorization Period Expiration: 12/31/2024  Plan of Care Expiration: 10/13/2024  Visit # / Visits authorized: 18/ 31  #Visits based on PHYSICAL THERAPY POC: 24      Foto  Date  Score       Foot   Lower Score = Greater Disability   #1/3 7/2/2024 51.0   #2/3 7/18/2024 51.0    #3/3  8/9/2024  49  FOTO not yet d/c      Time in   4:17pm   Time out   5:09pm   Total time   52 minutes      Precautions: Standard, patient is now 5 years out from cancer treatment   PTA Visit #: 1/5     Subjective   Pain: 0/10  Location: bilateral bottom of L heel     HOME EXERCISE PROGRAM  Reviewed, reports compliance    Response  Feels a burning pain at distal aspect of L Achilles and plantar heel pain that is worse depending on her shoe wear - does better with more supportive shoes   Current functional status  Limitation in walking, negotiating stairs    Previous functional status  No running, jumping   Patient stated goal Improve walking tolerance for walking (particularly for work in [employer]).       Objective    8/13/2024:  Observation/posture:   non weight bearing: R arch slightly higher than L arch   Weight bearing: R arch slightly higher than L arch      Gait/assistive device:   Quad cane for longer walks - ex: hiking      Range of Motion: AROM:  Ankle Right  Left    Dorsiflexion 7 10   Plantarflexion 42 48   Inversion 34 20   Eversion 13 12      Knee  Right  Left    Flexion  128 115   Extension  -3 -3      Strength:  Ankle Right  Left    Dorsiflexion 4+/5 4+/5   Plantarflexion  "4/5 4/5   Inversion 4+/5 4+/5   Eversion 4/5 4/5      Hip  Right  Left    Flexion  4/5 4/5   Internal rotation  4/5 4/5   External rotation  4/5 4/5      Knee  Right  Left    Flexion  4/5 4-/5   Extension  4+/5 4-/5      Joint Mobility:     Left   TCJ Hypomobile    STJ Hypomobile       Flexibility:     Left   Gastroc Restricted    Soleus Restricted    Plantar fascia  Restricted, knotting along tissues       Function/balance:     Right Left   Single limb stance 27", 23", 28" 30", 6", 18"       Edema:     Right Left   Ankle figure of eight 52.5 cm 55.0 cm   Ankle joint line 27.0 cm  28.0 cm   METs       Treatment     + indicates new exercise   Bold indicates completed exercise     therapeutic exercises to develop strength, endurance, ROM, flexibility, posture, and core stabilization for 20 minutes:  L soleus stretch, incline board, 30"x5   B gastroc stretch, incline board, 30"x5   L standing plantar fascia stretch, 30"x5   4 way ankle left, green band, 3 x 10  L toe flex, standing on green band, 3x10   B heel raises, airex mat, B HRA, 3x10      manual therapy techniques: Joint mobilizations, Manual traction, Myofacial release, Soft tissue Mobilization, and Friction Massage for 14 minutes:  L STM Plantar Fascia, L Achilles decompression cupping, STM using firm tool at L plantar fascia   L TCJ, STJ Gr III/IV JM  DRY NEEDLING: prep, drape, palpation, education. Application of TDN: Pt educated on benefits and potential side effects of dry needling. Educated pt to use heat following treatment sessions if pt is experiencing pain or soreness. Pt verbalized good understanding of education. Pt gave verbal and written consent for DN. Dry needling to the below listed structures using 15 mm needles:  Achilles (L), plantar fascia      neuromuscular re-education activities to improve: Balance, Coordination, Kinesthetic, Sense, Proprioception, and Posture for 14 minutes:  R/L SLS airex mat, 30"x 5  B arch curls 3" hold, standing on " "foam 3x10  R/L tandem stance on foam, 30"x2   Toe yoga, 3x10  L ankle/foot circles on BAPS board, CW/CCW, eversion direction only, 3x10 each way      therapeutic activities to improve functional performance for 4 minutes:  Dynamic heel lifts x 2 laps   Sit<>stands from 18" box 2 airex x +30  Lateral steps, green band at forefoot, x 3 laps      gait training to improve functional mobility and safety for 00 minutes:       Patient Education and Home Exercises       Education provided:   - Educated pt that he/she may feel soreness after session.      Home Exercises Provided:  Instructed patient to continue current home exercise program.  Exercises were reviewed and Rosangela was able to demonstrate them prior to the end of the session.  Rosangela demonstrated good  understanding of the education provided. See Electronic Medical Record under Patient Instructions for exercises provided during therapy sessions    Assessment   Pes planus at L>R impacts current s/s as well as pre-existing condition at L quads. She continues with palpable fascial restriction at plantar surface of L foot during manual intervention. STM and decompression cupping continued at area of L Achilles and plantar fascia. Emphasis on quads activation during heel lifts with report of feeling decreased reproduction of heel symptoms with additional quads activation.      Pt presents with decreased ROM, muscle strength, flexibility, joint mobility. Increased pain, stiffness, soft tissue restriction. Impaired posture, joint mechanics, balance, gait pattern.      The patient's current task deficits include the following: limitation in ADL, self care, social/recreational tasks.     Rosangela is making fair progress towards meeting set goals.   Patient prognosis is Good.     Patient will continue to benefit from skilled outpatient physical therapy to address the deficits listed in the problem list box on initial evaluation, provide pt/family education and to maximize pt's " "level of independence in the home and community environment.      Anticipated barriers to physical therapy: chronicity of current injury and co morbidities     Goals:   Short Terms Goals: 3 weeks     Goal  Progress  Date    (1)  Patient will be I with HOME EXERCISE PROGRAM  Progressing, not met 9/12/2024      (2)  Patient will obtain 4/5 L hip external rotation MMT to indicate improved ability to complete transitional movements   ongoing, not met 9/12/2024      (3)   Patient will obtain 4/5 L knee flexion to indicate improved ability to walk, > 60 minutes  Ongoing, not met 9/12/2024           Long Term Goals: 6 weeks     Goal  Progress  Date    (1)  Patient will obtain 4+/5 L knee extension MMT to indicate improved ability to stand, > 60 minutes Ongoing, not met   9/12/2024   (2)   Patient will obtain L SLS average of 3 trials < 25" to indicate improved ability to step over obstacles in pathway  Progressing, not met 9/12/2024   (3)   Patient will obtain >115 deg L knee flexion ACTIVE RANGE OF MOTION to indicate improved ability to squat to lift objects, floor - waist level Ongoing, not met 9/12/2024           Plan   Plan of care Certification: 8/13/2024 to 10/13/2024.    Outpatient Physical Therapy 2 times weekly for 7 weeks to include the following interventions: Cervical/Lumbar Traction, Electrical Stimulation re-eval, dry needling, Gait Training, Iontophoresis (with ), Manual Therapy, Moist Heat/ Ice, Neuromuscular Re-ed, Patient Education, Self Care, Therapeutic Activities, and Therapeutic Exercise.      Physical therapist and physical therapy assistant(s) will met face to face to discuss patient's treatment plan and progress towards established goals. Pt will be seen by a physical therapist minimally every 6th visit or every 30 days.    Mirtha Sepulveda, PT                                 "

## 2024-09-17 ENCOUNTER — CLINICAL SUPPORT (OUTPATIENT)
Dept: REHABILITATION | Facility: HOSPITAL | Age: 34
End: 2024-09-17
Payer: COMMERCIAL

## 2024-09-17 DIAGNOSIS — M72.2 PLANTAR FASCIITIS OF LEFT FOOT: Primary | ICD-10-CM

## 2024-09-17 DIAGNOSIS — R26.2 DIFFICULTY WALKING: ICD-10-CM

## 2024-09-17 PROCEDURE — 97110 THERAPEUTIC EXERCISES: CPT | Mod: PN

## 2024-09-17 PROCEDURE — 97112 NEUROMUSCULAR REEDUCATION: CPT | Mod: PN

## 2024-09-17 PROCEDURE — 97140 MANUAL THERAPY 1/> REGIONS: CPT | Mod: PN

## 2024-09-17 NOTE — PROGRESS NOTES
[facility]   Physical Therapy Progress Note      Name: Rosangela Salazar  Clinic Number: [id number]    Therapy Diagnosis:   Encounter Diagnoses   Name Primary?    Plantar fasciitis of left foot Yes    Difficulty walking      Physician: Mattie Castro MD    Visit Date: 9/17/2024    Physician Orders: PT Eval and Treat   Medical Diagnosis from Referral: M72.2 (ICD-10-CM) - Plantar fasciitis of left foot  Evaluation Date: 7/2/2024  Authorization Period Expiration: 12/31/2024  Plan of Care Expiration: 11/17/2024  Visit # / Visits authorized: 19/ 31  #Visits based on PHYSICAL THERAPY POC: 24      Foto  Date  Score       Foot   Lower Score = Greater Disability   #1/3 7/2/2024 51.0   #2/3 7/18/2024 51.0    #3/3  8/9/2024  49  FOTO not yet d/c      Time in   4:00pm   Time out   5:11pm   Total time   71 minutes      Precautions: Standard, patient is now 5 years out from cancer treatment   PTA Visit #: 1/5     Subjective   Pain: 0/10  Location: bilateral bottom of L heel     HOME EXERCISE PROGRAM  Reviewed, reports compliance    Response  Ankle and foot feels better as long as she is wearing appropriate shoe wear    Current functional status  Limitation in walking, negotiating stairs    Previous functional status  No running, jumping   Patient stated goal Improve walking tolerance for walking (particularly for work in [employer]).       Objective    9/17/2024:  Observation/posture:   non weight bearing: R arch slightly higher than L arch   Weight bearing: R arch slightly higher than L arch      Gait/assistive device:   Quad cane for longer walks - ex: hiking      Range of Motion: AROM:  Ankle Right  Left    Dorsiflexion 10 13   Plantarflexion 42 45   Inversion 34 33   Eversion 13 19      Knee  Right  Left    Flexion  128 116   Extension  -3 -3      Strength:  Ankle Right  Left    Dorsiflexion 4+/5 4+/5   Plantarflexion 4/5 4+/5   Inversion 4+/5 4+/5   Eversion 4/5 4+/5      Hip  Right  Left   "  Flexion  4/5 4/5   Internal rotation  4/5 4/5   External rotation  4/5 4/5      Knee  Right  Left    Flexion  4/5 4/5   Extension  4+/5 4-/5      Joint Mobility:     Left   TCJ Hypomobile    STJ Hypomobile       Flexibility:     Left   Gastroc Restricted    Soleus Restricted    Plantar fascia  Restricted, knotting along tissues       Function/balance:     Right Left   Single limb stance 27", 23", 28" 30", 6", 18"       Edema:     Right Left   Ankle figure of eight 52.5 cm 55.0 cm   Ankle joint line 27.0 cm  29.0 cm     Treatment     + indicates new exercise   Bold indicates completed exercise     therapeutic exercises to develop strength, endurance, ROM, flexibility, posture, and core stabilization for 28 minutes:  L soleus stretch, incline board, 30"x5   B gastroc stretch, incline board, 30"x5   L standing plantar fascia stretch, 30"x5   4 way ankle left, green band, 3 x 10  L toe flex, standing on green band, 3x10   B heel raises on 6inch step with R/L LE lowering, B HRA, 2x10   Updated PHYSICAL THERAPY POC     manual therapy techniques: Joint mobilizations, Manual traction, Myofacial release, Soft tissue Mobilization, and Friction Massage for 25 minutes:  L STM Plantar Fascia, L Achilles decompression cupping, STM using firm tool at L plantar fascia   L TCJ, STJ Gr III/IV JM  DRY NEEDLING: prep, drape, palpation, education. Application of TDN: Pt educated on benefits and potential side effects of dry needling. Educated pt to use heat following treatment sessions if pt is experiencing pain or soreness. Pt verbalized good understanding of education. Pt gave verbal and written consent for DN. Dry needling to the below listed structures using 15 mm needles:  Achilles (L), plantar fascia      neuromuscular re-education activities to improve: Balance, Coordination, Kinesthetic, Sense, Proprioception, and Posture for 14 minutes:  R/L SLS airex mat, 30"x 5  B arch dome 3" hold, standing on foam 3x10  R/L tandem stance " "on foam, 30"x2   Toe yoga, 3x10  L ankle/foot circles on BAPS board, CW/CCW, eversion direction only, 3x10 each way      therapeutic activities to improve functional performance for 4 minutes:  Dynamic heel lifts x 2 laps   Sit<>stands from 18" box 2 airex x +30  Lateral steps, green band at forefoot, x 3 laps      gait training to improve functional mobility and safety for 00 minutes:       Patient Education and Home Exercises       Education provided:   - Educated pt that he/she may feel soreness after session.      Home Exercises Provided:  Instructed patient to continue current home exercise program.  Exercises were reviewed and Rosangela was able to demonstrate them prior to the end of the session.  Rosangela demonstrated good  understanding of the education provided. See Electronic Medical Record under Patient Instructions for exercises provided during therapy sessions    Assessment   Pes planus at L>R impacts current s/s as well as pre-existing condition at L quads. She continues with palpable fascial restriction at plantar surface of L foot during manual intervention. STM and decompression cupping continued at area of L Achilles and plantar fascia. Emphasis on quads activation during heel lifts with report of feeling decreased reproduction of heel symptoms with additional quads activation. Edema fluctuates somewhat, but ROM is improving.      Pt presents with decreased ROM, muscle strength, flexibility, joint mobility. Increased pain, stiffness, soft tissue restriction. Impaired posture, joint mechanics, balance, gait pattern.      The patient's current task deficits include the following: limitation in ADL, self care, social/recreational tasks.     Rosangela is making fair progress towards meeting set goals.   Patient prognosis is Good.     Patient will continue to benefit from skilled outpatient physical therapy to address the deficits listed in the problem list box on initial evaluation, provide pt/family education " "and to maximize pt's level of independence in the home and community environment.      Anticipated barriers to physical therapy: chronicity of current injury and co morbidities     Goals:   Short Terms Goals: 3 weeks     Goal  Progress  Date    (1)  Patient will be I with HOME EXERCISE PROGRAM  Progressing, not met 9/17/2024      (2)  Patient will obtain 4/5 L hip external rotation MMT to indicate improved ability to complete transitional movements   ongoing, not met 9/17/2024      (3)   Patient will obtain 4/5 L knee flexion to indicate improved ability to walk, > 60 minutes  Ongoing, not met 9/17/2024           Long Term Goals: 6 weeks     Goal  Progress  Date    (1)  Patient will obtain 4+/5 L knee extension MMT to indicate improved ability to stand, > 60 minutes Ongoing, not met   9/17/2024   (2)   Patient will obtain L SLS average of 3 trials < 25" to indicate improved ability to step over obstacles in pathway  Progressing, not met 9/17/2024   (3)   Patient will obtain >115 deg L knee flexion ACTIVE RANGE OF MOTION to indicate improved ability to squat to lift objects, floor - waist level Ongoing, not met 9/17/2024           Plan   Plan of care Certification: 9/17/2024 to 11/17/2024.    Outpatient Physical Therapy 2 times weekly for 2-3 weeks to include the following interventions: Cervical/Lumbar Traction, Electrical Stimulation re-eval, dry needling, Gait Training, Iontophoresis (with ), Manual Therapy, Moist Heat/ Ice, Neuromuscular Re-ed, Patient Education, Self Care, Therapeutic Activities, and Therapeutic Exercise.      Physical therapist and physical therapy assistant(s) will met face to face to discuss patient's treatment plan and progress towards established goals. Pt will be seen by a physical therapist minimally every 6th visit or every 30 days.    Mirtha Sepulveda, PT     I CERTIFY THE NEED FOR THESE SERVICES FURNISHED UNDER THIS PLAN OF TREATMENT AND WHILE UNDER MY CARE     Physician's comments:   "         Physician's Signature: ___________________________________________________

## 2024-09-19 ENCOUNTER — CLINICAL SUPPORT (OUTPATIENT)
Dept: REHABILITATION | Facility: HOSPITAL | Age: 34
End: 2024-09-19
Payer: COMMERCIAL

## 2024-09-19 DIAGNOSIS — M72.2 PLANTAR FASCIITIS OF LEFT FOOT: Primary | ICD-10-CM

## 2024-09-19 DIAGNOSIS — R26.2 DIFFICULTY WALKING: ICD-10-CM

## 2024-09-19 PROCEDURE — 97112 NEUROMUSCULAR REEDUCATION: CPT | Mod: PN

## 2024-09-19 PROCEDURE — 97530 THERAPEUTIC ACTIVITIES: CPT | Mod: PN

## 2024-09-19 PROCEDURE — 97110 THERAPEUTIC EXERCISES: CPT | Mod: PN

## 2024-09-19 PROCEDURE — 97140 MANUAL THERAPY 1/> REGIONS: CPT | Mod: PN

## 2024-09-19 NOTE — PLAN OF CARE
[facility]   Physical Therapy Progress Note      Name: Rosangela Salazar  Clinic Number: [id number]    Therapy Diagnosis:   Encounter Diagnoses   Name Primary?    Plantar fasciitis of left foot Yes    Difficulty walking      Physician: Mattie Castro MD    Visit Date: 9/17/2024    Physician Orders: PT Eval and Treat   Medical Diagnosis from Referral: M72.2 (ICD-10-CM) - Plantar fasciitis of left foot  Evaluation Date: 7/2/2024  Authorization Period Expiration: 12/31/2024  Plan of Care Expiration: 11/17/2024  Visit # / Visits authorized: 19/ 31  #Visits based on PHYSICAL THERAPY POC: 24      Foto  Date  Score       Foot   Lower Score = Greater Disability   #1/3 7/2/2024 51.0   #2/3 7/18/2024 51.0    #3/3  8/9/2024  49  FOTO not yet d/c      Time in   4:00pm   Time out   5:11pm   Total time   71 minutes      Precautions: Standard, patient is now 5 years out from cancer treatment   PTA Visit #: 1/5     Subjective   Pain: 0/10  Location: bilateral bottom of L heel     HOME EXERCISE PROGRAM  Reviewed, reports compliance    Response  Ankle and foot feels better as long as she is wearing appropriate shoe wear    Current functional status  Limitation in walking, negotiating stairs    Previous functional status  No running, jumping   Patient stated goal Improve walking tolerance for walking (particularly for work in [employer]).       Objective    9/17/2024:  Observation/posture:   non weight bearing: R arch slightly higher than L arch   Weight bearing: R arch slightly higher than L arch      Gait/assistive device:   Quad cane for longer walks - ex: hiking      Range of Motion: AROM:  Ankle Right  Left    Dorsiflexion 10 13   Plantarflexion 42 45   Inversion 34 33   Eversion 13 19      Knee  Right  Left    Flexion  128 116   Extension  -3 -3      Strength:  Ankle Right  Left    Dorsiflexion 4+/5 4+/5   Plantarflexion 4/5 4+/5   Inversion 4+/5 4+/5   Eversion 4/5 4+/5      Hip  Right  Left   "  Flexion  4/5 4/5   Internal rotation  4/5 4/5   External rotation  4/5 4/5      Knee  Right  Left    Flexion  4/5 4/5   Extension  4+/5 4-/5      Joint Mobility:     Left   TCJ Hypomobile    STJ Hypomobile       Flexibility:     Left   Gastroc Restricted    Soleus Restricted    Plantar fascia  Restricted, knotting along tissues       Function/balance:     Right Left   Single limb stance 27", 23", 28" 30", 6", 18"       Edema:     Right Left   Ankle figure of eight 52.5 cm 55.0 cm   Ankle joint line 27.0 cm  29.0 cm     Treatment     + indicates new exercise   Bold indicates completed exercise     therapeutic exercises to develop strength, endurance, ROM, flexibility, posture, and core stabilization for 28 minutes:  L soleus stretch, incline board, 30"x5   B gastroc stretch, incline board, 30"x5   L standing plantar fascia stretch, 30"x5   4 way ankle left, green band, 3 x 10  L toe flex, standing on green band, 3x10   B heel raises on 6inch step with R/L LE lowering, B HRA, 2x10   Updated PHYSICAL THERAPY POC     manual therapy techniques: Joint mobilizations, Manual traction, Myofacial release, Soft tissue Mobilization, and Friction Massage for 25 minutes:  L STM Plantar Fascia, L Achilles decompression cupping, STM using firm tool at L plantar fascia   L TCJ, STJ Gr III/IV JM  DRY NEEDLING: prep, drape, palpation, education. Application of TDN: Pt educated on benefits and potential side effects of dry needling. Educated pt to use heat following treatment sessions if pt is experiencing pain or soreness. Pt verbalized good understanding of education. Pt gave verbal and written consent for DN. Dry needling to the below listed structures using 15 mm needles:  Achilles (L), plantar fascia      neuromuscular re-education activities to improve: Balance, Coordination, Kinesthetic, Sense, Proprioception, and Posture for 14 minutes:  R/L SLS airex mat, 30"x 5  B arch dome 3" hold, standing on foam 3x10  R/L tandem stance " "on foam, 30"x2   Toe yoga, 3x10  L ankle/foot circles on BAPS board, CW/CCW, eversion direction only, 3x10 each way      therapeutic activities to improve functional performance for 4 minutes:  Dynamic heel lifts x 2 laps   Sit<>stands from 18" box 2 airex x +30  Lateral steps, green band at forefoot, x 3 laps      gait training to improve functional mobility and safety for 00 minutes:       Patient Education and Home Exercises       Education provided:   - Educated pt that he/she may feel soreness after session.      Home Exercises Provided:  Instructed patient to continue current home exercise program.  Exercises were reviewed and Rosangela was able to demonstrate them prior to the end of the session.  Rosangela demonstrated good  understanding of the education provided. See Electronic Medical Record under Patient Instructions for exercises provided during therapy sessions    Assessment   Pes planus at L>R impacts current s/s as well as pre-existing condition at L quads. She continues with palpable fascial restriction at plantar surface of L foot during manual intervention. STM and decompression cupping continued at area of L Achilles and plantar fascia. Emphasis on quads activation during heel lifts with report of feeling decreased reproduction of heel symptoms with additional quads activation. Edema fluctuates somewhat, but ROM is improving.      Pt presents with decreased ROM, muscle strength, flexibility, joint mobility. Increased pain, stiffness, soft tissue restriction. Impaired posture, joint mechanics, balance, gait pattern.      The patient's current task deficits include the following: limitation in ADL, self care, social/recreational tasks.     Rosangela is making fair progress towards meeting set goals.   Patient prognosis is Good.     Patient will continue to benefit from skilled outpatient physical therapy to address the deficits listed in the problem list box on initial evaluation, provide pt/family education " "and to maximize pt's level of independence in the home and community environment.      Anticipated barriers to physical therapy: chronicity of current injury and co morbidities     Goals:   Short Terms Goals: 3 weeks     Goal  Progress  Date    (1)  Patient will be I with HOME EXERCISE PROGRAM  Progressing, not met 9/17/2024      (2)  Patient will obtain 4/5 L hip external rotation MMT to indicate improved ability to complete transitional movements   ongoing, not met 9/17/2024      (3)   Patient will obtain 4/5 L knee flexion to indicate improved ability to walk, > 60 minutes  Ongoing, not met 9/17/2024           Long Term Goals: 6 weeks     Goal  Progress  Date    (1)  Patient will obtain 4+/5 L knee extension MMT to indicate improved ability to stand, > 60 minutes Ongoing, not met   9/17/2024   (2)   Patient will obtain L SLS average of 3 trials < 25" to indicate improved ability to step over obstacles in pathway  Progressing, not met 9/17/2024   (3)   Patient will obtain >115 deg L knee flexion ACTIVE RANGE OF MOTION to indicate improved ability to squat to lift objects, floor - waist level Ongoing, not met 9/17/2024           Plan   Plan of care Certification: 9/17/2024 to 11/17/2024.    Outpatient Physical Therapy 2 times weekly for 2-3 weeks to include the following interventions: Cervical/Lumbar Traction, Electrical Stimulation re-eval, dry needling, Gait Training, Iontophoresis (with ), Manual Therapy, Moist Heat/ Ice, Neuromuscular Re-ed, Patient Education, Self Care, Therapeutic Activities, and Therapeutic Exercise.      Physical therapist and physical therapy assistant(s) will met face to face to discuss patient's treatment plan and progress towards established goals. Pt will be seen by a physical therapist minimally every 6th visit or every 30 days.    Mirtha Sepulveda, PT     I CERTIFY THE NEED FOR THESE SERVICES FURNISHED UNDER THIS PLAN OF TREATMENT AND WHILE UNDER MY CARE     Physician's comments:   "         Physician's Signature: ___________________________________________________

## 2024-09-19 NOTE — PROGRESS NOTES
[facility]   Physical Therapy Treatment Note      Name: Rosangela Salazar  Clinic Number: [id number]    Therapy Diagnosis:   Encounter Diagnoses   Name Primary?    Plantar fasciitis of left foot Yes    Difficulty walking      Physician: Mattie Castro MD    Visit Date: 9/19/2024    Physician Orders: PT Eval and Treat   Medical Diagnosis from Referral: M72.2 (ICD-10-CM) - Plantar fasciitis of left foot  Evaluation Date: 7/2/2024  Authorization Period Expiration: 12/31/2024  Plan of Care Expiration: 11/17/2024  Visit # / Visits authorized: 20/ 31  #Visits based on PHYSICAL THERAPY POC: 24      Foto  Date  Score       Foot   Lower Score = Greater Disability   #1/3 7/2/2024 51.0   #2/3 7/18/2024 51.0    #3/3  8/9/2024  49  FOTO not yet d/c      Time in   4:01pm   Time out   5:00pm   Total time   59 minutes      Precautions: Standard, patient is now 5 years out from cancer treatment   PTA Visit #: 1/5     Subjective   Pain: 0/10  Location: bilateral bottom of L heel     HOME EXERCISE PROGRAM  Reviewed, reports compliance    Response  Felt soreness    Current functional status  Limitation in walking, negotiating stairs    Previous functional status  No running, jumping   Patient stated goal Improve walking tolerance for walking (particularly for work in [employer]).       Objective    9/17/2024:  Observation/posture:   non weight bearing: R arch slightly higher than L arch   Weight bearing: R arch slightly higher than L arch      Gait/assistive device:   Quad cane for longer walks - ex: hiking      Range of Motion: AROM:  Ankle Right  Left    Dorsiflexion 10 13   Plantarflexion 42 45   Inversion 34 33   Eversion 13 19      Knee  Right  Left    Flexion  128 116   Extension  -3 -3      Strength:  Ankle Right  Left    Dorsiflexion 4+/5 4+/5   Plantarflexion 4/5 4+/5   Inversion 4+/5 4+/5   Eversion 4/5 4+/5      Hip  Right  Left    Flexion  4/5 4/5   Internal rotation  4/5 4/5   External rotation   "4/5 4/5      Knee  Right  Left    Flexion  4/5 4/5   Extension  4+/5 4-/5      Joint Mobility:     Left   TCJ Hypomobile    STJ Hypomobile       Flexibility:     Left   Gastroc Restricted    Soleus Restricted    Plantar fascia  Restricted, knotting along tissues       Function/balance:     Right Left   Single limb stance 27", 23", 28" 30", 6", 18"       Edema:     Right Left   Ankle figure of eight 52.5 cm 55.0 cm   Ankle joint line 27.0 cm  29.0 cm     Treatment     + indicates new exercise   Bold indicates completed exercise     therapeutic exercises to develop strength, endurance, ROM, flexibility, posture, and core stabilization for 11 minutes:  L soleus stretch, incline board, 30"x5   B gastroc stretch, incline board, 30"x5   L standing plantar fascia stretch, 30"x5   4 way ankle left, green band, 3 x 10  L toe flex, standing on green band, 3x10   B heel raises on 6inch step with R/L LE lowering, B HRA, 2x10 - on hold today due to soreness from completing this last visit     manual therapy techniques: Joint mobilizations, Manual traction, Myofacial release, Soft tissue Mobilization, and Friction Massage for 20 minutes:  L STM Plantar Fascia, L Achilles decompression cupping, STM using firm tool at L plantar fascia   L TCJ, STJ Gr III/IV JM  DRY NEEDLING: prep, drape, palpation, education. Application of TDN: Pt educated on benefits and potential side effects of dry needling. Educated pt to use heat following treatment sessions if pt is experiencing pain or soreness. Pt verbalized good understanding of education. Pt gave verbal and written consent for DN. Dry needling to the below listed structures using 15 mm needles:  Achilles (L), plantar fascia      neuromuscular re-education activities to improve: Balance, Coordination, Kinesthetic, Sense, Proprioception, and Posture for 13 minutes:  R/L SLS airex mat, 30"x 5  B arch dome 3" hold, standing on foam 3x10  R/L tandem stance on foam, 30"x2   Toe yoga, 3x10  L " "ankle/foot circles on BAPS board, CW/CCW, eversion direction only, 3x10 each way      therapeutic activities to improve functional performance for 15 minutes:  Dynamic heel lifts x 1 lap  +Leg press #75, 3x10  Sit<>stands from 18" box 2 airex x +30  Lateral steps, green band at forefoot, x 3 laps      gait training to improve functional mobility and safety for 00 minutes:       Patient Education and Home Exercises       Education provided:   - Educated pt that he/she may feel soreness after session.      Home Exercises Provided:  Instructed patient to continue current home exercise program.  Exercises were reviewed and Rosangela was able to demonstrate them prior to the end of the session.  Rosangela demonstrated good  understanding of the education provided. See Electronic Medical Record under Patient Instructions for exercises provided during therapy sessions    Assessment   Heel raises placed on hold today due to soreness at calves following exercise progressions last visit. Patient completes B leg press on shuttle machine with #75 resistance today.      Pt presents with decreased ROM, muscle strength, flexibility, joint mobility. Increased pain, stiffness, soft tissue restriction. Impaired posture, joint mechanics, balance, gait pattern.      The patient's current task deficits include the following: limitation in ADL, self care, social/recreational tasks.     Rosangela is making fair progress towards meeting set goals.   Patient prognosis is Good.     Patient will continue to benefit from skilled outpatient physical therapy to address the deficits listed in the problem list box on initial evaluation, provide pt/family education and to maximize pt's level of independence in the home and community environment.      Anticipated barriers to physical therapy: chronicity of current injury and co morbidities     Goals:   Short Terms Goals: 3 weeks     Goal  Progress  Date    (1)  Patient will be I with HOME EXERCISE PROGRAM  " "Progressing, not met 9/19/2024      (2)  Patient will obtain 4/5 L hip external rotation MMT to indicate improved ability to complete transitional movements   ongoing, not met 9/19/2024      (3)   Patient will obtain 4/5 L knee flexion to indicate improved ability to walk, > 60 minutes  Ongoing, not met 9/19/2024           Long Term Goals: 6 weeks     Goal  Progress  Date    (1)  Patient will obtain 4+/5 L knee extension MMT to indicate improved ability to stand, > 60 minutes Ongoing, not met   9/19/2024   (2)   Patient will obtain L SLS average of 3 trials < 25" to indicate improved ability to step over obstacles in pathway  Progressing, not met 9/19/2024   (3)   Patient will obtain >115 deg L knee flexion ACTIVE RANGE OF MOTION to indicate improved ability to squat to lift objects, floor - waist level Ongoing, not met 9/19/2024           Plan   Plan of care Certification: 9/17/2024 to 11/17/2024.    Outpatient Physical Therapy 2 times weekly for 2-3 weeks to include the following interventions: Cervical/Lumbar Traction, Electrical Stimulation re-eval, dry needling, Gait Training, Iontophoresis (with ), Manual Therapy, Moist Heat/ Ice, Neuromuscular Re-ed, Patient Education, Self Care, Therapeutic Activities, and Therapeutic Exercise.      Physical therapist and physical therapy assistant(s) will met face to face to discuss patient's treatment plan and progress towards established goals. Pt will be seen by a physical therapist minimally every 6th visit or every 30 days.    Mirtha Sepulveda, PT                                       "

## 2024-09-24 ENCOUNTER — CLINICAL SUPPORT (OUTPATIENT)
Dept: REHABILITATION | Facility: HOSPITAL | Age: 34
End: 2024-09-24
Payer: COMMERCIAL

## 2024-09-24 DIAGNOSIS — R26.2 DIFFICULTY WALKING: ICD-10-CM

## 2024-09-24 DIAGNOSIS — M72.2 PLANTAR FASCIITIS OF LEFT FOOT: Primary | ICD-10-CM

## 2024-09-24 PROCEDURE — 97530 THERAPEUTIC ACTIVITIES: CPT | Mod: PN

## 2024-09-24 PROCEDURE — 97112 NEUROMUSCULAR REEDUCATION: CPT | Mod: PN

## 2024-09-24 PROCEDURE — 97140 MANUAL THERAPY 1/> REGIONS: CPT | Mod: PN

## 2024-09-24 PROCEDURE — 97110 THERAPEUTIC EXERCISES: CPT | Mod: PN

## 2024-09-24 NOTE — PROGRESS NOTES
[facility]   Physical Therapy Treatment Note      Name: Rosangela Salazar  Clinic Number: [id number]    Therapy Diagnosis:   Encounter Diagnoses   Name Primary?    Plantar fasciitis of left foot Yes    Difficulty walking      Physician: Mattie Castro MD    Visit Date: 9/24/2024    Physician Orders: PT Eval and Treat   Medical Diagnosis from Referral: M72.2 (ICD-10-CM) - Plantar fasciitis of left foot  Evaluation Date: 7/2/2024  Authorization Period Expiration: 12/31/2024  Plan of Care Expiration: 11/17/2024  Visit # / Visits authorized: 21/ 31  #Visits based on PHYSICAL THERAPY POC: 24      Foto  Date  Score       Foot   Lower Score = Greater Disability   #1/3 7/2/2024 51.0   #2/3 7/18/2024 51.0    #3/3  8/9/2024  49  FOTO not yet d/c      Time in   4:05pm   Time out   5:28pm   Total time   83 minutes      Precautions: Standard, patient is now 5 years out from cancer treatment   PTA Visit #: 1/5     Subjective   Pain: 0/10  Location: bilateral bottom of L heel     HOME EXERCISE PROGRAM  Reviewed, reports compliance    Response  L foot has issue with cramping for close to 8 minutes while trying to sleep between visits. Cramping finally release with drinking of electrolytes and walking around.    Current functional status  Limitation in walking, negotiating stairs. Has been staying on her toes when using elliptical.     Previous functional status  No running, jumping   Patient stated goal Improve walking tolerance for walking (particularly for work in [employer]).       Objective    9/17/2024:  Observation/posture:   non weight bearing: R arch slightly higher than L arch   Weight bearing: R arch slightly higher than L arch      Gait/assistive device:   Quad cane for longer walks - ex: hiking      Range of Motion: AROM:  Ankle Right  Left    Dorsiflexion 10 13   Plantarflexion 42 45   Inversion 34 33   Eversion 13 19      Knee  Right  Left    Flexion  128 116   Extension  -3 -3     "  Strength:  Ankle Right  Left    Dorsiflexion 4+/5 4+/5   Plantarflexion 4/5 4+/5   Inversion 4+/5 4+/5   Eversion 4/5 4+/5      Hip  Right  Left    Flexion  4/5 4/5   Internal rotation  4/5 4/5   External rotation  4/5 4/5      Knee  Right  Left    Flexion  4/5 4/5   Extension  4+/5 4-/5      Joint Mobility:     Left   TCJ Hypomobile    STJ Hypomobile       Flexibility:     Left   Gastroc Restricted    Soleus Restricted    Plantar fascia  Restricted, knotting along tissues       Function/balance:     Right Left   Single limb stance 27", 23", 28" 30", 6", 18"       Edema:     Right Left   Ankle figure of eight 52.5 cm 55.0 cm   Ankle joint line 27.0 cm  29.0 cm     Treatment     + indicates new exercise   Bold indicates completed exercise     therapeutic exercises to develop strength, endurance, ROM, flexibility, posture, and core stabilization for 20 minutes:  L soleus stretch, incline board, 30"x5   B gastroc stretch, incline board, 30"x5   L standing plantar fascia stretch, 30"x5   4 way ankle left, green band, 3 x 10  L toe flex, standing on green band, 3x10   B heel raises on 6inch step with R/L LE lowering, B HRA, 2x10   +Stool pull, 2 laps      manual therapy techniques: Joint mobilizations, Manual traction, Myofacial release, Soft tissue Mobilization, and Friction Massage for 20 minutes:  L STM Plantar Fascia, L Achilles decompression cupping, STM using firm tool at L plantar fascia   L TCJ, STJ Gr III/IV JM  DRY NEEDLING: prep, drape, palpation, education. Application of TDN: Pt educated on benefits and potential side effects of dry needling. Educated pt to use heat following treatment sessions if pt is experiencing pain or soreness. Pt verbalized good understanding of education. Pt gave verbal and written consent for DN. Dry needling to the below listed structures using 15 mm needles:  Achilles (L), plantar fascia      neuromuscular re-education activities to improve: Balance, Coordination, Kinesthetic, " "Sense, Proprioception, and Posture for 16 minutes:  R/L SLS airex mat +3 point cone tap, 30"x 3 each  B arch dome 3" hold, standing on foam 3x10  R/L tandem stance on foam, 30"x2   Toe yoga, 3x10  L ankle/foot circles on BAPS board, CW/CCW, eversion direction only, 3x10 each way   +Supine L LE peroneal nerve glides, 3x10     therapeutic activities to improve functional performance for 27 minutes:  Dynamic heel lifts x 1 lap  Leg press #75, 5" hold each way, 3x10  Sit<>stands from 18" box 2 airex x +30  Lateral steps, green band at forefoot, x 3 laps      gait training to improve functional mobility and safety for 00 minutes:       Patient Education and Home Exercises       Education provided:   - Educated pt that he/she may feel soreness after session.      Home Exercises Provided:  Instructed patient to continue current home exercise program. Exercises were reviewed and Rosangela was able to demonstrate them prior to the end of the session.  Rosangela demonstrated good  understanding of the education provided. See Electronic Medical Record under Patient Instructions for exercises provided during therapy sessions    Assessment   Review keeping heels down when using elliptical between visits to see if that helps to minimize pull on plantar fascia. Functional strengthening progressed today with no increase of plantar fascia or Achilles symptoms beyond baseline.      Pt presents with decreased ROM, muscle strength, flexibility, joint mobility. Increased pain, stiffness, soft tissue restriction. Impaired posture, joint mechanics, balance, gait pattern.      The patient's current task deficits include the following: limitation in ADL, self care, social/recreational tasks.     Rosangela is making fair progress towards meeting set goals.   Patient prognosis is Good.     Patient will continue to benefit from skilled outpatient physical therapy to address the deficits listed in the problem list box on initial evaluation, provide " "pt/family education and to maximize pt's level of independence in the home and community environment.      Anticipated barriers to physical therapy: chronicity of current injury and co morbidities     Goals:   Short Terms Goals: 3 weeks     Goal  Progress  Date    (1)  Patient will be I with HOME EXERCISE PROGRAM  Progressing, not met 9/24/2024      (2)  Patient will obtain 4/5 L hip external rotation MMT to indicate improved ability to complete transitional movements   ongoing, not met 9/24/2024      (3)   Patient will obtain 4/5 L knee flexion to indicate improved ability to walk, > 60 minutes  Ongoing, not met 9/24/2024           Long Term Goals: 6 weeks     Goal  Progress  Date    (1)  Patient will obtain 4+/5 L knee extension MMT to indicate improved ability to stand, > 60 minutes Ongoing, not met   9/24/2024   (2)   Patient will obtain L SLS average of 3 trials < 25" to indicate improved ability to step over obstacles in pathway  Progressing, not met 9/24/2024   (3)   Patient will obtain >115 deg L knee flexion ACTIVE RANGE OF MOTION to indicate improved ability to squat to lift objects, floor - waist level Ongoing, not met 9/24/2024           Plan   Plan of care Certification: 9/17/2024 to 11/17/2024.    Outpatient Physical Therapy 2 times weekly for 2-3 weeks to include the following interventions: Cervical/Lumbar Traction, Electrical Stimulation re-eval, dry needling, Gait Training, Iontophoresis (with ), Manual Therapy, Moist Heat/ Ice, Neuromuscular Re-ed, Patient Education, Self Care, Therapeutic Activities, and Therapeutic Exercise.      Physical therapist and physical therapy assistant(s) will met face to face to discuss patient's treatment plan and progress towards established goals. Pt will be seen by a physical therapist minimally every 6th visit or every 30 days.    Mirtha Sepulveda, PT                                         "

## 2024-09-26 ENCOUNTER — CLINICAL SUPPORT (OUTPATIENT)
Dept: REHABILITATION | Facility: HOSPITAL | Age: 34
End: 2024-09-26
Payer: COMMERCIAL

## 2024-09-26 DIAGNOSIS — M72.2 PLANTAR FASCIITIS OF LEFT FOOT: Primary | ICD-10-CM

## 2024-09-26 DIAGNOSIS — R26.2 DIFFICULTY WALKING: ICD-10-CM

## 2024-09-26 PROCEDURE — 97112 NEUROMUSCULAR REEDUCATION: CPT | Mod: PN

## 2024-09-26 PROCEDURE — 97110 THERAPEUTIC EXERCISES: CPT | Mod: PN

## 2024-09-26 PROCEDURE — 97530 THERAPEUTIC ACTIVITIES: CPT | Mod: PN

## 2024-09-26 PROCEDURE — 97140 MANUAL THERAPY 1/> REGIONS: CPT | Mod: PN

## 2024-09-26 NOTE — PROGRESS NOTES
[facility]   Physical Therapy Treatment Note      Name: Rosangela Salazar  Clinic Number: [id number]    Therapy Diagnosis:   Encounter Diagnoses   Name Primary?    Plantar fasciitis of left foot Yes    Difficulty walking      Physician: Mattie Castro MD    Visit Date: 9/26/2024    Physician Orders: PT Eval and Treat   Medical Diagnosis from Referral: M72.2 (ICD-10-CM) - Plantar fasciitis of left foot  Evaluation Date: 7/2/2024  Authorization Period Expiration: 12/31/2024  Plan of Care Expiration: 11/17/2024  Visit # / Visits authorized: 22/ 31  #Visits based on PHYSICAL THERAPY POC: 24      Foto  Date  Score       Foot   Lower Score = Greater Disability   #1/3 7/2/2024 51.0   #2/3 7/18/2024 51.0    #3/3  8/9/2024  49  FOTO not yet d/c      Time in   4:08pm   Time out   5:39pm   Total time   91 minutes      Precautions: Standard, patient is now 5 years out from cancer treatment   PTA Visit #: 1/5     Subjective   Pain: 0/10  Location: bilateral bottom of L heel     HOME EXERCISE PROGRAM  Reviewed, reports compliance    Response  Bottom of L heel was sore right after PHYSICAL THERAPY but did not experience symptoms at all for close to 2 days afterwards    Current functional status  Limitation in walking, negotiating stairs. Has been staying on her toes when using elliptical.     Previous functional status  No running, jumping   Patient stated goal Improve walking tolerance for walking (particularly for work in [employer]).       Objective    9/17/2024:  Observation/posture:   non weight bearing: R arch slightly higher than L arch   Weight bearing: R arch slightly higher than L arch      Gait/assistive device:   Quad cane for longer walks - ex: hiking      Range of Motion: AROM:  Ankle Right  Left    Dorsiflexion 10 13   Plantarflexion 42 45   Inversion 34 33   Eversion 13 19      Knee  Right  Left    Flexion  128 116   Extension  -3 -3      Strength:  Ankle Right  Left    Dorsiflexion 4+/5  "4+/5   Plantarflexion 4/5 4+/5   Inversion 4+/5 4+/5   Eversion 4/5 4+/5      Hip  Right  Left    Flexion  4/5 4/5   Internal rotation  4/5 4/5   External rotation  4/5 4/5      Knee  Right  Left    Flexion  4/5 4/5   Extension  4+/5 4-/5      Joint Mobility:     Left   TCJ Hypomobile    STJ Hypomobile       Flexibility:     Left   Gastroc Restricted    Soleus Restricted    Plantar fascia  Restricted, knotting along tissues       Function/balance:     Right Left   Single limb stance 27", 23", 28" 30", 6", 18"       Edema:     Right Left   Ankle figure of eight 52.5 cm 55.0 cm   Ankle joint line 27.0 cm  29.0 cm     Treatment     + indicates new exercise   Bold indicates completed exercise     therapeutic exercises to develop strength, endurance, ROM, flexibility, posture, and core stabilization for 20 minutes:  L soleus stretch, incline board, 30"x5   B gastroc stretch, incline board, 30"x5   L standing plantar fascia stretch, 30"x5   4 way ankle left, green band, 3 x 10  L toe flex, standing on green band, 3x10   B heel raises on 6inch step with R/L LE lowering, B HRA, 3x10   Stool pull, 2 laps      manual therapy techniques: Joint mobilizations, Manual traction, Myofacial release, Soft tissue Mobilization, and Friction Massage for 20 minutes:  L STM Plantar Fascia, L Achilles decompression cupping, STM using firm tool at L plantar fascia   L TCJ, STJ Gr III/IV JM  DRY NEEDLING: prep, drape, palpation, education. Application of TDN: Pt educated on benefits and potential side effects of dry needling. Educated pt to use heat following treatment sessions if pt is experiencing pain or soreness. Pt verbalized good understanding of education. Pt gave verbal and written consent for DN. Dry needling to the below listed structures using 15 mm needles:  Achilles (L), plantar fascia      neuromuscular re-education activities to improve: Balance, Coordination, Kinesthetic, Sense, Proprioception, and Posture for 16 " "minutes:  R/L SLS airex mat, 3 point cone tap, 30"x 3 each  B arch dome 3" hold, standing on foam 3x10  R/L tandem stance on foam, 30"x2   Toe yoga, 3x10  L ankle/foot circles on BAPS board, CW/CCW, eversion direction only, 3x10 each way   Supine L LE peroneal nerve glides, 3x10     therapeutic activities to improve functional performance for 35 minutes:  Dynamic heel lifts x 2 laps  Leg press #50, single limb lowering, 3x10  +Step down, 6 inch step for R LE in stance x20, 2inch step for L LE x 13   Lateral steps, green band at forefoot, x 3 laps      gait training to improve functional mobility and safety for 00 minutes:       Patient Education and Home Exercises       Education provided:   - Educated pt that he/she may feel soreness after session.      Home Exercises Provided:  Instructed patient to continue current home exercise program. Exercises were reviewed and Rosangela was able to demonstrate them prior to the end of the session.  Rosangela demonstrated good  understanding of the education provided. See Electronic Medical Record under Patient Instructions for exercises provided during therapy sessions    Assessment   Emphasis on quads, glutes, and intrinsic foot muscle closed chain strength progressions today. Patient is able to complete with difficulty due to fatigue and weakness. Cramping into peroneal nerve distribution of L foot during supine nerve glides; improves with less push in to inversion during nerve glides.       Pt presents with decreased ROM, muscle strength, flexibility, joint mobility. Increased pain, stiffness, soft tissue restriction. Impaired posture, joint mechanics, balance, gait pattern.      The patient's current task deficits include the following: limitation in ADL, self care, social/recreational tasks.     Rosangela is making fair progress towards meeting set goals.   Patient prognosis is Good.     Patient will continue to benefit from skilled outpatient physical therapy to address the " "deficits listed in the problem list box on initial evaluation, provide pt/family education and to maximize pt's level of independence in the home and community environment.      Anticipated barriers to physical therapy: chronicity of current injury and co morbidities     Goals:   Short Terms Goals: 3 weeks     Goal  Progress  Date    (1)  Patient will be I with HOME EXERCISE PROGRAM  Progressing, not met 9/26/2024      (2)  Patient will obtain 4/5 L hip external rotation MMT to indicate improved ability to complete transitional movements   ongoing, not met 9/26/2024      (3)   Patient will obtain 4/5 L knee flexion to indicate improved ability to walk, > 60 minutes  Ongoing, not met 9/26/2024           Long Term Goals: 6 weeks     Goal  Progress  Date    (1)  Patient will obtain 4+/5 L knee extension MMT to indicate improved ability to stand, > 60 minutes Ongoing, not met   9/26/2024   (2)   Patient will obtain L SLS average of 3 trials < 25" to indicate improved ability to step over obstacles in pathway  Progressing, not met 9/26/2024   (3)   Patient will obtain >115 deg L knee flexion ACTIVE RANGE OF MOTION to indicate improved ability to squat to lift objects, floor - waist level Ongoing, not met 9/26/2024           Plan   Plan of care Certification: 9/17/2024 to 11/17/2024.    Outpatient Physical Therapy 2 times weekly for 2-3 weeks to include the following interventions: Cervical/Lumbar Traction, Electrical Stimulation re-eval, dry needling, Gait Training, Iontophoresis (with ), Manual Therapy, Moist Heat/ Ice, Neuromuscular Re-ed, Patient Education, Self Care, Therapeutic Activities, and Therapeutic Exercise.      Physical therapist and physical therapy assistant(s) will met face to face to discuss patient's treatment plan and progress towards established goals. Pt will be seen by a physical therapist minimally every 6th visit or every 30 days.    Mirtha Sepulveda, PT "

## 2024-10-10 ENCOUNTER — CLINICAL SUPPORT (OUTPATIENT)
Dept: REHABILITATION | Facility: HOSPITAL | Age: 34
End: 2024-10-10
Payer: COMMERCIAL

## 2024-10-10 DIAGNOSIS — M72.2 PLANTAR FASCIITIS OF LEFT FOOT: Primary | ICD-10-CM

## 2024-10-10 DIAGNOSIS — R26.2 DIFFICULTY WALKING: ICD-10-CM

## 2024-10-10 PROCEDURE — 97112 NEUROMUSCULAR REEDUCATION: CPT | Mod: PN | Performed by: PHYSICAL THERAPIST

## 2024-10-10 PROCEDURE — 97110 THERAPEUTIC EXERCISES: CPT | Mod: PN | Performed by: PHYSICAL THERAPIST

## 2024-10-10 PROCEDURE — 97530 THERAPEUTIC ACTIVITIES: CPT | Mod: PN | Performed by: PHYSICAL THERAPIST

## 2024-10-10 PROCEDURE — 97140 MANUAL THERAPY 1/> REGIONS: CPT | Mod: PN | Performed by: PHYSICAL THERAPIST

## 2024-10-10 NOTE — PROGRESS NOTES
[facility]   Physical Therapy Treatment Note      Name: Rosangela Salazar  Clinic Number: [id number]    Therapy Diagnosis:   Encounter Diagnoses   Name Primary?    Plantar fasciitis of left foot Yes    Difficulty walking      Physician: Mattie Castro MD    Visit Date: 10/10/2024    Physician Orders: PT Eval and Treat   Medical Diagnosis from Referral: M72.2 (ICD-10-CM) - Plantar fasciitis of left foot  Evaluation Date: 7/2/2024  Authorization Period Expiration: 12/31/2024  Plan of Care Expiration: 11/17/2024  Visit # / Visits authorized: 23/ 31  #Visits based on PHYSICAL THERAPY POC: 24      Foto  Date  Score       Foot   Lower Score = Greater Disability   #1/3 7/2/2024 51.0   #2/3 7/18/2024 51.0    #3/3  8/9/2024  49  FOTO not yet d/c      Time in   4:05pm   Time out   5:11pm   Total time   66 minutes      Precautions: Standard, patient is now 5 years out from cancer treatment   PTA Visit #: 1/5     Subjective   Pain: 0/10  Location: bilateral bottom of L heel     HOME EXERCISE PROGRAM  Reviewed, reports compliance    Response  Less pain overall at the L heel/foot area, but it does still get irritated with prolonged walking tasks. Feels better when wearing certain shoe.   Current functional status  Limitation in walking, negotiating stairs. Has been staying on her toes when using elliptical.     Previous functional status  No running, jumping   Patient stated goal Improve walking tolerance for walking (particularly for work in [employer]).       Objective    9/17/2024:  Observation/posture:   non weight bearing: R arch slightly higher than L arch   Weight bearing: R arch slightly higher than L arch      Gait/assistive device:   Quad cane for longer walks - ex: hiking      Range of Motion: AROM:  Ankle Right  Left    Dorsiflexion 10 13   Plantarflexion 42 45   Inversion 34 33   Eversion 13 19      Knee  Right  Left    Flexion  128 116   Extension  -3 -3      Strength:  Ankle Right  Left   "  Dorsiflexion 4+/5 4+/5   Plantarflexion 4/5 4+/5   Inversion 4+/5 4+/5   Eversion 4/5 4+/5      Hip  Right  Left    Flexion  4/5 4/5   Internal rotation  4/5 4/5   External rotation  4/5 4/5      Knee  Right  Left    Flexion  4/5 4/5   Extension  4+/5 4-/5      Joint Mobility:     Left   TCJ Hypomobile    STJ Hypomobile       Flexibility:     Left   Gastroc Restricted    Soleus Restricted    Plantar fascia  Restricted, knotting along tissues       Function/balance:     Right Left   Single limb stance 27", 23", 28" 30", 6", 18"       Edema:     Right Left   Ankle figure of eight 52.5 cm 55.0 cm   Ankle joint line 27.0 cm  29.0 cm     Treatment     + indicates new exercise   Bold indicates completed exercise     therapeutic exercises to develop strength, endurance, ROM, flexibility, posture, and core stabilization for 15 minutes:  L soleus stretch, incline board, 30"x5   B gastroc stretch, incline board, 30"x5   L standing plantar fascia stretch, 30"x5   4 way ankle left, green band, 3 x 10  L toe flex, standing on green band, 3x10   B heel raises on 6inch step with R/L LE lowering, B HRA, 3x10   Stool pull, 2 laps      manual therapy techniques: Joint mobilizations, Manual traction, Myofacial release, Soft tissue Mobilization, and Friction Massage for 12 minutes:  L STM Plantar Fascia, L Achilles decompression cupping, STM using firm tool at L plantar fascia   L TCJ, STJ Gr III/IV JM  DRY NEEDLING: prep, drape, palpation, education. Application of TDN: Pt educated on benefits and potential side effects of dry needling. Educated pt to use heat following treatment sessions if pt is experiencing pain or soreness. Pt verbalized good understanding of education. Pt gave verbal and written consent for DN. Dry needling to the below listed structures using 15 mm needles:  Achilles (L), plantar fascia      neuromuscular re-education activities to improve: Balance, Coordination, Kinesthetic, Sense, Proprioception, and Posture " "for 14 minutes:  R/L SLS airex mat, 3 point cone tap, 30"x 3 each  +Tandem stand on foam, R/L LE leading, 30" x 3 each   B arch dome 3" hold, standing on foam 3x10  R/L tandem stance on foam, 30"x2   Toe yoga, 3x10  L ankle/foot circles on BAPS board, CW/CCW, eversion direction only, 3x10 each way   Supine L LE peroneal nerve glides, 3x10     therapeutic activities to improve functional performance for 25 minutes:  Dynamic heel lifts x 2 laps  Leg press #72.5, single limb lowering, 3x10  Step down, 6 inch step for R LE in stance x20, 2inch step for L LE x 13   Lateral steps, green band at forefoot, x 3 laps      gait training to improve functional mobility and safety for 00 minutes:       Patient Education and Home Exercises       Education provided:   - Educated pt that he/she may feel soreness after session.      Home Exercises Provided:  Instructed patient to continue current home exercise program. Exercises were reviewed and Rosangela was able to demonstrate them prior to the end of the session.  Rosangela demonstrated good  understanding of the education provided. See Electronic Medical Record under Patient Instructions for exercises provided during therapy sessions    Assessment   Emphasis on quads, glutes, and intrinsic foot muscle closed chain strength exercises. Patient is able to complete with difficulty due to fatigue and weakness, but tolerance is gradually progressing.     Pt presents with decreased ROM, muscle strength, flexibility, joint mobility. Increased pain, stiffness, soft tissue restriction. Impaired posture, joint mechanics, balance, gait pattern.      The patient's current task deficits include the following: limitation in ADL, self care, social/recreational tasks.     Rosangela is making fair progress towards meeting set goals.   Patient prognosis is Good.     Patient will continue to benefit from skilled outpatient physical therapy to address the deficits listed in the problem list box on initial " "evaluation, provide pt/family education and to maximize pt's level of independence in the home and community environment.      Anticipated barriers to physical therapy: chronicity of current injury and co morbidities     Goals:   Short Terms Goals: 3 weeks     Goal  Progress  Date    (1)  Patient will be I with HOME EXERCISE PROGRAM  Progressing, not met 10/10/2024      (2)  Patient will obtain 4/5 L hip external rotation MMT to indicate improved ability to complete transitional movements   ongoing, not met 10/10/2024      (3)   Patient will obtain 4/5 L knee flexion to indicate improved ability to walk, > 60 minutes  Ongoing, not met 10/10/2024           Long Term Goals: 6 weeks     Goal  Progress  Date    (1)  Patient will obtain 4+/5 L knee extension MMT to indicate improved ability to stand, > 60 minutes Ongoing, not met   10/10/2024   (2)   Patient will obtain L SLS average of 3 trials < 25" to indicate improved ability to step over obstacles in pathway  Progressing, not met 10/10/2024   (3)   Patient will obtain >115 deg L knee flexion ACTIVE RANGE OF MOTION to indicate improved ability to squat to lift objects, floor - waist level Ongoing, not met 10/10/2024           Plan   Plan of care Certification: 9/17/2024 to 11/17/2024.    Outpatient Physical Therapy 2 times weekly for 2-3 weeks to include the following interventions: Cervical/Lumbar Traction, Electrical Stimulation re-eval, dry needling, Gait Training, Iontophoresis (with ), Manual Therapy, Moist Heat/ Ice, Neuromuscular Re-ed, Patient Education, Self Care, Therapeutic Activities, and Therapeutic Exercise.      Physical therapist and physical therapy assistant(s) will met face to face to discuss patient's treatment plan and progress towards established goals. Pt will be seen by a physical therapist minimally every 6th visit or every 30 days.    Mirtha Sepulveda, PT                                             "

## 2024-10-15 ENCOUNTER — CLINICAL SUPPORT (OUTPATIENT)
Dept: REHABILITATION | Facility: HOSPITAL | Age: 34
End: 2024-10-15
Payer: COMMERCIAL

## 2024-10-15 DIAGNOSIS — R26.2 DIFFICULTY WALKING: ICD-10-CM

## 2024-10-15 DIAGNOSIS — M72.2 PLANTAR FASCIITIS OF LEFT FOOT: Primary | ICD-10-CM

## 2024-10-15 PROCEDURE — 97112 NEUROMUSCULAR REEDUCATION: CPT | Mod: PN | Performed by: PHYSICAL THERAPIST

## 2024-10-15 PROCEDURE — 97110 THERAPEUTIC EXERCISES: CPT | Mod: PN | Performed by: PHYSICAL THERAPIST

## 2024-10-15 PROCEDURE — 97140 MANUAL THERAPY 1/> REGIONS: CPT | Mod: PN | Performed by: PHYSICAL THERAPIST

## 2024-10-15 PROCEDURE — 97530 THERAPEUTIC ACTIVITIES: CPT | Mod: PN | Performed by: PHYSICAL THERAPIST

## 2024-10-15 NOTE — PROGRESS NOTES
[facility]   Physical Therapy Discharge Note      Name: Rosangela Salazar  Clinic Number: [id number]    Therapy Diagnosis:   Encounter Diagnoses   Name Primary?    Plantar fasciitis of left foot Yes    Difficulty walking      Physician: Mattie Castro MD    Visit Date: 10/15/2024    Physician Orders: PT Eval and Treat   Medical Diagnosis from Referral: M72.2 (ICD-10-CM) - Plantar fasciitis of left foot  Evaluation Date: 7/2/2024  Authorization Period Expiration: 12/31/2024  Plan of Care Expiration: 11/17/2024  Visit # / Visits authorized: 24/ 31  #Visits based on PHYSICAL THERAPY POC: 24      Foto  Date  Score       Foot   Lower Score = Greater Disability   #1/3 7/2/2024 51.0   #2/3 7/18/2024 51.0    #3/3  8/9/2024  49       Time in   4:08pm   Time out   5:25pm   Total time   77 minutes      Precautions: Standard, patient is now 5 years out from cancer treatment   PTA Visit #: 1/5     Subjective   Pain: 0/10  Location: bilateral bottom of L heel     HOME EXERCISE PROGRAM  Reviewed, reports compliance    Response  Less pain overall at the L heel/foot area, but it does still get irritated with prolonged walking tasks. Feels better when wearing certain type of shoe.   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       Objective    10/15/2024:  Observation/posture:   non weight bearing: R arch slightly higher than L arch   Weight bearing: R arch slightly higher than L arch      Gait/assistive device:   Quad cane for longer walks - ex: hiking      Range of Motion: AROM:  Ankle Right  Left    Dorsiflexion 10 13   Plantarflexion 42 45   Inversion 34 33   Eversion 13 19      Knee  Right  Left    Flexion  128 116   Extension  -3 -2      Strength:  Ankle Right  Left    Dorsiflexion 4+/5 4+/5  "  Plantarflexion 4/5 4+/5   Inversion 4+/5 4+/5   Eversion 4/5 4+/5      Hip  Right  Left    Flexion  4/5 4/5   Internal rotation  4/5 4/5   External rotation  4/5 4/5      Knee  Right  Left    Flexion  4/5 4/5   Extension  4+/5 4/5      Joint Mobility:     Left   TCJ Hypomobile    STJ Hypomobile       Flexibility:     Left   Gastroc Restricted    Soleus Restricted    Plantar fascia  Restricted, knotting along tissues       Function/balance:     Right Left   Single limb stance 27", 23", 28" 30", 6", 18"       Edema:     Right Left   Ankle figure of eight 52.5 cm 55.0 cm   Ankle joint line 27.0 cm  29.0 cm     Treatment     + indicates new exercise   Bold indicates completed exercise     therapeutic exercises to develop strength, endurance, ROM, flexibility, posture, and core stabilization for 29 minutes:  L soleus stretch, incline board, 30"x5   B gastroc stretch, incline board, 30"x5   L standing plantar fascia stretch, 30"x5   4 way ankle left, green band, 3 x 10  L toe flex, standing on green band, 3x10   B heel raises on 6inch step with R/L LE lowering, B HRA, 3x10   +Verbal review of HOME EXERCISE PROGRAM, updated PHYSICAL THERAPY POC     manual therapy techniques: Joint mobilizations, Manual traction, Myofacial release, Soft tissue Mobilization, and Friction Massage for 12 minutes:  L STM Plantar Fascia, L Achilles decompression cupping, STM using firm tool at L plantar fascia   L TCJ, STJ Gr III/IV JM  DRY NEEDLING: prep, drape, palpation, education. Application of TDN: Pt educated on benefits and potential side effects of dry needling. Educated pt to use heat following treatment sessions if pt is experiencing pain or soreness. Pt verbalized good understanding of education. Pt gave verbal and written consent for DN. Dry needling to the below listed structures using 15 mm needles:  Achilles (L), plantar fascia      neuromuscular re-education activities to improve: Balance, Coordination, Kinesthetic, Sense, " "Proprioception, and Posture for 12 minutes:  R/L SLS airex mat, 3 point cone tap, 30"x 3 each  +Tandem walk on foam, R/L LE leading, 30" x 3 each   B arch dome 3" hold, standing on foam 3x10  R/L tandem stance on foam, 30"x2   Toe yoga, 3x10  L ankle/foot circles on BAPS board, CW/CCW, eversion direction only, 3x10 each way   Supine L LE peroneal nerve glides, 3x10     therapeutic activities to improve functional performance for 24 minutes:  Dynamic heel lifts x 2 laps  Leg press #72.5, single L limb lowering, 3x10  Step down, 6 inch step for R LE in stance x20, 2inch step for L LE x 13   Lateral steps, green band at forefoot, x 3 laps      gait training to improve functional mobility and safety for 00 minutes:       Patient Education and Home Exercises       Education provided:   - Educated pt that he/she may feel soreness after session.      Home Exercises Provided:  Instructed patient to continue current home exercise program. Exercises were reviewed and Rosangela was able to demonstrate them prior to the end of the session.  Rosangela demonstrated good  understanding of the education provided. See Electronic Medical Record under Patient Instructions for exercises provided during therapy sessions    Assessment   Emphasis on quads, glutes, and intrinsic foot muscle closed chain strength exercises. Patient is able to complete with gradually improved tolerance. She presents with -2 deg L knee extension and 4/5 L knee extension MMT upon updated measures today. She has returned to using elliptical and experiences less impairment with walking tasks.     Rosangela has made good progress towards meeting set goals.   Patient prognosis is Good.      Anticipated barriers to physical therapy: chronicity of current injury and co morbidities     Goals:   Short Terms Goals: 3 weeks     Goal  Progress  Date    (1)  Patient will be I with HOME EXERCISE PROGRAM   met 10/15/2024      (2)  Patient will obtain 4/5 L hip external rotation MMT to " "indicate improved ability to complete transitional movements   met 10/15/2024      (3)   Patient will obtain 4/5 L knee flexion to indicate improved ability to walk, > 60 minutes   met 10/15/2024           Long Term Goals: 6 weeks     Goal  Progress  Date    (1)  Patient will obtain 4+/5 L knee extension MMT to indicate improved ability to stand, > 60 minutes Partially met   10/15/2024   (2)   Patient will obtain L SLS average of 3 trials < 25" to indicate improved ability to step over obstacles in pathway  Nearly  met 10/15/2024   (3)   Patient will obtain >115 deg L knee flexion ACTIVE RANGE OF MOTION to indicate improved ability to squat to lift objects, floor - waist level Partially met 10/15/2024           Plan   Plan of care Certification: 10/15/2024 to 11/17/2024.    Outpatient Physical Therapy included the following interventions: Cervical/Lumbar Traction, Electrical Stimulation re-eval, dry needling, Gait Training, Iontophoresis (with ), Manual Therapy, Moist Heat/ Ice, Neuromuscular Re-ed, Patient Education, Self Care, Therapeutic Activities, and Therapeutic Exercise.      Physical therapist and physical therapy assistant(s) will met face to face to discuss patient's treatment plan and progress towards established goals. Pt will be seen by a physical therapist minimally every 6th visit or every 30 days.    Patient has completed PHYSICAL THERAPY POC and is being discharged to HOME EXERCISE PROGRAM.     Mirtha Sepulveda, PT                                               "

## 2024-10-23 ENCOUNTER — PATIENT MESSAGE (OUTPATIENT)
Dept: OBSTETRICS AND GYNECOLOGY | Facility: CLINIC | Age: 34
End: 2024-10-23
Payer: COMMERCIAL

## 2024-11-06 ENCOUNTER — OFFICE VISIT (OUTPATIENT)
Dept: NEUROLOGY | Facility: CLINIC | Age: 34
End: 2024-11-06
Payer: COMMERCIAL

## 2024-11-06 VITALS
SYSTOLIC BLOOD PRESSURE: 134 MMHG | RESPIRATION RATE: 17 BRPM | DIASTOLIC BLOOD PRESSURE: 91 MMHG | WEIGHT: 249.13 LBS | TEMPERATURE: 97 F | HEART RATE: 104 BPM | HEIGHT: 67 IN | BODY MASS INDEX: 39.1 KG/M2

## 2024-11-06 DIAGNOSIS — G43.829 MENSTRUAL MIGRAINE WITHOUT STATUS MIGRAINOSUS, NOT INTRACTABLE: Primary | ICD-10-CM

## 2024-11-06 DIAGNOSIS — Z85.831 HISTORY OF SARCOMA: ICD-10-CM

## 2024-11-06 DIAGNOSIS — F41.9 ANXIETY: ICD-10-CM

## 2024-11-06 PROCEDURE — 3008F BODY MASS INDEX DOCD: CPT | Mod: CPTII,S$GLB,, | Performed by: PSYCHIATRY & NEUROLOGY

## 2024-11-06 PROCEDURE — 99214 OFFICE O/P EST MOD 30 MIN: CPT | Mod: S$GLB,,, | Performed by: PSYCHIATRY & NEUROLOGY

## 2024-11-06 PROCEDURE — 1159F MED LIST DOCD IN RCRD: CPT | Mod: CPTII,S$GLB,, | Performed by: PSYCHIATRY & NEUROLOGY

## 2024-11-06 PROCEDURE — 99999 PR PBB SHADOW E&M-EST. PATIENT-LVL IV: CPT | Mod: PBBFAC,,, | Performed by: PSYCHIATRY & NEUROLOGY

## 2024-11-06 PROCEDURE — 3080F DIAST BP >= 90 MM HG: CPT | Mod: CPTII,S$GLB,, | Performed by: PSYCHIATRY & NEUROLOGY

## 2024-11-06 PROCEDURE — 3075F SYST BP GE 130 - 139MM HG: CPT | Mod: CPTII,S$GLB,, | Performed by: PSYCHIATRY & NEUROLOGY

## 2024-11-06 NOTE — PROGRESS NOTES
"Subjective:       Patient ID: Rosangela Salazar is a 34 y.o. female.    Chief Complaint: Headache      INTERVAL HISTORY 11/05/2024  The patient presents for follow up. She reports the first episode of a visual aura starting with a central scotomata enlarging gradually, associated with peripheral fortification spectara, lasting about 20 minues. She is unsure if she had a headache following the visual disturbance. Of interest, it was precipitated by starting her computer which had a very bright light. She states that most of her migraine attacks are clustered around her menses. She has not tried the Sumatriptan or Fioricet yet. She was unsure of  how to take them. She is on Magnesium glycinate 400 mg daily as recommended by OB/Gyn. In the interim, a brain MRi W WO was normal.    HPI  The patient is a 35 y/o female presenting for evaluation of headache. 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 Also PMHx of Anxiety, GERD, Plantar Fasciitis. She reports onset of headache coinciding with receiving treatment for the sarcoma.She is actually referred by her OB/GYN. During the time of treatment for her cancer, she was on continuous OCP which were stopped upon completion. She reports a very severe headache when stopping continuous contraception until she adjusted and her body "reset." She notes that cannot take NSAIDs due to effects of cancer treatment. A couple of weeks ago, she had a formal eye exam with no abnormalities found.   HIT-6: 58  Please see details of headache characteristics below.  Headache questionnaire    1. When did your Headaches start?    6 YRS AGO      2. Where are your headaches located?   BACK LOWER/ BEHIND EAR      3. Your headache's characteristics:   Excruciating, Pressure, Throbbing,     4. How long does the headache last?   1-2 DAYS      5. How often does the headache occur?   monthly, SINUS HEADACHES/ALLERGIES      6. Are " your headaches preceded or accompanied by other symptoms? N/A   If yes, please describe.  N/A      7. Does the headache awaken you at night? no   If so, how often? N/A        8. Please allen the word that best describes your headache's intensity:    severe      9. Using a scale of 1 through 10, with 0 = no pain and 10 = the worst pain:   What score is your headache now? 0   What score is your headache at its worst? 7   What score is your headache at its best? 0        10. Possible associated headache symptoms:  [x]  Sensitivity to light  [] Dizziness  [] Nasal or sinus pressure/ pain   [x] Sensitivity to noise  [] Vertigo  [] Problems with concentration  [x] Sensitivity to smells  [] Ringing in ears  [] Problems with memory    [] Blurred vision  [] Irritability  [] Problems with task completion   [] Double vision  [] Anger  []  Problems with relaxation  [] Loss of appetite  [] Anxiety  [] Neck tightness, Neck pain  [] Nausea   [] Nasal congestion  [] Vomiting         11. Headache improving factors:  [] Sleep  [] Heat  [] Darkness  [x] Ice  [x] Local pressure [x] Menses (period)  [x] Massage   [x] Medications:        12. Headache worsening factors:   [] Fatigue [] Sneezing  [] Changes in Weather  [x] Light [] Bending Over [] Stress  [x] Noise [] Ovulation  [] Multiple Sclerosis Flare-Up  [] Smells  [x] Menses  [] Food   [] Coughing [] Alcohol      13. Number of caffeinated drinks per day: 2      14. Number of diet drinks per day:  0    15. Bowel Habits: NORMAL   Please Check any Medications or Procedures tried/failed for Headache    AED Neuromodulators  MAOIs  Ergot Alkaloids    Acetazolamide (Diamox) [] Phenelzine (Nardil) [] Dihydroergotamine (Migranal) []   Carbamazepine (Tegretol) [] Tranylcypromine (Parnate) [] Ergotamine (Ergomar) []   Gabapentin (Neurontin) [] Antihistamine/Serotonergic  Triptans    Lacosamide (Vimpat) [] Cyproheptadine (Periactin) [] Almotriptan (Axert) []   Lamotrigine (Lamictal) []  Antihypertensives  Eletriptan (Relpax) []   Levatiracetam (Keppra) [] Atenolol (Tenormin) [] Frovatriptan (Frova) []   Oxcarbazepine (Trileptal) [] Bisoprostol (Zebeta) [] Naratriptan (Amerge) []   Phenobarbital [] Candesartan (Atacand) [] Rizatriptan (Maxalt) []     Nebivolol (Bystolic)  Sumatriptan (Imitrex) []   Levetiracetam (Keppra)  Cardeilol (Coreg) [] Zolmitriptan (Zomig) []   Phenytoin (Dilantin) [] Diltiazem (Cardizem) []     Pregabalin (Lyrica) [] Lisinopril (Prinivil, Zestril) [] Combo Abortives    Primidone (Mysoline) [] Metoprolol (Toprol) [] BC Powder []   Tiagabine (Gabatril) [] Nadolol (Corgard) [] Butalbital and Acetaminophen (Bupap) []   Topiramate (Topamax)  (Trokendi) [] Nicardipine (Cardene) []     Vigabatrin (Sabril) [] Nimodipine (Nimotop) [] Butalbital, Acetaminophen, and caffeine (Fioricet) []   Valproic Acid (Depakote) (Divalproex Sodium) [] Propranolol (Inderal) []     Zonisamide (Zonegran) [] Telmisartan (Micardis) [] Butalbital, Aspirin, and caffeine (Fiorinal) []   Benzodiazepines  Timolol (Blocadren) []     Alprazolam (Xanax) [] Verapamil (Calan, Verelan) [] Butalbital, Caffeine, Acetaminophen, and Codeine (Fioricet with Codeine) []   Diazepam (Valium) [] NSAIDs      Lorazepam (Ativan) [] Acetaminophen (Tylenol) [x]     Clonazepam (Klonopin) [] Acetylsalicylic Acid (Aspirin) [] Butalbital, Caffeine, Aspirin, and Codeine  (Fiorinal with Codeine) []   Antidepressants  Diclofenac (Cambia) []     Amitriptyline (Elavil) [] Ibuprofen (Motrin) []     Desipramine (Norpramin) [] Indomethacin (Indocin) [] Aspirin, Caffeine, and Acetaminophen (Excedrin) (Goodys) []   Doxepin (Sinequan) [] Ketoprofen (Orudis) []     Fluoxetine (Prozac) [] Ketorolac (Toradol) [] Acetaminophen, Dichloralphenazone, and Isometheptene (Midrin) []   Imipramine (Tofranil) [] Naproxen (Anaprox) (Aleve) []     Nortriptyline (Pamelor) [] Meclofenamic Acid (Meclomen) []     Venlafaxine (Effexor) [] Meloxicam (Mobic) []  Procedures    Desvenlafazine (Pristiq) [] Monoclonals  Greater occipital nerve block []   Duloxetine (Cymbalta) [] Eptinezumab [] Cervical, Thoracic, Lumbar radiofrequency ablation []   Trazadone [] Erenumab-aooe (Aimovig) [] Spenopalatine ganglion block []   Wellbutrin [] Galcanezumab (Emgality) [] Occipital neuro stimulation []   Protriptyline (Vivactil) [] Fremanazumab-vfrm (Ajovy)  Cervical, Thoracic, Lumbar, Caudal Epidural steroid injection []   Escitalopram (Lexapro) [] Other [] Sacroiliac joint steroid injection []   Celexa [] Memantine (Namenda) [] Transforaminal epidural steroid injection []     Botox [] Facet joint injections []     Baclofen (Lioresal) [] Cervical, Thoracic, Lumbar medial branch blocks []       Cefaly []       Gamma Core []       Iovera []       Transcranial Magnetic stimulation []                           Review of Systems   Constitutional:  Negative for activity change, appetite change, fatigue and fever.   HENT:  Negative for congestion, dental problem, hearing loss, sinus pressure, tinnitus, trouble swallowing and voice change.    Eyes:  Positive for visual disturbance. Negative for photophobia, pain and redness.   Respiratory:  Negative for cough, chest tightness and shortness of breath.    Cardiovascular:  Positive for palpitations. Negative for chest pain and leg swelling.   Gastrointestinal:  Negative for abdominal pain, blood in stool, nausea and vomiting.   Endocrine: Negative for cold intolerance and heat intolerance.   Genitourinary:  Negative for difficulty urinating, frequency, menstrual problem and urgency.   Musculoskeletal:  Negative for arthralgias, back pain, gait problem, joint swelling, myalgias, neck pain and neck stiffness.   Skin: Negative.    Neurological:  Negative for dizziness, tremors, seizures, syncope, facial asymmetry, speech difficulty, weakness, light-headedness and numbness.   Hematological:  Negative for adenopathy. Does not bruise/bleed easily.    Psychiatric/Behavioral:  Negative for agitation, behavioral problems, confusion, decreased concentration, self-injury, sleep disturbance and suicidal ideas. The patient is not nervous/anxious and is not hyperactive.                Past Medical History:   Diagnosis Date    Allergy     seasonal    Bone cancer     Gall bladder inflammation     Mass of knee 3/29/2018    Soft tissue sarcoma     Tachycardia      Past Surgical History:   Procedure Laterality Date    BONE GRAFT Left 11/29/2018    femur     COLONOSCOPY  01/2013    COLONOSCOPY  08/2021    KNEE SURGERY      left femur open biopsy      tumor removal left leg      WISDOM TOOTH EXTRACTION       Family History   Problem Relation Name Age of Onset    Colon cancer Paternal Grandmother Peace     Cancer Paternal Grandmother Epace     No Known Problems Father      Diabetes Mother Lo     Ovarian cancer Maternal Aunt Vannessa     Diabetes Maternal Aunt Vannessa     Ovarian cancer Maternal Aunt Deann     Cancer Maternal Aunt Deann     Diabetes Maternal Aunt Deann     Diabetes Maternal Grandmother Yokasta     Diabetes Maternal Grandfather      Diabetes Maternal Uncle Joseluis     Diabetes Cousin      Cancer Maternal Aunt Seb      Social History     Socioeconomic History    Marital status:      Spouse name: Joseluis   Occupational History    Occupation:    Tobacco Use    Smoking status: Never    Smokeless tobacco: Never   Substance and Sexual Activity    Alcohol use: Yes     Comment: 1-2 a month    Drug use: Never    Sexual activity: Yes     Partners: Male     Birth control/protection: Condom, OCP     Social Determinants of Health     Financial Resource Strain: Low Risk  (6/24/2024)    Overall Financial Resource Strain (CARDIA)     Difficulty of Paying Living Expenses: Not very hard   Food Insecurity: No Food Insecurity (6/24/2024)    Hunger Vital Sign     Worried About Running Out of Food in the Last Year: Never true     Ran Out of Food in the Last Year: Never  true   Transportation Needs: No Transportation Needs (8/3/2023)    PRAPARE - Transportation     Lack of Transportation (Medical): No     Lack of Transportation (Non-Medical): No   Physical Activity: Sufficiently Active (6/24/2024)    Exercise Vital Sign     Days of Exercise per Week: 5 days     Minutes of Exercise per Session: 30 min   Stress: Stress Concern Present (6/24/2024)    [place] [employer] - Occupational Stress Questionnaire     Feeling of Stress : Very much   Housing Stability: Low Risk  (8/3/2023)    Housing Stability Vital Sign     Unable to Pay for Housing in the Last Year: No     Number of Places Lived in the Last Year: 1     Unstable Housing in the Last Year: No     Review of patient's allergies indicates:   Allergen Reactions    Ethanol (ethyl alcohol)     Zithromax [azithromycin] Nausea Only     Current Outpatient Medications   Medication Sig    ALPRAZolam (XANAX) 0.25 MG tablet Take 1 tablet (0.25 mg total) by mouth daily as needed for Anxiety.    azelastine (ASTELIN) 137 mcg (0.1 %) nasal spray SPRAY 1 SPRAY BY NASAL ROUTE 2 TIMES DAILY.    busPIRone (BUSPAR) 10 MG tablet Take 1 tablet (10 mg total) by mouth 2 (two) times daily.    cetirizine (ZYRTEC) 10 MG tablet Take 10 mg by mouth once daily.    dicyclomine (BENTYL) 20 mg tablet Take 1 tablet (20 mg total) by mouth 3 (three) times daily as needed (abdominal pain/ bloating).    LORazepam (ATIVAN) 0.5 MG tablet Take by mouth.    LORazepam (ATIVAN) 1 MG tablet Take one tab 30 minutes prior to MRI    metoprolol tartrate (LOPRESSOR) 25 MG tablet Take 25 mg by mouth 2 (two) times daily.    montelukast (SINGULAIR) 10 mg tablet TAKE 1 TABLET BY MOUTH EVERY DAY AT NIGHT    multivitamin (THERAGRAN) per tablet Take 2 tablets by mouth.    omeprazole (PRILOSEC) 40 MG capsule Take 40 mg by mouth once daily.    psyllium (METAMUCIL) powder Take 1 packet by mouth.    sumatriptan (IMITREX) 50 MG tablet Take 1 at onset of headache, may  repeat in 2 hours to a max of 3 per day, 2 days per week    calcium citrate-vitamin D3 200 mg calcium -250 unit Tab Take 2 tablets by mouth Daily.  (Patient not taking: Reported on 11/6/2024)    LOW-OGESTREL, 28, 0.3-30 mg-mcg per tablet TAKE 1 TABLET BY MOUTH ONCE DAILY. SKIP PLACEBO WEEK. (Patient not taking: Reported on 11/6/2024)     No current facility-administered medications for this visit.         Objective:      Vitals:    11/06/24 0903   BP: (!) 134/91   Pulse: 104   Resp: 17   Temp: 97.1 °F (36.2 °C)     Body mass index is 39.02 kg/m².      Physical Exam    Constitutional:   She appears well-developed and well-nourished. She is well groomed    Neurological Exam:  General: well-developed, well-nourished, no distress  Mental status: Awake and alert  Speech language: No dysarthria or aphasia on conversation  Cranial nerves: Face symmetric  Motor: Moves all extremities well  Coordination: No ataxia. No tremor.       Review of Data:  Lab Results   Component Value Date     08/28/2023    K 4.3 08/28/2023     08/28/2023    CO2 21 (L) 08/28/2023    BUN 12 09/18/2023    BUN 10 08/28/2023    CREATININE 0.71 09/18/2023    CREATININE 0.66 08/28/2023    GLU 87 08/28/2023    HGBA1C 5.5 09/14/2023    AST 42 (H) 08/28/2023    ALT 27 08/28/2023    ALBUMIN 4.3 08/28/2023    PROT 8.0 08/28/2023    BILITOT 0.6 08/28/2023    CHOL 189 10/04/2024    HDL 51 10/04/2024    LDLCALC 119 (H) 10/04/2024    TRIG 94 10/04/2024       Lab Results   Component Value Date    WBC 6.7 09/18/2023    HGB 12.7 09/18/2023    HCT 39.8 09/18/2023    MCV 90 09/18/2023     09/18/2023       Lab Results   Component Value Date    TSH 1.210 04/01/2024           Results for orders placed or performed during the hospital encounter of 08/08/24   MRI Brain W WO Contrast    Narrative    EXAMINATION:  MRI BRAIN W WO CONTRAST    CLINICAL HISTORY:  visual disturbance;.  Unspecified visual disturbance    TECHNIQUE:  Multiplanar multisequence MR  imaging of the brain was performed before and after the uneventful intravenous administration of 10 mL Gadavist.  Diffusion weighted imaging was performed.  ADC map was generated.    COMPARISON:  None.    FINDINGS:  Intracranial compartment:    There is no acute intracranial abnormality.  Brain volume, ventricular size and position are normal.  There is no hemorrhage or mass/mass effect.  There are no definite regions of abnormal signal intensity in the brain.  There are no regions of restricted diffusion to suggest acute infarction.  There is no pathologic enhancement.  The basilar cisterns are open.  There is no abnormal extra-axial fluid collection.  Flow voids indicating patency are present in the major vessels at the base of the brain.  The dural sinuses enhance normally.  The cerebellar tonsils are in normal position.  The sellar structures are normal.  The orbits are grossly normal.    Skull/extracranial contents: Marrow signal intensity in the clivus and calvarium is grossly normal.  There is a tiny mucous retention cyst in the floor of the right maxillary sinus.  Otherwise, the included paranasal sinuses and mastoid air cells are clear.  The included facial soft tissues and scalp are normal.      Impression    1.  Normal imaging of the brain.  There is no acute abnormality.  There is no hemorrhage, mass/mass effect, acute infarction.  There is no pathologic enhancement.      Electronically signed by: Brayan Guerrier MD  Date:    08/08/2024  Time:    11:28             Assessment and Plan   The patient is a 35 y/o female who meets criteria for migraine, low frequency episodic, predominantly lisa-menstrual. She reports Visual disturbance today highly stereotypical of visual aura with a perfect description of fortification spectra. Recent eye exam negative. Weight stable. No papilledema on exam today. Onset of migraine coinciding with treatment for sarcoma of the LLE, likely multifactorial. Hormonal  fluctuations from stopping OCD, lifestyles changes, stress related to condition etc. She has never had imaging before. Will obtain an MRI of the brain W WO to rule out conditions such demyelinating disease  -     MRI Brain W WO Contrast, normal    Menstrual migraine without status migrainosus, not intractable, Will treat the acute attacks and will hold prevention for now. Preemptive treatment is a consideration  -     sumatriptan (IMITREX) 50 MG tablet; Take 1 at onset of headache, may repeat in 2 hours to a max of 3 per day, 2 days per week  Dispense: 9 tablet; Refill: 3        -      Fioricet #10/month for rescue  I have discussed the side effects of the medications prescribed and the patient acknowledges understanding   RTC in 4 [facility]      Alesha Schwartz M.D   of Neurology  ACGME Board Certified, Neurology  [facility], Board certified, headache Medicine  Medical Director, Headache and Facial Pain  [facility]

## 2024-11-06 NOTE — PATIENT INSTRUCTIONS
Take Sumatriptan at the onset of attack, may repeat once after 2 hours. Maximum 2 tabs per day.  Take Fioricet every 4 to 6 hours as needed. Maximum 4 per day  Continue Magnesium Glycinate 400 mg daily

## 2024-11-07 DIAGNOSIS — N92.0 MENORRHAGIA WITH REGULAR CYCLE: ICD-10-CM

## 2024-11-07 RX ORDER — NORGESTREL AND ETHINYL ESTRADIOL 0.3-0.03MG
KIT ORAL
Qty: 84 TABLET | Refills: 1 | Status: SHIPPED | OUTPATIENT
Start: 2024-11-07

## 2024-11-07 NOTE — TELEPHONE ENCOUNTER
Refill Decision Note   Rosangela Salazar  is requesting a refill authorization.  Brief Assessment and Rationale for Refill:  Approve     Medication Therapy Plan:         Comments:     Note composed:6:22 AM 11/07/2024

## 2024-11-20 ENCOUNTER — PATIENT MESSAGE (OUTPATIENT)
Dept: OBSTETRICS AND GYNECOLOGY | Facility: CLINIC | Age: 34
End: 2024-11-20
Payer: COMMERCIAL

## 2024-12-02 ENCOUNTER — OFFICE VISIT (OUTPATIENT)
Dept: OBSTETRICS AND GYNECOLOGY | Facility: CLINIC | Age: 34
End: 2024-12-02
Payer: COMMERCIAL

## 2024-12-02 VITALS
SYSTOLIC BLOOD PRESSURE: 117 MMHG | DIASTOLIC BLOOD PRESSURE: 83 MMHG | BODY MASS INDEX: 39.85 KG/M2 | WEIGHT: 254.44 LBS

## 2024-12-02 DIAGNOSIS — Z01.419 ENCOUNTER FOR GYNECOLOGICAL EXAMINATION (GENERAL) (ROUTINE) WITHOUT ABNORMAL FINDINGS: Primary | ICD-10-CM

## 2024-12-02 PROCEDURE — 1160F RVW MEDS BY RX/DR IN RCRD: CPT | Mod: CPTII,S$GLB,, | Performed by: OBSTETRICS & GYNECOLOGY

## 2024-12-02 PROCEDURE — 99395 PREV VISIT EST AGE 18-39: CPT | Mod: S$GLB,,, | Performed by: OBSTETRICS & GYNECOLOGY

## 2024-12-02 PROCEDURE — 99999 PR PBB SHADOW E&M-EST. PATIENT-LVL III: CPT | Mod: PBBFAC,,, | Performed by: OBSTETRICS & GYNECOLOGY

## 2024-12-02 PROCEDURE — 1159F MED LIST DOCD IN RCRD: CPT | Mod: CPTII,S$GLB,, | Performed by: OBSTETRICS & GYNECOLOGY

## 2024-12-02 PROCEDURE — 3079F DIAST BP 80-89 MM HG: CPT | Mod: CPTII,S$GLB,, | Performed by: OBSTETRICS & GYNECOLOGY

## 2024-12-02 PROCEDURE — 3074F SYST BP LT 130 MM HG: CPT | Mod: CPTII,S$GLB,, | Performed by: OBSTETRICS & GYNECOLOGY

## 2024-12-02 PROCEDURE — 3008F BODY MASS INDEX DOCD: CPT | Mod: CPTII,S$GLB,, | Performed by: OBSTETRICS & GYNECOLOGY

## 2024-12-02 RX ORDER — BUTALBITAL, ACETAMINOPHEN AND CAFFEINE 50; 325; 40 MG/1; MG/1; MG/1
1 TABLET ORAL
COMMUNITY
Start: 2024-04-08

## 2024-12-02 RX ORDER — FOLIC ACID 0.4 MG
400 TABLET ORAL DAILY
COMMUNITY

## 2024-12-02 RX ORDER — FLUTICASONE PROPIONATE 50 MCG
100 SPRAY, SUSPENSION (ML) NASAL
COMMUNITY
Start: 2024-07-08

## 2024-12-02 RX ORDER — MAGNESIUM GLYCINATE 100 MG
400 CAPSULE ORAL
COMMUNITY
Start: 2024-03-27

## 2024-12-02 NOTE — PROGRESS NOTES
Subjective:       Patient ID: Rosangela Salazar is a 34 y.o. female.    Chief Complaint:  Well Woman (Last pap and hpv: 2023)        History of Present Illness  Rsoangela Salazar is a 34 y.o. female  who presents for annual. Overall doing well. Has had monthly periods since stopping OCP. Desires pregnancy. Discussed in detail, ovulation timing. Taking PNV. All questions answered. Recommend Danny danay. How to get pregnant hand out given.     Patient's last menstrual period was 2024.   Date of Last Pap: 2023    Review of Systems  Review of Systems   Constitutional:  Negative for chills and fever.        Objective:   Physical Exam:   Constitutional: She is oriented to person, place, and time. Vital signs are normal. She appears well-developed and well-nourished. No distress.        Pulmonary/Chest: She exhibits no mass. Right breast exhibits no mass, no nipple discharge, no skin change, no tenderness, no bleeding and no swelling. Left breast exhibits no mass, no nipple discharge, no skin change, no tenderness, no bleeding and no swelling. Breasts are symmetrical.        Abdominal: Soft. Bowel sounds are normal. She exhibits no distension and no mass. There is no abdominal tenderness. There is no rebound.     Genitourinary:    Vagina and uterus normal.   There is no rash, tenderness, lesion or injury on the right labia. There is no rash, tenderness, lesion or injury on the left labia. Cervix is normal. Right adnexum displays no mass, no tenderness and no fullness. Left adnexum displays no mass, no tenderness and no fullness. No erythema, vaginal discharge, tenderness, rectocele, cystocele or prolapse of vaginal walls in the vagina. Cervix exhibits no motion tenderness, no discharge and no friability. Uterus is not deviated, not enlarged, not fixed, not tender and not hosting fibroids.           Musculoskeletal: Normal range of motion and moves all extremeties.      Lymphadenopathy:        Right: No  supraclavicular adenopathy present.        Left: No supraclavicular adenopathy present.    Neurological: She is alert and oriented to person, place, and time.    Skin: Skin is warm and dry.    Psychiatric: She has a normal mood and affect. Her behavior is normal. Judgment normal.        Assessment/ Plan:     1. Encounter for gynecological examination (general) (routine) without abnormal findings            Follow up in about 1 year (around 12/2/2025) for Annual exam.    As of April 1, 2021, the Cures Act has been passed nationally. This new law requires that all doctors progress notes, lab results, pathology reports and radiology reports be released IMMEDIATELY to the patient in the patient portal. That means that the results are released to you at the EXACT same time they are released to me. Therefore, with all of the patients that I have I am not able to reply to each patient exactly when the results come in. So there will be a delay from when you see the results to when I see them and have time to come up with a response to send you. Also I only see these results when I am on the computer at work. So if the results come in over the weekend or after 5 pm of a work day, I will not see them until the next business day. As you can tell, this is a challenge as a physician to give every patient the quick response they hope for and deserve. So please be patient! Thanks for understanding, Dr. Pereyra

## 2025-01-29 ENCOUNTER — PATIENT MESSAGE (OUTPATIENT)
Dept: OBSTETRICS AND GYNECOLOGY | Facility: CLINIC | Age: 35
End: 2025-01-29
Payer: COMMERCIAL

## 2025-01-31 ENCOUNTER — OFFICE VISIT (OUTPATIENT)
Dept: OBSTETRICS AND GYNECOLOGY | Facility: CLINIC | Age: 35
End: 2025-01-31
Payer: COMMERCIAL

## 2025-01-31 DIAGNOSIS — L65.9 HAIR THINNING: Primary | ICD-10-CM

## 2025-01-31 PROCEDURE — 1159F MED LIST DOCD IN RCRD: CPT | Mod: CPTII,95,, | Performed by: OBSTETRICS & GYNECOLOGY

## 2025-01-31 PROCEDURE — 1160F RVW MEDS BY RX/DR IN RCRD: CPT | Mod: CPTII,95,, | Performed by: OBSTETRICS & GYNECOLOGY

## 2025-01-31 PROCEDURE — 98005 SYNCH AUDIO-VIDEO EST LOW 20: CPT | Mod: 95,,, | Performed by: OBSTETRICS & GYNECOLOGY

## 2025-01-31 NOTE — PROGRESS NOTES
The patient location is: [place]  The chief complaint leading to consultation is: hair thinning    Visit type: audiovisual    Face to Face time with patient: 10 min  15 minutes of total time spent on the encounter, which includes face to face time and non-face to face time preparing to see the patient (eg, review of tests), Obtaining and/or reviewing separately obtained history, Documenting clinical information in the electronic or other health record, Independently interpreting results (not separately reported) and communicating results to the patient/family/caregiver, or Care coordination (not separately reported).         Each patient to whom he or she provides medical services by telemedicine is:  (1) informed of the relationship between the physician and patient and the respective role of any other health care provider with respect to management of the patient; and (2) notified that he or she may decline to receive medical services by telemedicine and may withdraw from such care at any time.    Notes: patient here to discuss some hair thinning. Her periods are normal and monthly. She is going shortly to [facility] for the first time in a year and is very nervous. So stress has been high. I discussed not likely hormonal at her age and with normal periods. 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 Does have some hair thinning in the family. Also has to shave her face because of hair. I explained that makes it come in thicker and coarser. Discussed. Will monitor for now. She will update me after her [facility] appointment.

## 2025-02-20 ENCOUNTER — PATIENT MESSAGE (OUTPATIENT)
Dept: OTOLARYNGOLOGY | Facility: CLINIC | Age: 35
End: 2025-02-20
Payer: COMMERCIAL

## 2025-04-23 ENCOUNTER — OFFICE VISIT (OUTPATIENT)
Dept: NEUROLOGY | Facility: CLINIC | Age: 35
End: 2025-04-23
Payer: COMMERCIAL

## 2025-04-23 VITALS
HEART RATE: 91 BPM | BODY MASS INDEX: 38.89 KG/M2 | HEIGHT: 67 IN | DIASTOLIC BLOOD PRESSURE: 92 MMHG | WEIGHT: 247.81 LBS | SYSTOLIC BLOOD PRESSURE: 136 MMHG | RESPIRATION RATE: 17 BRPM | TEMPERATURE: 97 F

## 2025-04-23 DIAGNOSIS — F41.9 ANXIETY: ICD-10-CM

## 2025-04-23 DIAGNOSIS — Z85.831 HISTORY OF SARCOMA: ICD-10-CM

## 2025-04-23 DIAGNOSIS — G43.829 MENSTRUAL MIGRAINE WITHOUT STATUS MIGRAINOSUS, NOT INTRACTABLE: Primary | ICD-10-CM

## 2025-04-23 PROCEDURE — 3080F DIAST BP >= 90 MM HG: CPT | Mod: CPTII,S$GLB,, | Performed by: PSYCHIATRY & NEUROLOGY

## 2025-04-23 PROCEDURE — 3075F SYST BP GE 130 - 139MM HG: CPT | Mod: CPTII,S$GLB,, | Performed by: PSYCHIATRY & NEUROLOGY

## 2025-04-23 PROCEDURE — 99214 OFFICE O/P EST MOD 30 MIN: CPT | Mod: S$GLB,,, | Performed by: PSYCHIATRY & NEUROLOGY

## 2025-04-23 PROCEDURE — 1159F MED LIST DOCD IN RCRD: CPT | Mod: CPTII,S$GLB,, | Performed by: PSYCHIATRY & NEUROLOGY

## 2025-04-23 PROCEDURE — 3008F BODY MASS INDEX DOCD: CPT | Mod: CPTII,S$GLB,, | Performed by: PSYCHIATRY & NEUROLOGY

## 2025-04-23 PROCEDURE — 99999 PR PBB SHADOW E&M-EST. PATIENT-LVL IV: CPT | Mod: PBBFAC,,, | Performed by: PSYCHIATRY & NEUROLOGY

## 2025-04-24 NOTE — PROGRESS NOTES
"Subjective:       Patient ID: Rosangela Salazar is a 35 y.o. female.    Chief Complaint: Headache    INTERVAL HISTORY 04/23/2025  The patient presents for follow up. She is doing well. She had a severe attack that did not respond to Tylenol, she took Fioricet and worked very well. Not needed to use it again. She reports she is planning to conceive and would like to discuss options for treatment during that time.     INTERVAL HISTORY 11/05/2024  The patient presents for follow up. She reports the first episode of a visual aura starting with a central scotomata enlarging gradually, associated with peripheral fortification spectara, lasting about 20 minues. She is unsure if she had a headache following the visual disturbance. Of interest, it was precipitated by starting her computer which had a very bright light. She states that most of her migraine attacks are clustered around her menses. She has not tried the Sumatriptan or Fioricet yet. She was unsure of  how to take them. She is on Magnesium glycinate 400 mg daily as recommended by OB/Gyn. In the interim, a brain MRi W WO was normal.    HPI  The patient is a 33 y/o female presenting for evaluation of headache. 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 Also PMHx of Anxiety, GERD, Plantar Fasciitis. She reports onset of headache coinciding with receiving treatment for the sarcoma.She is actually referred by her OB/GYN. During the time of treatment for her cancer, she was on continuous OCP which were stopped upon completion. She reports a very severe headache when stopping continuous contraception until she adjusted and her body "reset." She notes that cannot take NSAIDs due to effects of cancer treatment. A couple of weeks ago, she had a formal eye exam with no abnormalities found.   HIT-6: 58  Please see details of headache characteristics below.  Headache questionnaire    1. When did your " Headaches start?    6 YRS AGO      2. Where are your headaches located?   BACK LOWER/ BEHIND EAR      3. Your headache's characteristics:   Excruciating, Pressure, Throbbing,     4. How long does the headache last?   1-2 DAYS      5. How often does the headache occur?   monthly, SINUS HEADACHES/ALLERGIES      6. Are your headaches preceded or accompanied by other symptoms? N/A   If yes, please describe.  N/A      7. Does the headache awaken you at night? no   If so, how often? N/A        8. Please allen the word that best describes your headache's intensity:    severe      9. Using a scale of 1 through 10, with 0 = no pain and 10 = the worst pain:   What score is your headache now? 0   What score is your headache at its worst? 7   What score is your headache at its best? 0        10. Possible associated headache symptoms:  [x]  Sensitivity to light  [] Dizziness  [] Nasal or sinus pressure/ pain   [x] Sensitivity to noise  [] Vertigo  [] Problems with concentration  [x] Sensitivity to smells  [] Ringing in ears  [] Problems with memory    [] Blurred vision  [] Irritability  [] Problems with task completion   [] Double vision  [] Anger  []  Problems with relaxation  [] Loss of appetite  [] Anxiety  [] Neck tightness, Neck pain  [] Nausea   [] Nasal congestion  [] Vomiting         11. Headache improving factors:  [] Sleep  [] Heat  [] Darkness  [x] Ice  [x] Local pressure [x] Menses (period)  [x] Massage   [x] Medications:        12. Headache worsening factors:   [] Fatigue [] Sneezing  [] Changes in Weather  [x] Light [] Bending Over [] Stress  [x] Noise [] Ovulation  [] Multiple Sclerosis Flare-Up  [] Smells  [x] Menses  [] Food   [] Coughing [] Alcohol      13. Number of caffeinated drinks per day: 2      14. Number of diet drinks per day:  0    15. Bowel Habits: NORMAL   Please Check any Medications or Procedures tried/failed for Headache    AED Neuromodulators  MAOIs  Ergot Alkaloids    Acetazolamide (Diamox) []  Phenelzine (Nardil) [] Dihydroergotamine (Migranal) []   Carbamazepine (Tegretol) [] Tranylcypromine (Parnate) [] Ergotamine (Ergomar) []   Gabapentin (Neurontin) [] Antihistamine/Serotonergic  Triptans    Lacosamide (Vimpat) [] Cyproheptadine (Periactin) [] Almotriptan (Axert) []   Lamotrigine (Lamictal) [] Antihypertensives  Eletriptan (Relpax) []   Levatiracetam (Keppra) [] Atenolol (Tenormin) [] Frovatriptan (Frova) []   Oxcarbazepine (Trileptal) [] Bisoprostol (Zebeta) [] Naratriptan (Amerge) []   Phenobarbital [] Candesartan (Atacand) [] Rizatriptan (Maxalt) []     Nebivolol (Bystolic)  Sumatriptan (Imitrex) []   Levetiracetam (Keppra)  Cardeilol (Coreg) [] Zolmitriptan (Zomig) []   Phenytoin (Dilantin) [] Diltiazem (Cardizem) []     Pregabalin (Lyrica) [] Lisinopril (Prinivil, Zestril) [] Combo Abortives    Primidone (Mysoline) [] Metoprolol (Toprol) [] BC Powder []   Tiagabine (Gabatril) [] Nadolol (Corgard) [] Butalbital and Acetaminophen (Bupap) []   Topiramate (Topamax)  (Trokendi) [] Nicardipine (Cardene) []     Vigabatrin (Sabril) [] Nimodipine (Nimotop) [] Butalbital, Acetaminophen, and caffeine (Fioricet) []   Valproic Acid (Depakote) (Divalproex Sodium) [] Propranolol (Inderal) []     Zonisamide (Zonegran) [] Telmisartan (Micardis) [] Butalbital, Aspirin, and caffeine (Fiorinal) []   Benzodiazepines  Timolol (Blocadren) []     Alprazolam (Xanax) [] Verapamil (Calan, Verelan) [] Butalbital, Caffeine, Acetaminophen, and Codeine (Fioricet with Codeine) []   Diazepam (Valium) [] NSAIDs      Lorazepam (Ativan) [] Acetaminophen (Tylenol) [x]     Clonazepam (Klonopin) [] Acetylsalicylic Acid (Aspirin) [] Butalbital, Caffeine, Aspirin, and Codeine  (Fiorinal with Codeine) []   Antidepressants  Diclofenac (Cambia) []     Amitriptyline (Elavil) [] Ibuprofen (Motrin) []     Desipramine (Norpramin) [] Indomethacin (Indocin) [] Aspirin, Caffeine, and Acetaminophen (Excedrin) (Goodys) []   Doxepin (Sinequan) []  Ketoprofen (Orudis) []     Fluoxetine (Prozac) [] Ketorolac (Toradol) [] Acetaminophen, Dichloralphenazone, and Isometheptene (Midrin) []   Imipramine (Tofranil) [] Naproxen (Anaprox) (Aleve) []     Nortriptyline (Pamelor) [] Meclofenamic Acid (Meclomen) []     Venlafaxine (Effexor) [] Meloxicam (Mobic) [] Procedures    Desvenlafazine (Pristiq) [] Monoclonals  Greater occipital nerve block []   Duloxetine (Cymbalta) [] Eptinezumab [] Cervical, Thoracic, Lumbar radiofrequency ablation []   Trazadone [] Erenumab-aooe (Aimovig) [] Spenopalatine ganglion block []   Wellbutrin [] Galcanezumab (Emgality) [] Occipital neuro stimulation []   Protriptyline (Vivactil) [] Fremanazumab-vfrm (Ajovy)  Cervical, Thoracic, Lumbar, Caudal Epidural steroid injection []   Escitalopram (Lexapro) [] Other [] Sacroiliac joint steroid injection []   Celexa [] Memantine (Namenda) [] Transforaminal epidural steroid injection []     Botox [] Facet joint injections []     Baclofen (Lioresal) [] Cervical, Thoracic, Lumbar medial branch blocks []       Cefaly []       Gamma Core []       Iovera []       Transcranial Magnetic stimulation []                           Review of Systems   Constitutional:  Negative for activity change, appetite change, fatigue and fever.   HENT:  Negative for congestion, dental problem, hearing loss, sinus pressure, tinnitus, trouble swallowing and voice change.    Eyes:  Positive for visual disturbance. Negative for photophobia, pain and redness.   Respiratory:  Negative for cough, chest tightness and shortness of breath.    Cardiovascular:  Positive for palpitations. Negative for chest pain and leg swelling.   Gastrointestinal:  Negative for abdominal pain, blood in stool, nausea and vomiting.   Endocrine: Negative for cold intolerance and heat intolerance.   Genitourinary:  Negative for difficulty urinating, frequency, menstrual problem and urgency.   Musculoskeletal:  Negative for arthralgias, back pain, gait  problem, joint swelling, myalgias, neck pain and neck stiffness.   Skin: Negative.    Neurological:  Negative for dizziness, tremors, seizures, syncope, facial asymmetry, speech difficulty, weakness, light-headedness and numbness.   Hematological:  Negative for adenopathy. Does not bruise/bleed easily.   Psychiatric/Behavioral:  Negative for agitation, behavioral problems, confusion, decreased concentration, self-injury, sleep disturbance and suicidal ideas. The patient is not nervous/anxious and is not hyperactive.                Past Medical History:   Diagnosis Date    Allergy     seasonal    Bone cancer     Gall bladder inflammation     Mass of knee 3/29/2018    Soft tissue sarcoma     Tachycardia      Past Surgical History:   Procedure Laterality Date    BONE GRAFT Left 11/29/2018    femur     COLONOSCOPY  01/2013    COLONOSCOPY  08/2021    KNEE SURGERY      left femur open biopsy      tumor removal left leg      WISDOM TOOTH EXTRACTION       Family History   Problem Relation Name Age of Onset    Colon cancer Paternal Grandmother Peace     Cancer Paternal Grandmother Peace     No Known Problems Father      Diabetes Mother Lo     Ovarian cancer Maternal Aunt Vannessa     Diabetes Maternal Aunt Vannessa     Ovarian cancer Maternal Aunt Deann     Cancer Maternal Aunt Deann     Diabetes Maternal Aunt Deann     Diabetes Maternal Grandmother Yokasta     Diabetes Maternal Grandfather      Diabetes Maternal Uncle Joseluis     Diabetes Cousin      Cancer Maternal Aunt Seb      Social History     Socioeconomic History    Marital status:      Spouse name: Joseluis   Occupational History    Occupation:    Tobacco Use    Smoking status: Never    Smokeless tobacco: Never   Substance and Sexual Activity    Alcohol use: Yes     Comment: 1-2 a month    Drug use: Never    Sexual activity: Yes     Partners: Male     Birth control/protection: Condom, OCP     Social Determinants of Health     Financial Resource Strain:  Low Risk  (6/24/2024)    Overall Financial Resource Strain (CARDIA)     Difficulty of Paying Living Expenses: Not very hard   Food Insecurity: No Food Insecurity (6/24/2024)    Hunger Vital Sign     Worried About Running Out of Food in the Last Year: Never true     Ran Out of Food in the Last Year: Never true   Transportation Needs: No Transportation Needs (8/3/2023)    PRAPARE - Transportation     Lack of Transportation (Medical): No     Lack of Transportation (Non-Medical): No   Physical Activity: Sufficiently Active (6/24/2024)    Exercise Vital Sign     Days of Exercise per Week: 5 days     Minutes of Exercise per Session: 30 min   Stress: Stress Concern Present (6/24/2024)    [employer] - Occupational Stress Questionnaire     Feeling of Stress : Very much   Housing Stability: Low Risk  (8/3/2023)    Housing Stability Vital Sign     Unable to Pay for Housing in the Last Year: No     Number of Places Lived in the Last Year: 1     Unstable Housing in the Last Year: No     Review of patient's allergies indicates:   Allergen Reactions    Ethanol (ethyl alcohol)     Zithromax [azithromycin] Nausea Only     Current Outpatient Medications   Medication Sig    ALPRAZolam (XANAX) 0.25 MG tablet Take 1 tablet (0.25 mg total) by mouth daily as needed for Anxiety.    azelastine (ASTELIN) 137 mcg (0.1 %) nasal spray SPRAY 1 SPRAY BY NASAL ROUTE 2 TIMES DAILY.    busPIRone (BUSPAR) 10 MG tablet Take 1 tablet (10 mg total) by mouth 2 (two) times daily.    cetirizine (ZYRTEC) 10 MG tablet Take 10 mg by mouth once daily.    dicyclomine (BENTYL) 20 mg tablet Take 1 tablet (20 mg total) by mouth 3 (three) times daily as needed (abdominal pain/ bloating).    LORazepam (ATIVAN) 0.5 MG tablet Take by mouth.    LORazepam (ATIVAN) 1 MG tablet Take one tab 30 minutes prior to MRI    metoprolol tartrate (LOPRESSOR) 25 MG tablet Take 25 mg by mouth 2 (two) times daily.    montelukast (SINGULAIR) 10 mg tablet  TAKE 1 TABLET BY MOUTH EVERY DAY AT NIGHT    multivitamin (THERAGRAN) per tablet Take 2 tablets by mouth.    omeprazole (PRILOSEC) 40 MG capsule Take 40 mg by mouth once daily.    psyllium (METAMUCIL) powder Take 1 packet by mouth.    sumatriptan (IMITREX) 50 MG tablet Take 1 at onset of headache, may repeat in 2 hours to a max of 3 per day, 2 days per week    calcium citrate-vitamin D3 200 mg calcium -250 unit Tab Take 2 tablets by mouth Daily.  (Patient not taking: Reported on 11/6/2024)    LOW-OGESTREL, 28, 0.3-30 mg-mcg per tablet TAKE 1 TABLET BY MOUTH ONCE DAILY. SKIP PLACEBO WEEK. (Patient not taking: Reported on 11/6/2024)     No current facility-administered medications for this visit.         Objective:      Vitals:    04/23/25 1035   BP: (!) 136/92   Pulse: 91   Resp: 17   Temp: 97.2 °F (36.2 °C)     Body mass index is 38.81 kg/m².        Physical Exam    Constitutional:   She appears well-developed and well-nourished. She is well groomed    Neurological Exam:  General: well-developed, well-nourished, no distress  Mental status: Awake and alert  Speech language: No dysarthria or aphasia on conversation  Cranial nerves: Face symmetric  Motor: Moves all extremities well  Coordination: No ataxia. No tremor.       Review of Data:  Lab Results   Component Value Date     08/28/2023    K 4.3 08/28/2023     08/28/2023    CO2 21 (L) 08/28/2023    BUN 12 09/18/2023    BUN 10 08/28/2023    CREATININE 0.71 09/18/2023    CREATININE 0.66 08/28/2023    GLU 87 08/28/2023    HGBA1C 5.5 09/14/2023    AST 42 (H) 08/28/2023    ALT 27 08/28/2023    ALBUMIN 4.3 08/28/2023    PROT 8.0 08/28/2023    BILITOT 0.6 08/28/2023    CHOL 189 10/04/2024    HDL 51 10/04/2024    LDLCALC 119 (H) 10/04/2024    TRIG 94 10/04/2024       Lab Results   Component Value Date    WBC 6.7 09/18/2023    HGB 12.7 09/18/2023    HCT 39.8 09/18/2023    MCV 90 09/18/2023     09/18/2023       Lab Results   Component Value Date    TSH  1.210 04/01/2024           Results for orders placed or performed during the hospital encounter of 08/08/24   MRI Brain W WO Contrast    Narrative    EXAMINATION:  MRI BRAIN W WO CONTRAST    CLINICAL HISTORY:  visual disturbance;.  Unspecified visual disturbance    TECHNIQUE:  Multiplanar multisequence MR imaging of the brain was performed before and after the uneventful intravenous administration of 10 mL Gadavist.  Diffusion weighted imaging was performed.  ADC map was generated.    COMPARISON:  None.    FINDINGS:  Intracranial compartment:    There is no acute intracranial abnormality.  Brain volume, ventricular size and position are normal.  There is no hemorrhage or mass/mass effect.  There are no definite regions of abnormal signal intensity in the brain.  There are no regions of restricted diffusion to suggest acute infarction.  There is no pathologic enhancement.  The basilar cisterns are open.  There is no abnormal extra-axial fluid collection.  Flow voids indicating patency are present in the major vessels at the base of the brain.  The dural sinuses enhance normally.  The cerebellar tonsils are in normal position.  The sellar structures are normal.  The orbits are grossly normal.    Skull/extracranial contents: Marrow signal intensity in the clivus and calvarium is grossly normal.  There is a tiny mucous retention cyst in the floor of the right maxillary sinus.  Otherwise, the included paranasal sinuses and mastoid air cells are clear.  The included facial soft tissues and scalp are normal.      Impression    1.  Normal imaging of the brain.  There is no acute abnormality.  There is no hemorrhage, mass/mass effect, acute infarction.  There is no pathologic enhancement.      Electronically signed by: Brayan Guerrier MD  Date:    08/08/2024  Time:    11:28             Assessment and Plan   The patient is a 35 y/o female who meets criteria for migraine, low frequency episodic, predominantly lisa-menstrual.  She reports Visual disturbance today highly stereotypical of visual aura with a perfect description of fortification spectra. Recent eye exam negative. Weight stable. No papilledema on exam today. Onset of migraine coinciding with treatment for sarcoma of the LLE, likely multifactorial. Hormonal fluctuations from stopping OCD, lifestyles changes, stress related to condition etc. She has never had imaging before. Will obtain an MRI of the brain W WO to rule out conditions such demyelinating disease  -     MRI Brain W WO Contrast, normal    Menstrual migraine without status migrainosus, not intractable, Will treat the acute attacks and will hold prevention for now. Preemptive treatment is a consideration. Planning to conceive, we discussed options.  -     sumatriptan (IMITREX) 50 MG tablet; Take 1 at onset of headache, may repeat in 2 hours to a max of 3 per day, 2 days per week  Dispense: 9 tablet; Refill: 3        -      Fioricet #10/month for rescue  I have discussed the side effects of the medications prescribed and the patient acknowledges understanding   RTC in [address] [place] [address]      Alesha Schwartz M.D   of Neurology  ACGME Board Certified, Neurology  [facility], Board certified, headache Medicine  Medical Director, Headache and Facial Pain  [facility]

## 2025-04-29 ENCOUNTER — ON-DEMAND VIRTUAL (OUTPATIENT)
Dept: URGENT CARE | Facility: CLINIC | Age: 35
End: 2025-04-29
Payer: COMMERCIAL

## 2025-04-29 DIAGNOSIS — B00.1 COLD SORE: Primary | ICD-10-CM

## 2025-04-29 DIAGNOSIS — R59.9 SWELLING OF LYMPH NODE: ICD-10-CM

## 2025-04-29 PROCEDURE — 98005 SYNCH AUDIO-VIDEO EST LOW 20: CPT | Mod: 95,,, | Performed by: NURSE PRACTITIONER

## 2025-04-29 RX ORDER — VALACYCLOVIR HYDROCHLORIDE 1 G/1
TABLET, FILM COATED ORAL
Qty: 8 TABLET | Refills: 0 | Status: SHIPPED | OUTPATIENT
Start: 2025-04-29

## 2025-04-29 NOTE — PATIENT INSTRUCTIONS
Patient Education       Cold Sores (Oral Herpes) Discharge Instructions   About this topic   Cold sores are caused by the herpes simplex virus. They are often called fever blisters. This infection causes small, painful, fluid-filled blisters or sores on or near the lips and inside the mouth. The blisters are often painful and may start as red, brown, or white spots on the gums or lips. They may break open and form scabs. This infection may be spread from person to person.  The first time you have a cold sore you may also have:  Swelling in the neck  Fever  Pain in your mouth and throat  Body aches  You may get cold sores just one time or you may get them again and again. If you get cold sores again, you may only have the blisters. You may not have the other signs at all, or they may be much milder.  What care is needed at home?   Ask your doctor what you need to do when you go home. Make sure you ask questions if you do not understand what the doctor says. This way you will know what you need to do.  Apply ice or a cold, wet towel on the sores a few times each day. This will help with redness and swelling.  Your doctor may order a mouth rinse that can numb your gums and help with mouth pain.  Your doctor may give you a cream or ointment to apply as you feel the sore developing. Be sure to follow the instructions for use.  Your doctor may give you a drug to take as you feel the cold sore starting. Be sure to follow the instructions for use.  Wash your hands with soap and water every time you touch your sore.  When you have a cold sore, protect others:  Do not kiss anyone.  Avoid sharing personal items like razors, towels, makeup, cups, or eating utensils.  Do not share a toothbrush. Get a new toothbrush when the cold sores are gone.  Do not give anyone oral sex.  What follow-up care is needed?   Your doctor may ask you to make visits to the office to check on your progress. Be sure to keep these visits.  Talk with  your doctor about how often you are having cold sores. Your doctor may be able to help you have fewer cold sores.  What drugs may be needed?   The doctor may order drugs to:  Help with pain  Promote healing  Prevent cold sores from coming back  What changes to diet are needed?   Avoid foods and juices that are high in acid like tomatoes, oranges, and grapefruit.  What problems could happen?   Infection moves to other parts of the body  What can be done to prevent this health problem?   Avoid kissing people who have cold sores.  Do not share cups, eating utensils, towels, or other items used by someone with cold sores.  Use sunscreen on your lips and face. Avoid being in the sun for long periods of time.  Wash your hands often. Try not to touch the cold sore. This will help to prevent spreading the infection to your eyes, genitals, or to other people.  When do I need to call the doctor?   Signs of infection. These include a fever of 100.4°F (38°C) or higher, chills.  Signs of wound infection. These include swelling, redness, warmth around the sore; too much pain when touched; yellowish, greenish, or bloody discharge; foul smell coming from the sore.  You have a weakened immune system and get a cold sore  You get a cold sore near your eye  The sores make it hard for you to talk, eat, or swallow  Cold sores do not heal in 10 days  You get cold sores often  Teach Back: Helping You Understand   The Teach Back Method helps you understand the information we are giving you. After you talk with the staff, tell them in your own words what you learned. This helps to make sure the staff has described each thing clearly. It also helps to explain things that may have been confusing. Before going home, make sure you can do these:  I can tell you about my condition.  I can tell you how to care for my cold sore.  I can tell you what I will do if I have swelling, redness, or warmth around my sore or it does not heal in 10  days.  Where can I learn more?   [facility]  http://kidshealth.org/parent/infections/skin/cold_sores.html   [facility]  https://www.nhs.uk/conditions/cold-sores/   Last Reviewed Date   2021-04-21  Consumer Information Use and Disclaimer   This information is not specific medical advice and does not replace information you receive from your health care provider. This is only a brief summary of general information. It does NOT include all information about conditions, illnesses, injuries, tests, procedures, treatments, therapies, discharge instructions or life-style choices that may apply to you. You must talk with your health care provider for complete information about your health and treatment options. This information should not be used to decide whether or not to accept your health care providers advice, instructions or recommendations. Only your health care provider has the knowledge and training to provide advice that is right for you.  Copyright   Copyright © 2021 [employer], [employer] and its affiliates and/or licensors. All rights reserved.        Patient Education     Lymphadenitis Discharge Instructions   About this topic   Lymphadenitis is another word for a swollen lymph node. A lymph node is a pea-shaped part of the lymph system that helps defend our body from germs. You have lymph nodes all over your body. They help trap foreign material and other cells that do not belong inside the body.  A swollen lymph node may mean there is an infection or disease in the body. Treatment for this condition depends on the cause. Most often, you will need drugs to treat your illness.  What care is needed at home?   Ask your doctor what you need to do when you go home. Make sure you ask questions if you do not understand what the doctor says.  Do not squeeze or try to drain the lymph node. This can make any infection you may have worse.  Place an ice pack or a bag of frozen peas wrapped in a towel over the  painful part. Never put ice right on the skin. Do not leave the ice on more than 10 to 15 minutes at a time.  If your doctor tells you to use heat, put a heating pad on your painful area for no more than 20 minutes at a time. Never go to sleep with a heating pad on as this can cause burns.  What follow-up care is needed?   Your doctor may ask you to make visits to the office to check on your progress. Be sure to keep these visits.  What drugs may be needed?   The doctor may order drugs to:  Help with pain and swelling  Treat an infection  Treat the illness that causes the lymph nodes to swell and be painful  What problems could happen?   Infection  Pain  When do I need to call the doctor?   Signs of infection. These include a fever of 100.4°F (38°C) or higher, chills.  More swelling, redness, or warmth over your lymph node.  Lymph node is draining fluid or pus.  Lymph nodes seem to be getting bigger.  Night sweats  Trouble swallowing or breathing  Teach Back: Helping You Understand   The Teach Back Method helps you understand the information we are giving you. After you talk with the staff, tell them in your own words what you learned. This helps to make sure the staff has described each thing clearly. It also helps to explain things that may have been confusing. Before going home, make sure you can do these:  I can tell you about my condition.  I can tell you what may help ease my pain.  I can tell you what I will do if I have a fever, my lymph nodes stay large or seem to be getting bigger, or I have trouble breathing or swallowing.  Last Reviewed Date   2019-04-18  Consumer Information Use and Disclaimer   This generalized information is a limited summary of diagnosis, treatment, and/or medication information. It is not meant to be comprehensive and should be used as a tool to help the user understand and/or assess potential diagnostic and treatment options. It does NOT include all information about conditions,  treatments, medications, side effects, or risks that may apply to a specific patient. It is not intended to be medical advice or a substitute for the medical advice, diagnosis, or treatment of a health care provider based on the health care provider's examination and assessment of a patients specific and unique circumstances. Patients must speak with a health care provider for complete information about their health, medical questions, and treatment options, including any risks or benefits regarding use of medications. This information does not endorse any treatments or medications as safe, effective, or approved for treating a specific patient. Up[employer] Inc[employer] and its affiliates disclaim any warranty or liability relating to this information or the use thereof. The use of this information is governed by the Terms of Use, available at https://www.woltersHYGIEIAuwer.com/en/know/clinical-effectiveness-terms   Copyright   Copyright © 2024 [employer], [employer] and its affiliates and/or licensors. All rights reserved.

## 2025-05-02 PROBLEM — I89.0 LYMPHEDEMA: Status: ACTIVE | Noted: 2025-05-02

## 2025-05-05 ENCOUNTER — PATIENT MESSAGE (OUTPATIENT)
Dept: REHABILITATION | Facility: HOSPITAL | Age: 35
End: 2025-05-05
Payer: COMMERCIAL

## 2025-05-05 ENCOUNTER — TELEPHONE (OUTPATIENT)
Dept: REHABILITATION | Facility: HOSPITAL | Age: 35
End: 2025-05-05
Payer: COMMERCIAL

## 2025-05-05 DIAGNOSIS — C49.9 SARCOMA: Primary | ICD-10-CM

## 2025-05-05 DIAGNOSIS — I89.0 LYMPHEDEMA: ICD-10-CM

## 2025-05-05 NOTE — TELEPHONE ENCOUNTER
Called pt to schedule her for PT/lymph apts as requested. Location and check in process were discussed. An apt reminder was sent via [employer].

## 2025-05-07 ENCOUNTER — TELEPHONE (OUTPATIENT)
Dept: REHABILITATION | Facility: HOSPITAL | Age: 35
End: 2025-05-07
Payer: COMMERCIAL

## 2025-05-07 NOTE — TELEPHONE ENCOUNTER
Returned call to pt to reschedule her PT?lymph apt from 5/8/25 to 6/6/25 at 4:00 pm. Pt wants to wait until she returns from vacation to start PT. Location and check-in process known to pt.  ----- Message from Allie sent at 5/7/2025  8:12 AM CDT -----  Type: Needs Medical AdviceWho Called:  pt Symptoms (please be specific):  How long has patient had these symptoms:  Pharmacy name and phone #:  Best Call Back Number: [phone] Information: pt is requesting a call back to reschedule appt on 5/8

## 2025-05-19 ENCOUNTER — TELEPHONE (OUTPATIENT)
Dept: REHABILITATION | Facility: HOSPITAL | Age: 35
End: 2025-05-19
Payer: COMMERCIAL

## 2025-05-19 NOTE — TELEPHONE ENCOUNTER
Pt called to cancel her PT/Lymph apts on 5/21/25 and 5/23/25 and reschedule to 6/6/25 and 6/9/25 at 4:00 pm. Pt verbalized her acceptance of the apt dates. Location and check-in process known to pt.

## 2025-05-19 NOTE — TELEPHONE ENCOUNTER
Returned call to pt to cancel PT apts as requested; no ans left detailed voicemail for pt to return my call to cancel her PT apts.  ----- Message from [place] sent at 5/19/2025 10:43 AM CDT -----  Type: Needs Medical AdviceWho Called:  Patient Symptoms (please be specific):  How long has patient had these symptoms:  Pharmacy name and phone #:  Best Call Back Number: [phone] Information: Patient is requesting a call back to cancel some of her PT appts..

## 2025-05-24 ENCOUNTER — OFFICE VISIT (OUTPATIENT)
Dept: URGENT CARE | Facility: CLINIC | Age: 35
End: 2025-05-24
Payer: COMMERCIAL

## 2025-05-24 VITALS
DIASTOLIC BLOOD PRESSURE: 97 MMHG | HEART RATE: 110 BPM | HEIGHT: 67 IN | SYSTOLIC BLOOD PRESSURE: 156 MMHG | TEMPERATURE: 98 F | BODY MASS INDEX: 38.45 KG/M2 | WEIGHT: 245 LBS | RESPIRATION RATE: 18 BRPM | OXYGEN SATURATION: 99 %

## 2025-05-24 DIAGNOSIS — R03.0 ELEVATED BLOOD PRESSURE READING: ICD-10-CM

## 2025-05-24 DIAGNOSIS — J32.9 CLINICAL SINUSITIS: Primary | ICD-10-CM

## 2025-05-24 DIAGNOSIS — R09.81 SINUS CONGESTION: ICD-10-CM

## 2025-05-24 DIAGNOSIS — J02.9 SORE THROAT: ICD-10-CM

## 2025-05-24 DIAGNOSIS — R09.82 POSTNASAL DRIP: ICD-10-CM

## 2025-05-24 PROCEDURE — 99214 OFFICE O/P EST MOD 30 MIN: CPT | Mod: S$GLB,,, | Performed by: PHYSICIAN ASSISTANT

## 2025-05-24 RX ORDER — IPRATROPIUM BROMIDE 21 UG/1
2 SPRAY, METERED NASAL 2 TIMES DAILY
Qty: 30 ML | Refills: 0 | Status: SHIPPED | OUTPATIENT
Start: 2025-05-24

## 2025-05-24 RX ORDER — AMOXICILLIN AND CLAVULANATE POTASSIUM 875; 125 MG/1; MG/1
1 TABLET, FILM COATED ORAL EVERY 12 HOURS
Qty: 20 TABLET | Refills: 0 | Status: SHIPPED | OUTPATIENT
Start: 2025-05-24 | End: 2025-06-03

## 2025-05-24 RX ORDER — PREDNISONE 20 MG/1
TABLET ORAL
Qty: 10 TABLET | Refills: 0 | Status: SHIPPED | OUTPATIENT
Start: 2025-05-24

## 2025-05-24 NOTE — PROGRESS NOTES
"Subjective:      Patient ID: Rosangela Salazar is a 35 y.o. female.    Vitals:  height is 5' 7" (1.702 m) and weight is 111.1 kg (245 lb). Her oral temperature is 98.1 °F (36.7 °C). Her blood pressure is 156/97 (abnormal) and her pulse is 110. Her respiration is 18 and oxygen saturation is 99%.     Chief Complaint: Laryngitis    Sore Throat   This is a new problem. The current episode started in the past 7 days. The problem has been unchanged. There has been no fever. The pain is at a severity of 3/10. The pain is mild. Associated symptoms include congestion, coughing, swollen glands and trouble swallowing. Pertinent negatives include no abdominal pain, diarrhea, drooling, ear discharge, ear pain, headaches, hoarse voice, plugged ear sensation, neck pain, shortness of breath, stridor or vomiting. She has had no exposure to strep or mono. She has tried nothing for the symptoms.     Constitution: Negative for chills, sweating, fatigue and fever.   HENT:  Positive for congestion, postnasal drip, sinus pressure, sore throat and trouble swallowing. Negative for ear pain, ear discharge, drooling, nosebleeds, foreign body in nose, sinus pain and voice change.    Neck: Negative for neck pain, neck stiffness, painful lymph nodes and neck swelling.   Cardiovascular:  Negative for chest pain, leg swelling, palpitations, sob on exertion and passing out.   Eyes:  Negative for eye pain, eye redness, photophobia, double vision, blurred vision and eyelid swelling.   Respiratory:  Positive for cough and sputum production. Negative for chest tightness, bloody sputum, shortness of breath, stridor and wheezing.    Gastrointestinal:  Negative for abdominal pain, abdominal bloating, nausea, vomiting, constipation, diarrhea and heartburn.   Musculoskeletal:  Negative for joint pain, joint swelling, abnormal ROM of joint, back pain, muscle cramps and muscle ache.   Skin:  Negative for rash and hives.   Allergic/Immunologic: Negative for seasonal " allergies, food allergies, hives, itching and sneezing.   Neurological:  Negative for dizziness, light-headedness, passing out, loss of balance, headaches, altered mental status, loss of consciousness and seizures.   Hematologic/Lymphatic: Negative for swollen lymph nodes.   Psychiatric/Behavioral:  Negative for altered mental status and nervous/anxious. The patient is not nervous/anxious.       Objective:     Physical Exam   Constitutional: She is oriented to person, place, and time. She appears well-developed. She is cooperative.  Non-toxic appearance. She does not appear ill. No distress.   HENT:   Head: Normocephalic and atraumatic.   Ears:   Right Ear: Hearing, tympanic membrane, external ear and ear canal normal.   Left Ear: Hearing, tympanic membrane, external ear and ear canal normal.   Nose: Mucosal edema and rhinorrhea present. No nasal deformity. No epistaxis. Right sinus exhibits maxillary sinus tenderness and frontal sinus tenderness. Left sinus exhibits maxillary sinus tenderness and frontal sinus tenderness.   Mouth/Throat: Uvula is midline and mucous membranes are normal. No trismus in the jaw. Normal dentition. No uvula swelling. Posterior oropharyngeal erythema and cobblestoning present. No oropharyngeal exudate, posterior oropharyngeal edema or tonsillar abscesses. No tonsillar exudate.   Eyes: Conjunctivae and lids are normal. No scleral icterus.   Neck: Trachea normal and phonation normal. Neck supple. No edema present. No erythema present. No neck rigidity present.   Cardiovascular: Normal rate, regular rhythm, normal heart sounds and normal pulses.   Pulmonary/Chest: Effort normal and breath sounds normal. No accessory muscle usage or stridor. No respiratory distress. She has no decreased breath sounds. She has no wheezes. She has no rhonchi. She has no rales.   Abdominal: Normal appearance.   Musculoskeletal: Normal range of motion.         General: No deformity or edema. Normal range of  motion.   Lymphadenopathy:     She has no cervical adenopathy.   Neurological: She is alert and oriented to person, place, and time. She exhibits normal muscle tone. Coordination normal.   Skin: Skin is warm, dry, intact, not diaphoretic, not pale and no rash. Capillary refill takes less than 2 seconds.   Psychiatric: Her speech is normal and behavior is normal. Judgment and thought content normal.   Nursing note and vitals reviewed.    Assessment:     1. Clinical sinusitis    2. Sinus congestion    3. Postnasal drip    4. Sore throat    5. Elevated blood pressure reading        Plan:       Clinical sinusitis    Sinus congestion    Postnasal drip    Sore throat    Elevated blood pressure reading    Other orders  -     predniSONE (DELTASONE) 20 MG tablet; Take 40mg (2 tablets) x 2 days, 30mg (1.5 tablets) x 2 days, 20mg (1 tablet) x 2 days, 10mg (0.5 tablet) x 2 days.  Dispense: 10 tablet; Refill: 0  -     amoxicillin-clavulanate 875-125mg (AUGMENTIN) 875-125 mg per tablet; Take 1 tablet by mouth every 12 (twelve) hours. for 10 days  Dispense: 20 tablet; Refill: 0  -     (Magic mouthwash) 1:1:1 diphenhydrAMINE(Benadryl) 12.5mg/5ml liq, aluminum & magnesium hydroxide-simethicone (Maalox), LIDOcaine viscous 2%; 10 cc swish and spit every 4 hours as needed for mouth sores or sore throat  Dispense: 90 mL; Refill: 0  -     ipratropium (ATROVENT) 21 mcg (0.03 %) nasal spray; 2 sprays by Each Nostril route 2 (two) times daily.  Dispense: 30 mL; Refill: 0      Patient Instructions   If you were prescribed a narcotic or controlled medication, do not drive or operate heavy equipment or machinery while taking these medications.  You must understand that you've received an Urgent Care treatment only and that you may be released before all your medical problems are known or treated. You, the patient, will arrange for follow up care as instructed.  Follow up with your PCP or specialty clinic as directed if not improved or as  needed. You can call [phone] to schedule an appointment with the appropriate provider.  If your condition worsens we recommend that you receive another evaluation at the Emergency Department for any concerns or worsening of condition.  Patient aware and verbalized understanding.    Magic Mouthwash RX as prescribed - SWISH AND SPIT OUT - DO NOT SWALLOW BECAUSE OF LIDOCAINE AND BENADRYL - see coupon below and give to pharmacy.  Increase fluids: Cool liquids as much as possible.   Avoid any foods or beverages that may cause irritation to the throat (spicy, acidic, rough, etc.).  Rest is important.  Avoid contact with sick individuals.  Humidifier use at home.  THROW AWAY TOOTHBRUSH AND START WITH NEW ONE.  AVOID SHARING FOOD/DRINK AS DISCUSSED.   Can take OTC Claritin or Zyrtec or Allegra (plain) daily as needed for seasonal allergies/nasal congestion, etc.  Can take OTC Flonase Nasal [place] as needed for nasal congestion/seasonal allergies, etc.  Follow-up with your PCP in the next 24-72hrs or sooner for re-evaluation especially if no improvement in symptoms.  ER precautions given to patient.  Patient aware, verbalized understanding and agreed with plan of care.    PLEASE TRY MAGIC MOUTHWASH PRESCRIPTION DISCOUNT CARD BELOW:

## 2025-05-24 NOTE — PATIENT INSTRUCTIONS
If you were prescribed a narcotic or controlled medication, do not drive or operate heavy equipment or machinery while taking these medications.  You must understand that you've received an Urgent Care treatment only and that you may be released before all your medical problems are known or treated. You, the patient, will arrange for follow up care as instructed.  Follow up with your PCP or specialty clinic as directed if not improved or as needed. You can call [phone] to schedule an appointment with the appropriate provider.  If your condition worsens we recommend that you receive another evaluation at the Emergency Department for any concerns or worsening of condition.  Patient aware and verbalized understanding.    Magic Mouthwash RX as prescribed - SWISH AND SPIT OUT - DO NOT SWALLOW BECAUSE OF LIDOCAINE AND BENADRYL - see coupon below and give to pharmacy.  Increase fluids: Cool liquids as much as possible.   Avoid any foods or beverages that may cause irritation to the throat (spicy, acidic, rough, etc.).  Rest is important.  Avoid contact with sick individuals.  Humidifier use at home.  THROW AWAY TOOTHBRUSH AND START WITH NEW ONE.  AVOID SHARING FOOD/DRINK AS DISCUSSED.   Can take OTC Claritin or Zyrtec or Allegra (plain) daily as needed for seasonal allergies/nasal congestion, etc.  Can take OTC Flonase Nasal [place] as needed for nasal congestion/seasonal allergies, etc.  Follow-up with your PCP in the next 24-72hrs or sooner for re-evaluation especially if no improvement in symptoms.  ER precautions given to patient.  Patient aware, verbalized understanding and agreed with plan of care.    PLEASE TRY MAGIC MOUTHWASH PRESCRIPTION DISCOUNT CARD BELOW:

## 2025-05-27 ENCOUNTER — TELEPHONE (OUTPATIENT)
Dept: REHABILITATION | Facility: HOSPITAL | Age: 35
End: 2025-05-27
Payer: COMMERCIAL

## 2025-05-27 NOTE — TELEPHONE ENCOUNTER
Returned call to pt  to cancel her PT/lymph apt on today due to the pt not feeling well. She has an apt already scheduled on 5/29/25 at 4:00 pm and will let us know if she should reschedule if not feeling better. Apt cancelled as requested.  ----- Message from [employer] [place] sent at 5/27/2025  8:46 AM CDT -----  Type: Needs Medical AdviceWho Called:  [place] Call Back Number: [phone] Information: Rosangela states she has some kind of infection like sore throat and is on antibiotics since Saturday  and needs to know if she can still come in or needs to reschedule  her 5/27 appointment please call to further assist thank you .

## 2025-05-29 ENCOUNTER — CLINICAL SUPPORT (OUTPATIENT)
Dept: REHABILITATION | Facility: HOSPITAL | Age: 35
End: 2025-05-29
Payer: COMMERCIAL

## 2025-05-29 DIAGNOSIS — I89.0 LYMPHEDEMA: Primary | ICD-10-CM

## 2025-05-29 PROCEDURE — 97140 MANUAL THERAPY 1/> REGIONS: CPT | Mod: PN

## 2025-05-29 NOTE — PROGRESS NOTES
Physical Therapy Daily Treatment Note/Lymphedema Management     Name: Rosangela Salazar  Clinic Number: [id number]    Therapy Diagnosis:   Encounter Diagnosis   Name Primary?    Lymphedema Yes     Physician: Mattie Castro MD    Visit Date: 5/29/2025    Physician Orders: PT eval and treat  Medical Diagnosis:   Encounter Diagnosis   Name Primary?    Lymphedema Yes     Evaluation Date: 5/2/2025  Authorization Period Expiration: through 12/31/2025  Plan of Care Certification Period: 5/2/2025 to 7/2/2025  Visit #/Visits authorized: 1/ 12 ( 12 auth)     Time In: 4:05  Time Out: 5:45 PM  Total Billable Time: 90 minutes    Precautions: Standard and bone graft still in healing phase     Subjective     Pt reports: not pregnant, on steroids now so not trying at this moment. .  She was compliant with home exercise program.  Response to previous treatment: good  Functional change: none    Pain: 0/10  Location: none    Objective     Rosangela received therapeutic exercises to develop strength, endurance, ROM, and flexibility for 0 minutes including: next tx will incorporate lymphatic flow, L quad,  glut, hamstring strengthening.      Rosangela received the following manual therapy techniques:90 min Manual Lymphatic Drainage were applied to the: Manual lymphatic drainage to left short neck series, cervical terminus , axilla, deep abdominals, sternal nodes, paravertebral nodes, AIA anastomosis , YENI anastomosis , inguinal nodes, popliteal nodes, full leg sequence, and rework accessing all watershed areas on trunk with concurrent use of pneumatic compression pump set at 30mmHg for 20 minutes.  PT applied gentle gradient compression bandages to include: stockinet, cast padding, CompriFoam, and short stretch bandages 1 6 cm, 2 8 cm, 1 10 and 12 cm from foot to thigh.  Pt instructed to wear wrap for 24 hours and removed if she experiences SOB, unusual pain in leg or vascular compromise. Pt with good understanding of instruction.   Educated on  plunger technique for patellar mobility / scar tissue/ fascial restrictions and pt plans to buy a carol plunger from [employer] and will bring into next session.     Deferred secondary not working in clinic Lymphatouch applied to L leg set at 60mmHg for 0 minutes.         Home Exercises Provided and Patient Education Provided     Education provided:   - garments will be measured for if she tolerates bandaging, and we will determine level of compression based on how she tolerates wraps.    Written Home Exercises Provided: next tx.  Exercises were reviewed and Rosangela was able to demonstrate them prior to the end of the session.  Rosangela demonstrated good  understanding of the education provided.     See EMR under Patient Instructions for exercises provided next tx.    Assessment    Pt was last seen for eval, and no changes to plan of care. Pt received manual techniques  MLD and compression wrapping for decongestion to left LE, with good tolerance.    Rosangela Is progressing well towards her goals.   Pt prognosis is Good.     Pt will continue to benefit from skilled outpatient physical therapy to address the deficits listed in the problem list box on initial evaluation, provide pt/family education and to maximize pt's level of independence in the home and community environment.     Pt's spiritual, cultural and educational needs considered and pt agreeable to plan of care and goals.     Anticipated barriers to physical therapy: none  Rosangela is a 35 y.o. female referred to outpatient physical therapy with a medical diagnosis of L LE lymphedema s/p 25 radiation treatments to treat a sarcoma which pt also had surgical removal of sarcoma and tissue. Pt presents with decreased ROM in L knee, decreased patellar mobility, weakness in quad and gluts as well as lymphedema affecting left lower leg and ankle. PT will benefit from wrapping l lower extremity to above knee into L hip and we will assess what level of compression she will need for  her garments, currently using 20-30 mmHg but not daily.      Plan of care discussed with patient: Yes  Pt's spiritual, cultural and educational needs considered and patient is agreeable to the plan of care and goals as stated below:      Anticipated barriers for therapy: none     Medical Necessity is demonstrated by the following:  History  Co-morbidities and personal factors that may impact the plan of care Co-morbidities:   high BMI and history of cancer     Personal Factors:   Pt reports trying to get pregnant at this time       moderate   Examination  Body Structures and Functions, activity limitations and participation restrictions that may impact the plan of care Body Systems:    ROM  strength  transitions  edema  scar formation     Activity limitations:   Mobility  Cannot get up and down from floor, stairs     Self care  no deficits     Domestic Life  no deficits     Participation Restrictions:   Should pt get pregnant she is to inform us and get clearance from OB that she can participate with lymph tx             moderate   Clinical Presentation evolving clinical presentation with changing clinical characteristics moderate   Decision Making/ Complexity Score: moderate         GOALS  Short Term Goals: 4 weeks   Patient will demonstrate 100% knowledge of lymphedema precautions and signs of infection.   Patient will tolerate iADLs with multilayered bandaging for 24-48 hours.   Decrease girth by average 1 cm for improved mobility and safety with iADLs.  Patient to report compliance sleeping on 30 degree incline for lymphatic protection.   Patient will demonstrate proper posture with sitting and standing to decrease detrimental affects to adjacent structures.   Patient will tolerate HEP for better progression toward LTGs and self-management of presentation.   Improve patellar mobility and teach pt how to perform self patellar mobilization     Long Term Goals: 8 weeks  Patient will be independent with HEP for  self-management of symptoms and current status.   Decrease girth by average 2 cm for improved mobility and safety with iADLs.  Patient will perform self lymphatic drainage techniques for long term management of lymphedema.   Patient to don/doff compression garments for self management of lymphedema.   Patient to obtain pneumatic compression pump for daily home use to assist in maintaining reduced swelling.   Patient to demonstrate LE AROM to WFL for improved ability to perform ADLs (self care, dressing, bathing) and iADLs (household chores, yard work) independently.   Pt to be able to get down to floor and up from floor with greater ease.      Plan   we will assess if she needs 20-30 or 30-40 for a thigh high garment as well as best night time garment for pt. Pt reports difficulty wit silicone on a garment previously and we recommend the It Stays product if we don't use silicone beads.  Plan of Care Certification: 5/2/2025 to 7/2/2025     Outpatient Physical Therapy 2 time(s) a week for 6-8 weeks to include the following interventions: patient education, HEP, therapeutic exercises, neuromuscular re-education, therapeutic activity, manual therapy including pneumatic compression pump and lymphatouch, self care/home management, modalities, gait training, decongestive massage, multi-layered bandaging, self massage, self bandaging, and assistance in obtaining appropriate compression garment.        Colette Alcocer, PT, CLT

## 2025-05-31 ENCOUNTER — PATIENT MESSAGE (OUTPATIENT)
Dept: OBSTETRICS AND GYNECOLOGY | Facility: CLINIC | Age: 35
End: 2025-05-31
Payer: COMMERCIAL

## 2025-06-02 ENCOUNTER — TELEPHONE (OUTPATIENT)
Dept: REHABILITATION | Facility: HOSPITAL | Age: 35
End: 2025-06-02
Payer: COMMERCIAL

## 2025-06-02 ENCOUNTER — CLINICAL SUPPORT (OUTPATIENT)
Dept: REHABILITATION | Facility: HOSPITAL | Age: 35
End: 2025-06-02
Attending: INTERNAL MEDICINE
Payer: COMMERCIAL

## 2025-06-02 DIAGNOSIS — I89.0 LYMPHEDEMA: Primary | ICD-10-CM

## 2025-06-02 PROCEDURE — 97140 MANUAL THERAPY 1/> REGIONS: CPT | Mod: PN

## 2025-06-04 ENCOUNTER — TELEPHONE (OUTPATIENT)
Dept: REHABILITATION | Facility: HOSPITAL | Age: 35
End: 2025-06-04
Payer: COMMERCIAL

## 2025-06-06 ENCOUNTER — CLINICAL SUPPORT (OUTPATIENT)
Dept: REHABILITATION | Facility: HOSPITAL | Age: 35
End: 2025-06-06
Payer: COMMERCIAL

## 2025-06-06 DIAGNOSIS — I89.0 LYMPHEDEMA: Primary | ICD-10-CM

## 2025-06-06 PROCEDURE — 97140 MANUAL THERAPY 1/> REGIONS: CPT | Mod: PN,CQ

## 2025-06-09 ENCOUNTER — CLINICAL SUPPORT (OUTPATIENT)
Dept: REHABILITATION | Facility: HOSPITAL | Age: 35
End: 2025-06-09
Payer: COMMERCIAL

## 2025-06-09 DIAGNOSIS — I89.0 LYMPHEDEMA: Primary | ICD-10-CM

## 2025-06-09 PROCEDURE — 97140 MANUAL THERAPY 1/> REGIONS: CPT | Mod: PN

## 2025-06-09 PROCEDURE — 97110 THERAPEUTIC EXERCISES: CPT | Mod: PN

## 2025-06-09 NOTE — PATIENT INSTRUCTIONS
Quad Set        With other leg bent, foot flat, slowly tighten muscles on thigh of straight leg while counting out loud to ____. Repeat with other leg.  Repeat ____ times. Do ____ sessions per day.           Hip internal rotation on your side, with knee extension, use of L hip on ball pressing ball onto floor, see video

## 2025-06-09 NOTE — PROGRESS NOTES
Physical Therapy Daily Treatment Note/Lymphedema Management     Name: Rosangela Salazar  Clinic Number: [id number]    Therapy Diagnosis:   Encounter Diagnosis   Name Primary?    Lymphedema Yes     Physician: Mattie Castro MD    Visit Date: 6/9/2025    Physician Orders: PT eval and treat  Medical Diagnosis:   Encounter Diagnosis   Name Primary?    Lymphedema Yes     Evaluation Date: 5/2/2025  Authorization Period Expiration: through 12/31/2025  Plan of Care Certification Period: 5/2/2025 to 7/2/2025  Visit #/Visits authorized: 4/ 12 ( 12 auth)   PTA visit #0/5    Time In: 4:05 PM   Time Out: 5:20 PM  Total Billable Time: 75 minutes    Precautions: Standard and bone graft still in healing phase     Subjective     Pt reports:have not gotten plunger yet, that she isn't allergic to the silicone, just burns a little sometimes. Didn't bother her today at all. When bandages were removed was the skinniest my leg has been since I had the surgery.   She was compliant with home exercise program.  Response to previous treatment: good  Functional change: none    Pain: 0/10  Location: none    Objective   GARMENTS: Recommendations:   According to what her insurance plan will cover:  Thigh high ready to wear Juzo basic, with silicone sleeve size III/regular 20-30 mmHg  Farrow wrap toe cap, ready to wear size small  Medi Juxtafit premium foot and ankle wrap  S[employer] tribute wrap full leg size medium  AR 6/9/2025 sent orders to [facility] today  Rosangela received therapeutic exercises to develop strength, endurance, ROM, and flexibility for 15 minutes including: provided hand out for lymphatic flow pt to try at home.  Slr, attempted single limb bridge however pt with knee pain in left knee.   Sidelye hip adduction with verbal cues   Sidelye on R side with ball under lift knee that was flexed then performed hip IR and knee extension  and return x 10 reps  Standing L VMO work with red t band      Rosangela received the following  manual therapy techniques 60 min Manual Lymphatic Drainage were applied to the: (deferred Manual lymphatic drainage to left short neck series, cervical terminus , axilla, deep abdominals, sternal nodes, paravertebral nodes, AIA anastomosis , YENI anastomosis , inguinal nodes, popliteal nodes, full leg sequence, and rework accessing all watershed areas on trunk)  Concurrent use of pneumatic compression pump set at 30mmHg for 20 minutes.  PT applied gentle gradient compression bandages to include: stockinet, cast padding, CompriFoam, and short stretch bandages 1 6 cm, 2 8 cm, 1 10 cm from foot to thigh.  Pt instructed to wear wrap for 24-48 hours and removed if she experiences SOB, unusual pain in leg or vascular compromise. Pt with good understanding of instruction.   Educated on plunger technique for patellar mobility / scar tissue/ fascial restrictions and pt plans to buy a carol plunger from [employer] and will bring into next session.     Deferred secondary not working in clinic Lymphatouch applied to L leg set at 60mmHg for 0 minutes.         Home Exercises Provided and Patient Education Provided     Education provided:   - garments will be measured for if she tolerates bandaging, and we will determine level of compression based on how she tolerates wraps.    Written Home Exercises Provided: yes lymphatic flow.  Exercises were reviewed and Rosangela was able to demonstrate them prior to the end of the session.  Rosangela demonstrated good  understanding of the education provided.     See EMR under Patient Instructions for exercises provided next tx.    Assessment   Pt will benefit from 20-30 mmHg thigh high.    Pt received pump trial and compression wrapping for decongestion to left LE, with good tolerance. Will use plunger on knee for scar tissue. Need to determine compression class of definitive garments. Pt agreeable to have a garment with silicone top band, explained to patient the the silicone needs to cleaned daily to  prevent build up of skin oils to prevent irritation.    Rosangela Is progressing well towards her goals.   Pt prognosis is Good.     Pt will continue to benefit from skilled outpatient physical therapy to address the deficits listed in the problem list box on initial evaluation, provide pt/family education and to maximize pt's level of independence in the home and community environment.     Pt's spiritual, cultural and educational needs considered and pt agreeable to plan of care and goals.     Anticipated barriers to physical therapy: none  Rosangela is a 35 y.o. female referred to outpatient physical therapy with a medical diagnosis of L LE lymphedema s/p 25 radiation treatments to treat a sarcoma which pt also had surgical removal of sarcoma and tissue. Pt presents with decreased ROM in L knee, decreased patellar mobility, weakness in quad and gluts as well as lymphedema affecting left lower leg and ankle. PT will benefit from wrapping l lower extremity to above knee into L hip and we will assess what level of compression she will need for her garments, currently using 20-30 mmHg but not daily.      Plan of care discussed with patient: Yes  Pt's spiritual, cultural and educational needs considered and patient is agreeable to the plan of care and goals as stated below:      Anticipated barriers for therapy: none     Medical Necessity is demonstrated by the following:  History  Co-morbidities and personal factors that may impact the plan of care Co-morbidities:   high BMI and history of cancer     Personal Factors:   Pt reports trying to get pregnant at this time       moderate   Examination  Body Structures and Functions, activity limitations and participation restrictions that may impact the plan of care Body Systems:    ROM  strength  transitions  edema  scar formation     Activity limitations:   Mobility  Cannot get up and down from floor, stairs     Self care  no deficits     Domestic Life  no deficits      Participation Restrictions:   Should pt get pregnant she is to inform us and get clearance from OB that she can participate with lymph tx             moderate   Clinical Presentation evolving clinical presentation with changing clinical characteristics moderate   Decision Making/ Complexity Score: moderate         GOALS  Short Term Goals: 4 weeks   Patient will demonstrate 100% knowledge of lymphedema precautions and signs of infection.   Patient will tolerate iADLs with multilayered bandaging for 24-48 hours.   Decrease girth by average 1 cm for improved mobility and safety with iADLs.  Patient to report compliance sleeping on 30 degree incline for lymphatic protection.   Patient will demonstrate proper posture with sitting and standing to decrease detrimental affects to adjacent structures.   Patient will tolerate HEP for better progression toward LTGs and self-management of presentation.   Improve patellar mobility and teach pt how to perform self patellar mobilization     Long Term Goals: 8 weeks  Patient will be independent with HEP for self-management of symptoms and current status.   Decrease girth by average 2 cm for improved mobility and safety with iADLs.  Patient will perform self lymphatic drainage techniques for long term management of lymphedema.   Patient to don/doff compression garments for self management of lymphedema.   Patient to obtain pneumatic compression pump for daily home use to assist in maintaining reduced swelling.   Patient to demonstrate LE AROM to WFL for improved ability to perform ADLs (self care, dressing, bathing) and iADLs (household chores, yard work) independently.   Pt to be able to get down to floor and up from floor with greater ease.      Plan   Next session, MLD with compression pump, provide ex ham and if she brings in plunger can use to break up scar tissue at patella.     Plan of Care Certification: 5/2/2025 to 7/2/2025     Outpatient Physical Therapy 2 time(s) a week  for 6-8 weeks to include the following interventions: patient education, HEP, therapeutic exercises, neuromuscular re-education, therapeutic activity, manual therapy including pneumatic compression pump and lymphatouch, self care/home management, modalities, gait training, decongestive massage, multi-layered bandaging, self massage, self bandaging, and assistance in obtaining appropriate compression garment.       Colette Alcocer, PT, CLT

## 2025-06-16 RX ORDER — IPRATROPIUM BROMIDE 21 UG/1
SPRAY, METERED NASAL
Qty: 90 ML | Refills: 1 | Status: SHIPPED | OUTPATIENT
Start: 2025-06-16

## 2025-06-18 ENCOUNTER — CLINICAL SUPPORT (OUTPATIENT)
Dept: REHABILITATION | Facility: HOSPITAL | Age: 35
End: 2025-06-18
Payer: COMMERCIAL

## 2025-06-18 DIAGNOSIS — I89.0 LYMPHEDEMA: Primary | ICD-10-CM

## 2025-06-18 PROCEDURE — 97140 MANUAL THERAPY 1/> REGIONS: CPT | Mod: PN

## 2025-06-18 NOTE — PROGRESS NOTES
Physical Therapy Daily Treatment Note/Lymphedema Management     Name: Rosangela Salazar  Clinic Number: [id number]    Therapy Diagnosis:   Lymphedema    Physician: Mattie Castro MD    Visit Date: 6/18/2025    Physician Orders: PT eval and treat  Medical Diagnosis:   Encounter Diagnosis   Name Primary?    Lymphedema Yes     Evaluation Date: 5/2/2025  Authorization Period Expiration: through 12/31/2025  Plan of Care Certification Period: 5/2/2025 to 7/2/2025  Visit #/Visits authorized: 5/ 12 ( 12 auth)   PTA visit #0/5    Time In: 4:05 PM   Time Out: 5:15 PM  Total Billable Time: 70 minutes    Precautions: Standard and bone graft still in healing phase     Subjective     Pt reports got the plunger, have not heard from anyone about the garments. Reports concern about her right medial thigh at groin area being full, just anxious because of the left legs history.   PT sent message via Epic  to her MD about her concern. ( Dr. Castro)          She was compliant with home exercise program.  Response to previous treatment: good  Functional change: none    Pain: 0/10  Location: none    Objective   GARMENTS: Recommendations:   According to what her insurance plan will cover:  Thigh high ready to wear Juzo basic, with silicone sleeve size III/regular 20-30 mmHg  Farrow wrap toe cap, ready to wear size small  Medi Juxtafit premium foot and ankle wrap  Solaris tribute wrap full leg size medium  AR 6/9/2025 sent orders to [employer]  AR re-sent to [employer] 6/18/2025 w message for them to contact me if they received it.     Rosangela received therapeutic exercises to develop strength, endurance, ROM, and flexibility for 5 minutes including:  Hamstring isometrics hold 10 sec repeat 10 reps with foot on top of wedge while in supported supine position.  Provided red and green theraband for home use.   Previously:   provided hand out for lymphatic flow pt to try at home.  Slr, attempted single limb bridge  however pt with knee pain in left knee.   Sidelye hip adduction with verbal cues   Sidelye on R side with ball under lift knee that was flexed then performed hip IR and knee extension  and return x 10 reps  Standing L VMO work with red t band      Rosangela received the following manual therapy techniques 70 min Manual Lymphatic Drainage were applied to the: (deferred Manual lymphatic drainage to left short neck series, cervical terminus , axilla, deep abdominals, sternal nodes, paravertebral nodes, AIA anastomosis , YENI anastomosis , inguinal nodes, popliteal nodes, full leg sequence, and rework accessing all watershed areas on trunk)  Concurrent use of pneumatic compression pump set at 30mmHg for 20 minutes.  PT applied gentle gradient compression bandages to include: stockinet, cast padding, CompriFoam, and short stretch bandages 1 6 cm, 2 8 cm, 1 10 cm from foot to thigh.  Pt instructed to wear wrap for 24-48 hours and removed if she experiences SOB, unusual pain in leg or vascular compromise. Pt with good understanding of instruction.   Educated on plunger technique for patellar mobility / scar tissue/ fascial restrictions, demonstrated plunger use on scar tissue and at left knee with pt practicing.     Deferred secondary not working in clinic Lymphatouch applied to L leg set at 60mmHg for 0 minutes.         Home Exercises Provided and Patient Education Provided     Education provided:   - garments will be measured for if she tolerates bandaging, and we will determine level of compression based on how she tolerates wraps.    Written Home Exercises Provided: yes lymphatic flow.  Exercises were reviewed and Rosangela was able to demonstrate them prior to the end of the session.  Rosangela demonstrated good  understanding of the education provided.     See EMR under Patient Instructions for exercises provided next tx.    Assessment   Awaiting [employer] to reply about her garments, also will benefit from pump at  home for management. She does have some medial thigh swelling in R leg at groin, and contacted MD. This could be because she is wearing compression daily either through her wraps or old garments through the lymphatic anastomosis to opposite side.  Pt will benefit from new 20-30 mmHg thigh high.    Pt received pump trial and compression wrapping for decongestion to left LE, with good tolerance. Tolerated plunger on knee for scar tissue.  Pt agreeable to have a garment with silicone top band, explained to patient the the silicone needs to cleaned daily to prevent build up of skin oils to prevent irritation.    Rosangela Is progressing well towards her goals.   Pt prognosis is Good.     Pt will continue to benefit from skilled outpatient physical therapy to address the deficits listed in the problem list box on initial evaluation, provide pt/family education and to maximize pt's level of independence in the home and community environment.     Pt's spiritual, cultural and educational needs considered and pt agreeable to plan of care and goals.     Anticipated barriers to physical therapy: none  Rosangela is a 35 y.o. female referred to outpatient physical therapy with a medical diagnosis of L LE lymphedema s/p 25 radiation treatments to treat a sarcoma which pt also had surgical removal of sarcoma and tissue. Pt presents with decreased ROM in L knee, decreased patellar mobility, weakness in quad and gluts as well as lymphedema affecting left lower leg and ankle. PT will benefit from wrapping l lower extremity to above knee into L hip and we will assess what level of compression she will need for her garments, currently using 20-30 mmHg but not daily.      Plan of care discussed with patient: Yes  Pt's spiritual, cultural and educational needs considered and patient is agreeable to the plan of care and goals as stated below:      Anticipated barriers for therapy: none     Medical Necessity is demonstrated by the  following:  History  Co-morbidities and personal factors that may impact the plan of care Co-morbidities:   high BMI and history of cancer     Personal Factors:   Pt reports trying to get pregnant at this time       moderate   Examination  Body Structures and Functions, activity limitations and participation restrictions that may impact the plan of care Body Systems:    ROM  strength  transitions  edema  scar formation     Activity limitations:   Mobility  Cannot get up and down from floor, stairs     Self care  no deficits     Domestic Life  no deficits     Participation Restrictions:   Should pt get pregnant she is to inform us and get clearance from OB that she can participate with lymph tx             moderate   Clinical Presentation evolving clinical presentation with changing clinical characteristics moderate   Decision Making/ Complexity Score: moderate         GOALS  Short Term Goals: 4 weeks   Patient will demonstrate 100% knowledge of lymphedema precautions and signs of infection. GOAL MET 6/16/2025  Patient will tolerate iADLs with multilayered bandaging for 24-48 hours. GOAL MET 6/16/2025  Decrease girth by average 1 cm for improved mobility and safety with iADLs.  Patient to report compliance sleeping on 30 degree incline for lymphatic protection. GOAL MET 6/16/2025  Patient will demonstrate proper posture with sitting and standing to decrease detrimental affects to adjacent structures. GOAL MET 6/16/2025  Patient will tolerate HEP for better progression toward LTGs and self-management of presentation. GOAL MET 6/16/2025  Improve patellar mobility and teach pt how to perform self patellar mobilizationGOAL MET 6/16/2025     Long Term Goals: 8 weeks  Patient will be independent with HEP for self-management of symptoms and current status.   Decrease girth by average 2 cm for improved mobility and safety with iADLs.  Patient will perform self lymphatic drainage techniques for long term management of  lymphedema.   Patient to don/doff compression garments for self management of lymphedema.   Patient to obtain pneumatic compression pump for daily home use to assist in maintaining reduced swelling.   Patient to demonstrate LE AROM to WFL for improved ability to perform ADLs (self care, dressing, bathing) and iADLs (household chores, yard work) independently.   Pt to be able to get down to floor and up from floor with greater ease.      Plan   Assess patellar mobility and girth measures next tx; send off for pump.    Plan of Care Certification: 5/2/2025 to 7/2/2025     Outpatient Physical Therapy 2 time(s) a week for 6-8 weeks to include the following interventions: patient education, HEP, therapeutic exercises, neuromuscular re-education, therapeutic activity, manual therapy including pneumatic compression pump and lymphatouch, self care/home management, modalities, gait training, decongestive massage, multi-layered bandaging, self massage, self bandaging, and assistance in obtaining appropriate compression garment.       Colette Alcocer, PT, CLT

## 2025-06-25 ENCOUNTER — CLINICAL SUPPORT (OUTPATIENT)
Dept: REHABILITATION | Facility: HOSPITAL | Age: 35
End: 2025-06-25
Payer: COMMERCIAL

## 2025-06-25 DIAGNOSIS — I89.0 LYMPHEDEMA: Primary | ICD-10-CM

## 2025-06-25 PROCEDURE — 97140 MANUAL THERAPY 1/> REGIONS: CPT | Mod: PN

## 2025-06-25 NOTE — PROGRESS NOTES
Physical Therapy Daily Treatment Note/Lymphedema Management / Re-assessment     Name: Rosangela Salazar  Clinic Number: [id number]    Therapy Diagnosis:   Lymphedema    Physician: Mattie Castro MD    Visit Date: 6/25/2025    Physician Orders: PT eval and treat  Medical Diagnosis:   Encounter Diagnosis   Name Primary?    Lymphedema Yes     Evaluation Date: 5/2/2025  Re-assessment 6/25/2025  Authorization Period Expiration: through 12/31/2025  Plan of Care Certification Period: 5/2/2025 to 7/2/2025  Visit #/Visits authorized: 6/ 12 ( 12 auth)   PTA visit #0/5    Time In: 4:00 PM   Time Out: 5:15 PM  Total Billable Time: 70 minutes    Precautions: Standard and bone graft still in healing phase     Subjective     Pt reports had questions regarding use of plunger for scar tissue, however did not bring in plunger, will bring in plunger next tx. Compliant wearing her L LE thigh high compression garment.     She was compliant with home exercise program.  Response to previous treatment: good  Functional change: none    Pain: 0/10  Location: none    Objective   GARMENT: Recommendations:   According to what her insurance plan will cover:  Thigh high ready to wear Juzo basic, with silicone sleeve size III/regular 20-30 mmHg  Farrow wrap toe cap, ready to wear size small  Medi Juxtafit premium foot and ankle wrap  Solaris tribute wrap full leg size medium  AR 6/9/2025 sent orders to [employer] today  AR re-sent to [employer] 6/18/2025 w message for them to contact me if they received it.   AR contacted [employer] to see what status was on garment orders  Measurements  LANDMARK LEFT LE (cm) Left LE Re-Assess RIGHT LE (cm)   H + 25 in 58.4 57.1 58.1   H + 21 in  44.8 43.4 46.8   Knee 41.2 41.3 43.6            H + 16 in 40.9 42.0 39.0   H + 14 in 42.6 43.2 41.6   H + 10 in 36.7 36.4 37.9   H + 3.5 in ankle 25.9 27.0 25.5   12  cm from great toe 24.4 23.9 24.1   MTP Jt line 24.0 23.8 24.0   Leg Length Not taken       Garments recommended: currently has been wearing 20-30mmHg not daily, however will need a thigh high and a night time garment for Lfoot ankle and lower leg above knee.  deferred  Rosangela received therapeutic exercises to develop strength, endurance, ROM, and flexibility for 0 minutes including:  Hamstring isometrics hold 10 sec repeat 10 reps with foot on top of wedge while in supported supine position.  Provided red and green theraband for home use.   Previously:   provided hand out for lymphatic flow pt to try at home.  Slr, attempted single limb bridge however pt with knee pain in left knee.   Sidelye hip adduction with verbal cues   Sidelye on R side with ball under lift knee that was flexed then performed hip IR and knee extension  and return x 10 reps  Standing L VMO work with red t band      Rosangela received the following manual therapy techniques 70 min Manual Lymphatic Drainage were applied to the: (deferred Manual lymphatic drainage to left short neck series, cervical terminus , axilla, deep abdominals, sternal nodes, paravertebral nodes, AIA anastomosis , YENI anastomosis , inguinal nodes, popliteal nodes, full leg sequence, and rework accessing all watershed areas on trunk)  Concurrent use of pneumatic compression pump set at 30mmHg for 20 minutes.  PT applied gentle gradient compression bandages to include: stockinet, cast padding, CompriFoam, and short stretch bandages 1 6 cm, 2 8 cm, 1 10 cm from foot to thigh.  Pt instructed to wear wrap for 24-48 hours and removed if she experiences SOB, unusual pain in leg or vascular compromise. Pt with good understanding of instruction.   Measurements taken today for re-assess.  deferred  Educated on plunger technique for patellar mobility / scar tissue/ fascial restrictions, demonstrated plunger use on scar tissue and at left knee with pt practicing.     Deferred secondary not working in clinic Lymphatouch applied to L leg set at 60mmHg for 0 minutes.          Home Exercises Provided and Patient Education Provided     Education provided:   - garments will be measured for if she tolerates bandaging, and we will determine level of compression based on how she tolerates wraps.    Written Home Exercises Provided: yes lymphatic flow.  Exercises were reviewed and Rosangela was able to demonstrate them prior to the end of the session.  Rosangela demonstrated good  understanding of the education provided.     See EMR under Patient Instructions for exercises provided next tx.    Assessment   Pt demonstrates some mild gains in her girth measurements, despite being compliant with wearing her compression garments. Patient has not seen significant improvement after four plus weeks of conservative therapy to include compression, elevation, exercise, MLD, skin care and diet as well as compliance to medication regimen. Pt left leg with hyperplasia present and needs a compression device for home management.   She does have some medial thigh swelling in R leg at groin. This could be because she is wearing compression daily either through her wraps or old garments through the lymphatic anastomosis to opposite side.  Pt will benefit from new 20-30 mmHg thigh high.    Pt received pump trial and compression wrapping for decongestion to left LE, with good tolerance.   Pt agreeable to have a garment with silicone top band, explained to patient the the silicone needs to cleaned daily to prevent build up of skin oils to prevent irritation.    Rosangela Is progressing well towards her goals.  May benefit from bioflect leggings.   Pt prognosis is Good.     Pt will continue to benefit from skilled outpatient physical therapy to address the deficits listed in the problem list box on initial evaluation, provide pt/family education and to maximize pt's level of independence in the home and community environment.     Pt's spiritual, cultural and educational needs considered and pt agreeable to plan of care and  goals.     Anticipated barriers to physical therapy: none  Rosangela is a 35 y.o. female referred to outpatient physical therapy with a medical diagnosis of L LE lymphedema s/p 25 radiation treatments to treat a sarcoma which pt also had surgical removal of sarcoma and tissue. Pt presents with decreased ROM in L knee, decreased patellar mobility, weakness in quad and gluts as well as lymphedema affecting left lower leg and ankle. PT will benefit from wrapping l lower extremity to above knee into L hip and we will assess what level of compression she will need for her garments, currently using 20-30 mmHg but not daily.      Plan of care discussed with patient: Yes  Pt's spiritual, cultural and educational needs considered and patient is agreeable to the plan of care and goals as stated below:      Anticipated barriers for therapy: none     Medical Necessity is demonstrated by the following:  History  Co-morbidities and personal factors that may impact the plan of care Co-morbidities:   high BMI and history of cancer     Personal Factors:   Pt reports trying to get pregnant at this time       moderate   Examination  Body Structures and Functions, activity limitations and participation restrictions that may impact the plan of care Body Systems:    ROM  strength  transitions  edema  scar formation     Activity limitations:   Mobility  Cannot get up and down from floor, stairs     Self care  no deficits     Domestic Life  no deficits     Participation Restrictions:   Should pt get pregnant she is to inform us and get clearance from OB that she can participate with lymph tx             moderate   Clinical Presentation evolving clinical presentation with changing clinical characteristics moderate   Decision Making/ Complexity Score: moderate         GOALS  Short Term Goals: 4 weeks   Patient will demonstrate 100% knowledge of lymphedema precautions and signs of infection. GOAL MET 6/16/2025  Patient will tolerate iADLs with  multilayered bandaging for 24-48 hours. GOAL MET 6/16/2025  Decrease girth by average 1 cm for improved mobility and safety with iADLs.  Patient to report compliance sleeping on 30 degree incline for lymphatic protection. GOAL MET 6/16/2025  Patient will demonstrate proper posture with sitting and standing to decrease detrimental affects to adjacent structures. GOAL MET 6/16/2025  Patient will tolerate HEP for better progression toward LTGs and self-management of presentation. GOAL MET 6/16/2025  Improve patellar mobility and teach pt how to perform self patellar mobilizationGOAL MET 6/16/2025     Long Term Goals: 8 weeks in progress 6/25/2025  Patient will be independent with HEP for self-management of symptoms and current status.   Decrease girth by average 2 cm for improved mobility and safety with iADLs.  Patient will perform self lymphatic drainage techniques for long term management of lymphedema.   Patient to don/doff compression garments for self management of lymphedema.   Patient to obtain pneumatic compression pump for daily home use to assist in maintaining reduced swelling.   Patient to demonstrate LE AROM to WFL for improved ability to perform ADLs (self care, dressing, bathing) and iADLs (household chores, yard work) independently.   Pt to be able to get down to floor and up from floor with greater ease.      Plan   Assess L patellar mobility, provide written info on bioflect leggings    Plan of Care Certification: 5/2/2025 to 7/2/2025     Outpatient Physical Therapy 2 time(s) a week for 6-8 weeks to include the following interventions: patient education, HEP, therapeutic exercises, neuromuscular re-education, therapeutic activity, manual therapy including pneumatic compression pump and lymphatouch, self care/home management, modalities, gait training, decongestive massage, multi-layered bandaging, self massage, self bandaging, and assistance in obtaining appropriate compression garment.       Colette  NAKIA Alcocer, PT, CLT

## 2025-06-30 ENCOUNTER — CLINICAL SUPPORT (OUTPATIENT)
Dept: REHABILITATION | Facility: HOSPITAL | Age: 35
End: 2025-06-30
Payer: COMMERCIAL

## 2025-06-30 DIAGNOSIS — I89.0 LYMPHEDEMA: Primary | ICD-10-CM

## 2025-06-30 PROCEDURE — 97140 MANUAL THERAPY 1/> REGIONS: CPT | Mod: PN

## 2025-06-30 NOTE — PROGRESS NOTES
Physical Therapy Daily Treatment Note/Lymphedema Management      Name: Rosangela Salazar  Clinic Number: [id number]    Therapy Diagnosis:   Lymphedema    Physician: Mattie Castro MD    Visit Date: 6/30/2025    Physician Orders: PT eval and treat  Medical Diagnosis:   Encounter Diagnosis   Name Primary?    Lymphedema Yes     Evaluation Date: 5/2/2025  Re-assessment 6/25/2025  Authorization Period Expiration: through 12/31/2025  Plan of Care Certification Period: 5/2/2025 to 7/2/2025  Visit #/Visits authorized: 7/ 12 ( 12 auth)   PTA visit #0/5    Time In: 4:00 PM   Time Out: 5:10 PM  Total Billable Time: 60 minutes    Precautions: Standard and bone graft still in healing phase     Subjective     Pt reports have not heard from pump or garments  people  Compliant wearing her L LE thigh high compression garment. Pt requested not to wrap, since she has a job in [place], and will wear her compression garment.     She was compliant with home exercise program.  Response to previous treatment: good  Functional change: none    Pain: 0/10  Location: none  Vitals 99% and 78 HR  Objective   GARMENT: Recommendations:   According to what her insurance plan will cover:  Thigh high ready to wear Juzo basic, with silicone sleeve size III/regular 20-30 mmHg  Farrow wrap toe cap, ready to wear size small  Medi Juxtafit premium foot and ankle wrap  Solaris tribute wrap full leg size medium  AR 6/9/2025 sent orders to [employer] today  AR re-sent to [employer] 6/18/2025 w message for them to contact me if they received it.   AR contacted [employer] to see what status was on garment orders  AR contacted [employer] to see status 6/30/25  PUMP: AR contacted Zane with [employer], they are still checking insurance for pump  Garments recommended: currently has been wearing 20-30mmHg not daily, however will need a thigh high and a night time garment for Lfoot ankle and lower leg above knee.  deferred  Rosangela  received therapeutic exercises to develop strength, endurance, ROM, and flexibility for 0 minutes including:  Hamstring isometrics hold 10 sec repeat 10 reps with foot on top of wedge while in supported supine position.  Provided red and green theraband for home use.   Previously:   provided hand out for lymphatic flow pt to try at home.  Slr, attempted single limb bridge however pt with knee pain in left knee.   Sidelye hip adduction with verbal cues   Sidelye on R side with ball under lift knee that was flexed then performed hip IR and knee extension  and return x 10 reps  Standing L VMO work with red t band      Rosangela received the following manual therapy techniques 65 min Manual Lymphatic Drainage were applied to the: (deferred Manual lymphatic drainage to left short neck series, cervical terminus , axilla, deep abdominals, sternal nodes, paravertebral nodes, AIA anastomosis , YENI anastomosis , inguinal nodes, popliteal nodes, full leg sequence, and rework accessing all watershed areas on trunk)  Concurrent use of pneumatic compression pump set at 30mmHg for 20 minutes.  Review use of plunger with patient with demonstration and assessed patellar mobility which appears to be improved since mykel.    Deferred PT applied gentle gradient compression bandages to include: stockinet, cast padding, CompriFoam, and short stretch bandages 1 6 cm, 2 8 cm, 1 10 cm from foot to thigh.  Pt instructed to wear wrap for 24-48 hours and removed if she experiences SOB, unusual pain in leg or vascular compromise. Pt with good understanding of instruction.   Measurements taken today for re-assess.  deferred  Educated on plunger technique for patellar mobility / scar tissue/ fascial restrictions, demonstrated plunger use on scar tissue and at left knee with pt practicing.     Deferred secondary not working in clinic Lymphatouch applied to L leg set at 60mmHg for 0 minutes.         Home Exercises Provided and Patient Education Provided      Education provided:   - garments will be measured for if she tolerates bandaging, and we will determine level of compression based on how she tolerates wraps.    Written Home Exercises Provided: yes lymphatic flow.  Exercises were reviewed and Rosangela was able to demonstrate them prior to the end of the session.  Rosangela demonstrated good  understanding of the education provided.     See EMR under Patient Instructions for exercises provided next tx.    Assessment   Pt with improved range of motion in left knee for flexion at 111 degrees as compared to eval as well as improved patellar mobility.  Patient has not seen significant improvement after four plus weeks of conservative therapy to include compression, elevation, exercise, MLD, skin care and diet as well as compliance to medication regimen. Pt left leg with hyperplasia present and needs a compression device for home management.   She does have some medial thigh swelling in R leg at groin. This could be because she is wearing compression daily either through her wraps or old garments through the lymphatic anastomosis to opposite side.  Pt will benefit from new 20-30 mmHg thigh high.    Pt received pump trial and compression wrapping for decongestion to left LE, with good tolerance.   Pt agreeable to have a garment with silicone top band, explained to patient the the silicone needs to cleaned daily to prevent build up of skin oils to prevent irritation.    Rosangela Is progressing well towards her goals.  May benefit from bioflect leggings.   Pt prognosis is Good.     Pt will continue to benefit from skilled outpatient physical therapy to address the deficits listed in the problem list box on initial evaluation, provide pt/family education and to maximize pt's level of independence in the home and community environment.     Pt's spiritual, cultural and educational needs considered and pt agreeable to plan of care and goals.     Anticipated barriers to physical  therapy: none  Rosangela is a 35 y.o. female referred to outpatient physical therapy with a medical diagnosis of L LE lymphedema s/p 25 radiation treatments to treat a sarcoma which pt also had surgical removal of sarcoma and tissue. Pt presents with decreased ROM in L knee, decreased patellar mobility, weakness in quad and gluts as well as lymphedema affecting left lower leg and ankle. PT will benefit from wrapping l lower extremity to above knee into L hip and we will assess what level of compression she will need for her garments, currently using 20-30 mmHg but not daily.      Plan of care discussed with patient: Yes  Pt's spiritual, cultural and educational needs considered and patient is agreeable to the plan of care and goals as stated below:      Anticipated barriers for therapy: none     Medical Necessity is demonstrated by the following:  History  Co-morbidities and personal factors that may impact the plan of care Co-morbidities:   high BMI and history of cancer     Personal Factors:   Pt reports trying to get pregnant at this time       moderate   Examination  Body Structures and Functions, activity limitations and participation restrictions that may impact the plan of care Body Systems:    ROM  strength  transitions  edema  scar formation     Activity limitations:   Mobility  Cannot get up and down from floor, stairs     Self care  no deficits     Domestic Life  no deficits     Participation Restrictions:   Should pt get pregnant she is to inform us and get clearance from OB that she can participate with lymph tx             moderate   Clinical Presentation evolving clinical presentation with changing clinical characteristics moderate   Decision Making/ Complexity Score: moderate         GOALS  Short Term Goals: 4 weeks   Patient will demonstrate 100% knowledge of lymphedema precautions and signs of infection. GOAL MET 6/16/2025  Patient will tolerate iADLs with multilayered bandaging for 24-48 hours. GOAL  MET 6/16/2025  Decrease girth by average 1 cm for improved mobility and safety with iADLs.  Patient to report compliance sleeping on 30 degree incline for lymphatic protection. GOAL MET 6/16/2025  Patient will demonstrate proper posture with sitting and standing to decrease detrimental affects to adjacent structures. GOAL MET 6/16/2025  Patient will tolerate HEP for better progression toward LTGs and self-management of presentation. GOAL MET 6/16/2025  Improve patellar mobility and teach pt how to perform self patellar mobilizationGOAL MET 6/16/2025     Long Term Goals: 8 weeks in progress 6/25/2025  Patient will be independent with HEP for self-management of symptoms and current status.   Decrease girth by average 2 cm for improved mobility and safety with iADLs.  Patient will perform self lymphatic drainage techniques for long term management of lymphedema.   Patient to don/doff compression garments for self management of lymphedema.   Patient to obtain pneumatic compression pump for daily home use to assist in maintaining reduced swelling.   Patient to demonstrate LE AROM to WFL for improved ability to perform ADLs (self care, dressing, bathing) and iADLs (household chores, yard work) independently.   Pt to be able to get down to floor and up from floor with greater ease.      Plan   Assess L patellar mobility, provide written info on bioflect leggings    Plan of Care Certification: 5/2/2025 to 7/2/2025     Outpatient Physical Therapy 2 time(s) a week for 6-8 weeks to include the following interventions: patient education, HEP, therapeutic exercises, neuromuscular re-education, therapeutic activity, manual therapy including pneumatic compression pump and lymphatouch, self care/home management, modalities, gait training, decongestive massage, multi-layered bandaging, self massage, self bandaging, and assistance in obtaining appropriate compression garment.       Colette Alcocer, PT, CLT

## 2025-07-07 ENCOUNTER — TELEPHONE (OUTPATIENT)
Dept: HEMATOLOGY/ONCOLOGY | Facility: CLINIC | Age: 35
End: 2025-07-07
Payer: COMMERCIAL

## 2025-07-07 NOTE — TELEPHONE ENCOUNTER
Spoke with pt to move her appt on Wed 7/9/25 to Friday 7/11/25 due to her need to see PT/lymph therapist. Also, scheduled patient for another appt for the following week. Pt verbalized understanding, confirmed appt day/time and I apologized for the inconvenience and thanked the pt for her understanding.

## 2025-07-11 ENCOUNTER — CLINICAL SUPPORT (OUTPATIENT)
Dept: REHABILITATION | Facility: HOSPITAL | Age: 35
End: 2025-07-11
Payer: COMMERCIAL

## 2025-07-11 DIAGNOSIS — I89.0 LYMPHEDEMA: Primary | ICD-10-CM

## 2025-07-11 PROCEDURE — 97140 MANUAL THERAPY 1/> REGIONS: CPT | Mod: PN,CQ

## 2025-07-11 NOTE — PROGRESS NOTES
Physical Therapy Daily Treatment Note/Lymphedema Management      Name: Rosangela Salazar  Clinic Number: [id number]    Therapy Diagnosis:   Lymphedema    Physician: Mattie Castro MD    Visit Date: 7/11/2025    Physician Orders: PT eval and treat  Medical Diagnosis:   Encounter Diagnosis   Name Primary?    Lymphedema Yes     Evaluation Date: 5/2/2025  Re-assessment 6/25/2025  Authorization Period Expiration: through 12/31/2025  Plan of Care Certification Period: 5/2/2025 to 7/2/2025  Visit #/Visits authorized: 8/ 12 ( 12 auth)   PTA visit #1/5    Time In: 4:00 PM   Time Out: 5:10 PM  Total Billable Time: 60 minutes    Precautions: Standard and bone graft still in healing phase     Subjective     Pt reports:  Have not heard anything about her garments, but have been looking at the [employer] on [employer] and plan to order.  Compliant wearing her L LE thigh high compression garment. Having a pump trial next week on Thursday. Was using the plunger too much and having some pain, have backed off a little.     She was compliant with home exercise program.  Response to previous treatment: good  Functional change: none    Pain: 0/10  Location: none  Vitals 99% and 78 HR  Objective   GARMENT: Recommendations:   According to what her insurance plan will cover:  Thigh high ready to wear Juzo basic, with silicone sleeve size III/regular 20-30 mmHg  Farrow wrap toe cap, ready to wear size small  Medi Juxtafit premium foot and ankle wrap  Solaris tribute wrap full leg size medium  AR 6/9/2025 sent orders to [employer] today  AR re-sent to [employer] today 6/18/2025 w message for them to contact me if they received it.   AR contacted [employer] to see what status was on garment orders  AR contacted [employer] to see status 6/30/25  SD gave Rosangela info sheet that [employer] faxed (in media) with breakdown of cost of garments. Pt to call next week.   PUMP: AR contacted Zane with [employer], they are still checking insurance for  pump  Pump trial on Thursday of next week. SD  Garments recommended: currently has been wearing 20-30mmHg not daily, however will need a thigh high and a night time garment for Lfoot ankle and lower leg above knee.  deferred  Rosangela received therapeutic exercises to develop strength, endurance, ROM, and flexibility for 0 minutes including:  Hamstring isometrics hold 10 sec repeat 10 reps with foot on top of wedge while in supported supine position.  Provided red and green theraband for home use.   Previously:   provided hand out for lymphatic flow pt to try at home.  Slr, attempted single limb bridge however pt with knee pain in left knee.   Sidelye hip adduction with verbal cues   Sidelye on R side with ball under lift knee that was flexed then performed hip IR and knee extension  and return x 10 reps  Standing L VMO work with red t band      Rosangela received the following manual therapy techniques 65 min Manual Lymphatic Drainage were applied to the: (Manual lymphatic drainage to left short neck series, cervical terminus , axilla, deep abdominals, sternal nodes, paravertebral nodes, AIA anastomosis , YENI anastomosis , inguinal nodes, popliteal nodes, full leg sequence, and rework accessing all watershed areas on trunk)  Concurrent use of pneumatic compression pump set at 30mmHg for 20 minutes.  Review use of plunger with patient with demonstration and assessed patellar mobility which appears to be improved since mykel.    Applied gentle gradient compression bandages to include: stockinet, cast padding, CompriFoam, and short stretch bandages 1 6 cm, 2 8 cm, 1 10 cm from foot to thigh.  Pt instructed to wear wrap for 24-48 hours and removed if she experiences SOB, unusual pain in leg or vascular compromise. Pt with good understanding of instruction.   Measurements taken today for re-assess.  deferred  Educated on plunger technique for patellar mobility / scar tissue/ fascial restrictions, demonstrated plunger use on scar  tissue and at left knee with pt practicing.     Deferred secondary not working in clinic Lymphatouch applied to L leg set at 60mmHg for 0 minutes.         Home Exercises Provided and Patient Education Provided     Education provided:   - garments will be measured for if she tolerates bandaging, and we will determine level of compression based on how she tolerates wraps.    Written Home Exercises Provided: yes lymphatic flow.  Exercises were reviewed and Rosangela was able to demonstrate them prior to the end of the session.  Rosangela demonstrated good  understanding of the education provided.     See EMR under Patient Instructions for exercises provided next tx.    Assessment   Pt with improved range of motion in left knee for flexion at 115 degrees as compared to eval as well as improved patellar mobility.  Patient has not seen significant improvement after four plus weeks of conservative therapy to include compression, elevation, exercise, MLD, skin care and diet as well as compliance to medication regimen. Pt left leg with hyperplasia present and needs a compression device for home management.   She does have some medial thigh swelling in R leg at groin. This could be because she is wearing compression daily either through her wraps or old garments through the lymphatic anastomosis to opposite side.  Pt will benefit from new 20-30 mmHg thigh high.    Pt received pump trial and compression wrapping for decongestion to left LE, with good tolerance.   Pt agreeable to have a garment with silicone top band, explained to patient the the silicone needs to cleaned daily to prevent build up of skin oils to prevent irritation.    Rosangela Is progressing well towards her goals.  May benefit from bioflect leggings, went on [employer] to order  Pt prognosis is Good.     Pt will continue to benefit from skilled outpatient physical therapy to address the deficits listed in the problem list box on initial evaluation, provide pt/family  education and to maximize pt's level of independence in the home and community environment.     Pt's spiritual, cultural and educational needs considered and pt agreeable to plan of care and goals.     Anticipated barriers to physical therapy: none  Rosangela is a 35 y.o. female referred to outpatient physical therapy with a medical diagnosis of L LE lymphedema s/p 25 radiation treatments to treat a sarcoma which pt also had surgical removal of sarcoma and tissue. Pt presents with decreased ROM in L knee, decreased patellar mobility, weakness in quad and gluts as well as lymphedema affecting left lower leg and ankle. PT will benefit from wrapping l lower extremity to above knee into L hip and we will assess what level of compression she will need for her garments, currently using 20-30 mmHg but not daily.      Plan of care discussed with patient: Yes  Pt's spiritual, cultural and educational needs considered and patient is agreeable to the plan of care and goals as stated below:      Anticipated barriers for therapy: none     Medical Necessity is demonstrated by the following:  History  Co-morbidities and personal factors that may impact the plan of care Co-morbidities:   high BMI and history of cancer     Personal Factors:   Pt reports trying to get pregnant at this time       moderate   Examination  Body Structures and Functions, activity limitations and participation restrictions that may impact the plan of care Body Systems:    ROM  strength  transitions  edema  scar formation     Activity limitations:   Mobility  Cannot get up and down from floor, stairs     Self care  no deficits     Domestic Life  no deficits     Participation Restrictions:   Should pt get pregnant she is to inform us and get clearance from OB that she can participate with lymph tx             moderate   Clinical Presentation evolving clinical presentation with changing clinical characteristics moderate   Decision Making/ Complexity Score:  moderate         GOALS  Short Term Goals: 4 weeks   Patient will demonstrate 100% knowledge of lymphedema precautions and signs of infection. GOAL MET 6/16/2025  Patient will tolerate iADLs with multilayered bandaging for 24-48 hours. GOAL MET 6/16/2025  Decrease girth by average 1 cm for improved mobility and safety with iADLs.  Patient to report compliance sleeping on 30 degree incline for lymphatic protection. GOAL MET 6/16/2025  Patient will demonstrate proper posture with sitting and standing to decrease detrimental affects to adjacent structures. GOAL MET 6/16/2025  Patient will tolerate HEP for better progression toward LTGs and self-management of presentation. GOAL MET 6/16/2025  Improve patellar mobility and teach pt how to perform self patellar mobilizationGOAL MET 6/16/2025     Long Term Goals: 8 weeks in progress 6/25/2025  Patient will be independent with HEP for self-management of symptoms and current status.   Decrease girth by average 2 cm for improved mobility and safety with iADLs.  Patient will perform self lymphatic drainage techniques for long term management of lymphedema.   Patient to don/doff compression garments for self management of lymphedema.   Patient to obtain pneumatic compression pump for daily home use to assist in maintaining reduced swelling.   Patient to demonstrate LE AROM to WFL for improved ability to perform ADLs (self care, dressing, bathing) and iADLs (household chores, yard work) independently.   Pt to be able to get down to floor and up from floor with greater ease.      Plan   Assess L patellar mobility, provide written info on bioflect leggings    Plan of Care Certification: 5/2/2025 to 7/2/2025     Outpatient Physical Therapy 2 time(s) a week for 6-8 weeks to include the following interventions: patient education, HEP, therapeutic exercises, neuromuscular re-education, therapeutic activity, manual therapy including pneumatic compression pump and lymphatouch, self  care/home management, modalities, gait training, decongestive massage, multi-layered bandaging, self massage, self bandaging, and assistance in obtaining appropriate compression garment.       Araseli Anna, PTA, CLT

## 2025-07-16 ENCOUNTER — CLINICAL SUPPORT (OUTPATIENT)
Dept: REHABILITATION | Facility: HOSPITAL | Age: 35
End: 2025-07-16
Payer: COMMERCIAL

## 2025-07-16 DIAGNOSIS — I89.0 LYMPHEDEMA: Primary | ICD-10-CM

## 2025-07-16 PROCEDURE — 97140 MANUAL THERAPY 1/> REGIONS: CPT | Mod: PN

## 2025-07-16 NOTE — PROGRESS NOTES
Physical Therapy Daily Treatment Note/Lymphedema Management      Name: Rosangela Salazar  Clinic Number: [id number]    Therapy Diagnosis:   Lymphedema    Physician: Mattie Castro MD    Visit Date: 7/16/2025    Physician Orders: PT eval and treat  Medical Diagnosis:   Encounter Diagnosis   Name Primary?    Lymphedema Yes     Evaluation Date: 5/2/2025  Re-assessment 6/25/2025  Authorization Period Expiration: through 12/31/2025  Plan of Care Certification Period: 5/2/2025 to 7/2/2025  Visit #/Visits authorized: 10/ 12 ( 12 auth)   PTA visit #0/5    Time In: 4:00 PM   Time Out: 5:10 PM  Total Billable Time: 60 minutes    Precautions: Standard and bone graft still in healing phase     Subjective     Pt reports:  pt concerned about cost of garments, PT reviewed with pt, recommended she called [employer] and ask them about the night time garment that would be covered at a better price. All other prices on garments look appropriate and advised to purchase.    Compliant wearing her L LE thigh high compression garment. Having a pump trial next week on Thursday.   She was compliant with home exercise program.  Response to previous treatment: good  Functional change: none    Pain: 0/10  Location: none  Vitals 99% and 78 HR  Objective   GARMENT: Recommendations:   According to what her insurance plan will cover:  Thigh high ready to wear Juzo basic, with silicone sleeve size III/regular 20-30 mmHg  Farrow wrap toe cap, ready to wear size small  Medi Juxtafit premium foot and ankle wrap  Solaris tribute wrap full leg size medium  AR 6/9/2025 sent orders to [employer] today  AR re-sent to [employer] today 6/18/2025 w message for them to contact me if they received it.   AR contacted [employer] to see what status was on garment orders  AR contacted [employer] to see status 6/30/25  SD gave Rosangela info sheet that [employer] faxed (in media) with breakdown of cost of garments. Pt to call next week.   PUMP: AR contacted  Zane with [employer], they are still checking insurance for pump  Pump trial on Thursday of next week. SD  Garments recommended: currently has been wearing 20-30mmHg not daily, however will need a thigh high and a night time garment for Lfoot ankle and lower leg above knee.  deferred  Rosangela received therapeutic exercises to develop strength, endurance, ROM, and flexibility for 0 minutes including:  Hamstring isometrics hold 10 sec repeat 10 reps with foot on top of wedge while in supported supine position.  Provided red and green theraband for home use.   Previously:   provided hand out for lymphatic flow pt to try at home.  Slr, attempted single limb bridge however pt with knee pain in left knee.   Sidelye hip adduction with verbal cues   Sidelye on R side with ball under lift knee that was flexed then performed hip IR and knee extension  and return x 10 reps  Standing L VMO work with red t band      Rosangela received the following manual therapy techniques 65 min Manual Lymphatic Drainage were applied to the: (Manual lymphatic drainage to left short neck series, cervical terminus , axilla, deep abdominals, sternal nodes, paravertebral nodes, AIA anastomosis , YENI anastomosis , inguinal nodes, popliteal nodes, full leg sequence, and rework accessing all watershed areas on trunk)  Concurrent use of pneumatic compression pump set at 30mmHg for 20 minutes.  Review use of plunger with patient with demonstration and assessed patellar mobility which appears to be improved since mykel.    Applied gentle gradient compression bandages to include: stockinet, cast padding, CompriFoam, and short stretch bandages 1 6 cm, 2 8 cm, 1 10 cm from foot to thigh.  Pt instructed to wear wrap for 24-48 hours and removed if she experiences SOB, unusual pain in leg or vascular compromise. Pt with good understanding of instruction.     deferred  Educated on plunger technique for patellar mobility / scar tissue/ fascial restrictions, demonstrated  plunger use on scar tissue and at left knee with pt practicing.     Deferred secondary not working in clinic Lymphatouch applied to L leg set at 60mmHg for 0 minutes.         Home Exercises Provided and Patient Education Provided     Education provided:   - garments will be measured for if she tolerates bandaging, and we will determine level of compression based on how she tolerates wraps.    Written Home Exercises Provided: yes lymphatic flow.  Exercises were reviewed and Rosangela was able to demonstrate them prior to the end of the session.  Rosangela demonstrated good  understanding of the education provided.     See EMR under Patient Instructions for exercises provided next tx.    Assessment   Pt with improved range of motion in left knee for flexion at 115 degrees as compared to eval as well as improved patellar mobility.  Patient has not seen significant improvement after four plus weeks of conservative therapy to include compression, elevation, exercise, MLD, skin care and diet as well as compliance to medication regimen. Pt left leg with hyperplasia present and needs a compression device for home management.   She does have some medial thigh swelling in R leg at groin. This could be because she is wearing compression daily either through her wraps or old garments through the lymphatic anastomosis to opposite side.  Pt will benefit from new 20-30 mmHg thigh high.    Pt received pump trial and compression wrapping for decongestion to left LE, with good tolerance.   Pt agreeable to have a garment with silicone top band, explained to patient the the silicone needs to cleaned daily to prevent build up of skin oils to prevent irritation.    Rosangela Is progressing well towards her goals.  May benefit from bioflect leggings, went on [employer] to order  Pt prognosis is Good.     Pt will continue to benefit from skilled outpatient physical therapy to address the deficits listed in the problem list box on initial evaluation,  provide pt/family education and to maximize pt's level of independence in the home and community environment.     Pt's spiritual, cultural and educational needs considered and pt agreeable to plan of care and goals.     Anticipated barriers to physical therapy: none  Rosangela is a 35 y.o. female referred to outpatient physical therapy with a medical diagnosis of L LE lymphedema s/p 25 radiation treatments to treat a sarcoma which pt also had surgical removal of sarcoma and tissue. Pt presents with decreased ROM in L knee, decreased patellar mobility, weakness in quad and gluts as well as lymphedema affecting left lower leg and ankle. PT will benefit from wrapping l lower extremity to above knee into L hip and we will assess what level of compression she will need for her garments, currently using 20-30 mmHg but not daily.      Plan of care discussed with patient: Yes  Pt's spiritual, cultural and educational needs considered and patient is agreeable to the plan of care and goals as stated below:      Anticipated barriers for therapy: none     Medical Necessity is demonstrated by the following:  History  Co-morbidities and personal factors that may impact the plan of care Co-morbidities:   high BMI and history of cancer     Personal Factors:   Pt reports trying to get pregnant at this time       moderate   Examination  Body Structures and Functions, activity limitations and participation restrictions that may impact the plan of care Body Systems:    ROM  strength  transitions  edema  scar formation     Activity limitations:   Mobility  Cannot get up and down from floor, stairs     Self care  no deficits     Domestic Life  no deficits     Participation Restrictions:   Should pt get pregnant she is to inform us and get clearance from OB that she can participate with lymph tx             moderate   Clinical Presentation evolving clinical presentation with changing clinical characteristics moderate   Decision Making/  Complexity Score: moderate         GOALS  Short Term Goals: 4 weeks   Patient will demonstrate 100% knowledge of lymphedema precautions and signs of infection. GOAL MET 6/16/2025  Patient will tolerate iADLs with multilayered bandaging for 24-48 hours. GOAL MET 6/16/2025  Decrease girth by average 1 cm for improved mobility and safety with iADLs.  Patient to report compliance sleeping on 30 degree incline for lymphatic protection. GOAL MET 6/16/2025  Patient will demonstrate proper posture with sitting and standing to decrease detrimental affects to adjacent structures. GOAL MET 6/16/2025  Patient will tolerate HEP for better progression toward LTGs and self-management of presentation. GOAL MET 6/16/2025  Improve patellar mobility and teach pt how to perform self patellar mobilizationGOAL MET 6/16/2025     Long Term Goals: 8 weeks in progress 6/25/2025  Patient will be independent with HEP for self-management of symptoms and current status.   Decrease girth by average 2 cm for improved mobility and safety with iADLs.  Patient will perform self lymphatic drainage techniques for long term management of lymphedema.   Patient to don/doff compression garments for self management of lymphedema.   Patient to obtain pneumatic compression pump for daily home use to assist in maintaining reduced swelling.   Patient to demonstrate LE AROM to WFL for improved ability to perform ADLs (self care, dressing, bathing) and iADLs (household chores, yard work) independently.   Pt to be able to get down to floor and up from floor with greater ease.      Plan   Await to assess garment fit at next appt.     Plan of Care Certification: 5/2/2025 to 7/2/2025 will need to extend POC to 8/2/2025      Outpatient Physical Therapy 2 time(s) a week for 6-8 weeks to include the following interventions: patient education, HEP, therapeutic exercises, neuromuscular re-education, therapeutic activity, manual therapy including pneumatic compression pump  and lymphatouch, self care/home management, modalities, gait training, decongestive massage, multi-layered bandaging, self massage, self bandaging, and assistance in obtaining appropriate compression garment.     Colette Alcocer, PT, CLT

## 2025-07-18 ENCOUNTER — PATIENT MESSAGE (OUTPATIENT)
Dept: REHABILITATION | Facility: HOSPITAL | Age: 35
End: 2025-07-18
Payer: COMMERCIAL

## 2025-07-30 ENCOUNTER — TELEPHONE (OUTPATIENT)
Dept: REHABILITATION | Facility: HOSPITAL | Age: 35
End: 2025-07-30
Payer: COMMERCIAL

## 2025-07-30 NOTE — TELEPHONE ENCOUNTER
Copied from CRM #[id number]. Topic: Appointments - Appointment Access  >> Jul 30, 2025 10:21 AM [place] wrote:    Name of Caller:Patient   When is the first available appointment?Dept Book Only  Would the patient rather a call back or a response via [email or web address]? Call back  Best Call Back Number:  Additional Information: Patient is requesting a call back to schedule PT appt..

## 2025-07-30 NOTE — TELEPHONE ENCOUNTER
Returned call to pt to schedule her for PT/Lymph R/A; pt accepted the apt on 8/13/25 at 2:00 pm. Location and check-in process known to pt. Pt thanked me for my assistance.    Copied from CRM #[id number]. Topic: Appointments - Appointment Access  >> Jul 30, 2025 10:21 AM [place] wrote:    Name of Caller:Patient   When is the first available appointment?Dept Book Only  Would the patient rather a call back or a response via [facility]? Call back  Best Call Back Number:  Additional Information: Patient is requesting a call back to schedule PT appt..

## 2025-08-28 ENCOUNTER — TELEPHONE (OUTPATIENT)
Dept: HEMATOLOGY/ONCOLOGY | Facility: CLINIC | Age: 35
End: 2025-08-28
Payer: COMMERCIAL

## (undated) DEVICE — TUBE SUCTION YANKAUER

## (undated) DEVICE — PADDING CAST SPECIALIST 6X4YD

## (undated) DEVICE — SUT MONOCRYL 3-0 PS-1

## (undated) DEVICE — SEE MEDLINE ITEM 152622

## (undated) DEVICE — CHLORAPREP W TINT 26ML APPL

## (undated) DEVICE — SEE MEDLINE ITEM 146298

## (undated) DEVICE — DRAPE STERI U-SHAPED 47X51IN

## (undated) DEVICE — TRAY MINOR ORTHO

## (undated) DEVICE — DRESSING AQUACEL AG ADV 3.5X6

## (undated) DEVICE — SUT VICRYL 3-0 27 SH

## (undated) DEVICE — SPONGE LAP 18X18 PREWASHED

## (undated) DEVICE — BANDAGE ACE ELASTIC 6"

## (undated) DEVICE — TOURNIQUET SB QC DP 34X4IN

## (undated) DEVICE — CONTAINER SPECIMEN STRL 4OZ

## (undated) DEVICE — SEE MEDLINE ITEM 157150

## (undated) DEVICE — STOCKINET TUBULAR 1 PLY 6X60IN

## (undated) DEVICE — SUT VICRYL BR 1 GEN 27 CT-1